# Patient Record
Sex: FEMALE | Race: WHITE | NOT HISPANIC OR LATINO | Employment: FULL TIME | URBAN - METROPOLITAN AREA
[De-identification: names, ages, dates, MRNs, and addresses within clinical notes are randomized per-mention and may not be internally consistent; named-entity substitution may affect disease eponyms.]

---

## 2017-02-11 ENCOUNTER — ALLSCRIPTS OFFICE VISIT (OUTPATIENT)
Dept: OTHER | Facility: OTHER | Age: 52
End: 2017-02-11

## 2017-02-11 DIAGNOSIS — E78.5 HYPERLIPIDEMIA: ICD-10-CM

## 2017-02-11 DIAGNOSIS — D64.9 ANEMIA: ICD-10-CM

## 2017-02-11 DIAGNOSIS — R53.83 OTHER FATIGUE: ICD-10-CM

## 2017-02-11 DIAGNOSIS — R10.9 ABDOMINAL PAIN: ICD-10-CM

## 2017-02-11 DIAGNOSIS — Z13.1 ENCOUNTER FOR SCREENING FOR DIABETES MELLITUS: ICD-10-CM

## 2017-02-11 DIAGNOSIS — E55.9 VITAMIN D DEFICIENCY: ICD-10-CM

## 2017-02-11 DIAGNOSIS — Z13.0 ENCOUNTER FOR SCREENING FOR DISEASES OF THE BLOOD AND BLOOD-FORMING ORGANS AND CERTAIN DISORDERS INVOLVING THE IMMUNE MECHANISM: ICD-10-CM

## 2017-02-11 DIAGNOSIS — Z13.29 ENCOUNTER FOR SCREENING FOR OTHER SUSPECTED ENDOCRINE DISORDER: ICD-10-CM

## 2017-02-14 ENCOUNTER — GENERIC CONVERSION - ENCOUNTER (OUTPATIENT)
Dept: OTHER | Facility: OTHER | Age: 52
End: 2017-02-14

## 2017-03-17 ENCOUNTER — ALLSCRIPTS OFFICE VISIT (OUTPATIENT)
Dept: OTHER | Facility: OTHER | Age: 52
End: 2017-03-17

## 2017-10-03 DIAGNOSIS — Z12.31 ENCOUNTER FOR SCREENING MAMMOGRAM FOR MALIGNANT NEOPLASM OF BREAST: ICD-10-CM

## 2017-10-10 ENCOUNTER — ALLSCRIPTS OFFICE VISIT (OUTPATIENT)
Dept: OTHER | Facility: OTHER | Age: 52
End: 2017-10-10

## 2017-10-12 NOTE — PROGRESS NOTES
Assessment    1  Headache (784 0) (R51)   2  Cervicalgia (723 1) (M54 2)    Plan  Cervicalgia    · Diclofenac Sodium 75 MG Oral Tablet Delayed Release; take 1 tablet by mouthtwice a day   · MethylPREDNISolone 4 MG Oral Tablet Therapy Pack (Medrol); TAKE ASDIRECTED ON PACKAGE  Cervicalgia, Headache    · Cyclobenzaprine HCl - 10 MG Oral Tablet; TAKE 1 TABLET 3 TIMES DAILY ASNEEDED  Headache    · * MRI BRAIN WO CONTRAST; Status:Need Information - Financial Authorization; Requested for:10Oct2017;     Discussion/Summary    Will get MRI brain as this is new headache symptoms and family hx of vascular problems  Will also try to treat in case of musculoskeletal origin  RTO 1 month  Chief Complaint  Pt presents today for worsening headaches  Pt states they are more often  History of Present Illness  HPI: Pt presents complaining of what she calls headaches, however, upon further questioning her pain is localized at the base of her head near the occiput and neck and radiates toward her shoulders  She notes that they are worse on the weekend  She has been having them for months  She describes it as a dull ache and notes it is worse with flexion and extension of the neck  Topamax has not helped  She denies ringing in the ears, hearing loss, joitn pain, fever or chills  Her sister was recently dx with vascular blockages in her brain  Pt notes some tingling in her hands and worsening of the pain when she bends over and stands back up  Review of Systems   Constitutional: as noted in HPI   ENT: no ear ache, no loss of hearing, no nosebleeds or nasal discharge, no sore throat or hoarseness  Cardiovascular: no complaints of slow or fast heart rate, no chest pain, no palpitations, no leg claudication or lower extremity edema  Respiratory: no complaints of shortness of breath, no wheezing, no dyspnea on exertion, no orthopnea or PND  Breasts: no complaints of breast pain, breast lump or nipple discharge  Gastrointestinal: no complaints of abdominal pain, no constipation, no nausea or diarrhea, no vomiting, no bloody stools  Genitourinary: no complaints of dysuria, no incontinence, no pelvic pain, no dysmenorrhea, no vaginal discharge or abnormal vaginal bleeding  Musculoskeletal: no complaints of arthralgia, no myalgia, no joint swelling or stiffness, no limb pain or swelling  Neurological: as noted in HPI  ROS reviewed  Active Problems  1  Abdominal pain (789 00) (R10 9)   2  Acute sinusitis (461 9) (J01 90)   3  Anemia (285 9) (D64 9)   4  Depression (311) (F32 9)   5  Encounter for screening mammogram for breast cancer (V76 12) (Z12 31)   6  Encounter for vitamin deficiency screening (V77 99) (Z13 21)   7  Fatigue (780 79) (R53 83)   8  Headache (784 0) (R51)   9  History of cervical cancer (V10 41) (Z85 41)   10  Hyperlipidemia (272 4) (E78 5)   11  Migraine headache (346 90) (G43 909)   12  Physical examination of employee (V70 5) (Z02 89)   13  Physical examination of employee (V70 5) (Z02 89)   14  Screen for colon cancer (V76 51) (Z12 11)   15  Screening for diabetes mellitus (DM) (V77 1) (Z13 1)   16  Screening for iron deficiency anemia (V78 0) (Z13 0)   17  Screening for thyroid disorder (V77 0) (Z13 29)   18  Vitamin D deficiency (268 9) (E55 9)    Past Medical History  Active Problems And Past Medical History Reviewed: The active problems and past medical history were reviewed and updated today  Surgical History  Surgical History Reviewed: The surgical history was reviewed and updated today  Social History     · Being A Social Drinker   · Marital History - Currently    · Never A Smoker  The social history was reviewed and updated today  The social history was reviewed and is unchanged  Family History  Family History Reviewed: The family history was reviewed and updated today  Current Meds   1   Flonase Allergy Relief 50 MCG/ACT Nasal Suspension; use 2 sprays in each nostril once daily; Therapy: 20XFF8838 to (Last Rx:21Dox4324)  Requested for: 65PVW6456 Ordered   2  Montelukast Sodium 10 MG Oral Tablet; TAKE 1 TABLET DAILY; Therapy: 27FEV1434 to (Evaluate:70Kir7267)  Requested for: 95AMD1932; Last Rx:83Gdv2362 Ordered   3  Rosuvastatin Calcium 10 MG Oral Tablet; Take 1 tablet daily; Therapy: 43LTX7566 to (Last Rx:27Yld1048)  Requested for: 09Vyp1288 Ordered   4  SUMAtriptan Succinate 100 MG Oral Tablet; TAKE 1 TABLET AT ONSET OF MIAGRAINE HEADACHE  MAY REPEAT IN 2 HOURS; Therapy: 17POY2032 to (NBYBFFMZ:97PGS1811)  Requested for: 25TBZ1142; Last Rx:37Tme9033 Ordered   5  Topiramate 50 MG Oral Tablet; take 1 tablet twice a day; Therapy: 44GYD3967 to (Last Rx:12Qdc9627)  Requested for: 41FDF7194 Ordered   6  Venlafaxine HCl ER 75 MG Oral Capsule Extended Release 24 Hour; Take 2 capsules daily; Therapy: 89RSP4631 to (Last Rx:03Mas7000)  Requested for: 13Xig2609 Ordered    The medication list was reviewed and updated today  Allergies  1  No Known Drug Allergies    Vitals   Recorded: 58UCT5640 10:29AM   Temperature 99 5 F   Heart Rate 80   Respiration 16   Systolic 230   Diastolic 80   Height 5 ft 11 in   Weight 157 lb 2 oz   BMI Calculated 21 91   BSA Calculated 1 9       Physical Exam   Constitutional  General appearance: No acute distress, well appearing and well nourished  Ears, Nose, Mouth, and Throat  External inspection of ears and nose: Normal    Otoscopic examination: Tympanic membranes translucent with normal light reflex  Canals patent without erythema  Nasal mucosa, septum, and turbinates: Normal without edema or erythema  Oropharynx: Normal with no erythema, edema, exudate or lesions  Pulmonary  Respiratory effort: No increased work of breathing or signs of respiratory distress  Auscultation of lungs: Clear to auscultation  Cardiovascular  Auscultation of heart: Normal rate and rhythm, normal S1 and S2, without murmurs     Examination of extremities for edema and/or varicosities: Normal    Carotid pulses: Normal    Abdomen  Abdomen: Non-tender, no masses     Psychiatric  Orientation to person, place, and time: Normal    Mood and affect: Normal          Signatures   Electronically signed by : MADISON Copeland; Oct 11 2017  8:33AM EST                       (Author)    Electronically signed by : CARLY West ; Oct 11 2017  8:43AM EST                       (Author)

## 2017-10-18 ENCOUNTER — GENERIC CONVERSION - ENCOUNTER (OUTPATIENT)
Dept: OTHER | Facility: OTHER | Age: 52
End: 2017-10-18

## 2017-10-24 ENCOUNTER — GENERIC CONVERSION - ENCOUNTER (OUTPATIENT)
Dept: OTHER | Facility: OTHER | Age: 52
End: 2017-10-24

## 2017-12-07 ENCOUNTER — TELEPHONE (OUTPATIENT)
Dept: LABOR AND DELIVERY | Facility: HOSPITAL | Age: 52
End: 2017-12-07

## 2018-01-12 VITALS
WEIGHT: 157.13 LBS | SYSTOLIC BLOOD PRESSURE: 120 MMHG | DIASTOLIC BLOOD PRESSURE: 80 MMHG | HEART RATE: 80 BPM | BODY MASS INDEX: 22 KG/M2 | HEIGHT: 71 IN | RESPIRATION RATE: 16 BRPM | TEMPERATURE: 99.5 F

## 2018-01-12 NOTE — PROGRESS NOTES
Assessment   1  Abdominal pain (789 00) (R10 9)  2  Encounter for preventive health examination (V70 0) (Z00 00)  3  History of cervical cancer (V10 41) (Z85 41)    Plan   Abdominal pain    · * XR ABDOMEN OBSTRUCTION SERIES; Status:Active; Requested for:11Feb2017; Anemia, Fatigue, Hyperlipidemia, Screening for diabetes mellitus (DM), Screening for  iron deficiency anemia, Screening for thyroid disorder    · * MAMMO SCREENING BILATERAL W CAD; Status:Hold For - Scheduling; Requested  for:11Feb2017;   Hyperlipidemia    · Stop: Crestor 10 MG Oral Tablet (Rosuvastatin Calcium)    (1) CBC/PLT/DIFF; Status:Active - Retrospective By Protocol Authorization; Requested for:11Feb2017;   Due:11Feb2018; Last Updated David Tony; 2/13/2017 8:10:20 AM;Ordered; For:Anemia, Fatigue, Hyperlipidemia, Screening for diabetes mellitus (DM), Screening for iron deficiency anemia, Screening for thyroid disorder; Ordered By:Thaddeus Schaffer;   (1) COMPREHENSIVE METABOLIC PANEL; Status:Active - Retrospective By Protocol Authorization; Requested for:11Feb2017;   Due:11Feb2018; Last Updated David Tony; 2/13/2017 8:08:21 AM;Ordered; For:Anemia, Fatigue, Hyperlipidemia, Screening for diabetes mellitus (DM), Screening for iron deficiency anemia, Screening for thyroid disorder; Ordered By:Thaddeus Schaffer;   (1) LIPID PANEL, FASTING; Status:Active - Retrospective By Protocol Authorization; Requested for:11Feb2017;   Due:11Feb2018; Last Updated David Tony; 2/13/2017 8:08:22 AM;Ordered; For:Anemia, Fatigue, Hyperlipidemia, Screening for diabetes mellitus (DM), Screening for iron deficiency anemia, Screening for thyroid disorder; Ordered By:Thaddeus Schaffer;   (1) TSH; Status:Active - Retrospective By Protocol Authorization; Requested for:11Feb2017;   Due:11Feb2018; Last Updated David Tony; 2/13/2017 8:08:22 AM;Ordered;     For:Anemia, Fatigue, Hyperlipidemia, Screening for diabetes mellitus (DM), Screening for iron deficiency anemia, Screening for thyroid disorder; Ordered By:Thaddeus Schaffer;   (1) VITAMIN D 25-HYDROXY; Status:Active - Retrospective By Protocol Authorization; Requested for:54Lvt7952;   Due:06Xze3471; Last Updated June Doherty; 2/13/2017 8:08:31 AM;Ordered; For:Anemia, Fatigue, Hyperlipidemia, Screening for diabetes mellitus (DM), Screening for iron deficiency anemia, Screening for thyroid disorder; Ordered By:Trina Schaffer; Discussion/Summary  healthy adult female Currently, she eats a healthy diet and has an adequate exercise regimen  cervical cancer screening is current Breast cancer screening: mammogram is current  Colorectal cancer screening: colorectal cancer screening is current  Check obstruction series xray  EKG shows NSR, no electrical alternans and physical exam is normal  Form completed for insurance  RTO prn  Chief Complaint  Pt here for physical for insurance, due for labs, will obtain mammo from breast health services, no complaints  History of Present Illness  HM, Adult Female: The patient is being seen for a health maintenance evaluation  The last health maintenance visit was 1 year(s) ago  Social History: Household members include spouse  She is   Work status: working full time  The patient has never smoked cigarettes  She reports never drinking alcohol  She has never used illicit drugs  General Health: The patient's health since the last visit is described as good  She has regular dental visits  She denies vision problems  She denies hearing loss  Immunizations status: up to date  Lifestyle:  She consumes a diverse and healthy diet  She does not have any weight concerns  She exercises regularly  She does not use tobacco  She denies alcohol use  She denies drug use  Reproductive health: the patient is premenopausal    Screening: cancer screening reviewed and updated  metabolic screening reviewed and updated     risk screening reviewed and updated  HPI: Pt needs form completed for life insurance documenting her hx of cervical cancer in 2009  She has not had any recurrence  She had radical hysterectomy  Pt also had CT chest abdomen and pelvis in the summer and showed small pericardial effusion  Pt is asymptomatic  Will get EKG  She also had small bowel obstruction over the summer and continues with some intermittent abdominal pain which concerns her  Review of Systems    Constitutional: as noted in HPI  Eyes: No complaints of eye pain, no red eyes, no eyesight problems, no discharge, no dry eyes, no itching of eyes  ENT: no complaints of earache, no loss of hearing, no nose bleeds, no nasal discharge, no sore throat, no hoarseness  ROS reviewed  Active Problems   1  Acute sinusitis (461 9) (J01 90)  2  Anemia (285 9) (D64 9)  3  Depression (311) (F32 9)  4  Encounter for vitamin deficiency screening (V77 99) (Z13 21)  5  Fatigue (780 79) (R53 83)  6  Headache (784 0) (R51)  7  Hyperlipidemia (272 4) (E78 5)  8  Migraine headache (346 90) (G43 909)  9  Physical examination of employee (V70 5) (Z02 89)  10  Screen for colon cancer (V76 51) (Z12 11)  11  Screening for diabetes mellitus (DM) (V77 1) (Z13 1)  12  Screening for iron deficiency anemia (V78 0) (Z13 0)  13  Screening for thyroid disorder (V77 0) (Z13 29)    Past Medical History    · History of malignant neoplasm of cervix (V10 41) (Z85 41)   · History of Physical examination of employee (V70 5) (Z02 89)    Surgical History    · History of Hysterectomy    Social History    · Being A Social Drinker   · Marital History - Currently    · Never A Smoker    Current Meds  1  Crestor 10 MG Oral Tablet; Take 1 tablet daily; Therapy: 86CJS9541 to (Last 252 2288)  Requested for: 05KWO3558 Ordered  2  Flonase Allergy Relief 50 MCG/ACT Nasal Suspension; use 2 sprays in each nostril once   daily; Therapy: 91PBO9088 to (Last Rx:81Zld0445)  Requested for: 16EWL2977 Ordered  3  LORazepam 1 MG Oral Tablet; TAKE 1 TABLET 3 TIMES DAILY AS NEEDED; Therapy: 95Rap7400 to  Requested for: 28UFP1582 Recorded  4  Rosuvastatin Calcium 10 MG Oral Tablet; Take 1 tablet daily; Therapy: 20JCL6071 to (Last KZ:15XXE0101)  Requested for: 52LUR0319 Ordered  5  Topiramate 50 MG Oral Tablet; take 1 tablet twice a day; Therapy: 87WWD0126 to (Last QZ:74UXW3618)  Requested for: 21Jan2017 Ordered  6  Treximet  MG Oral Tablet; TAKE 1 TABLET AT ONSET OF HEADACHE  MAY   REPEAT ONCE IN 2 HOURS AS NEEDED  MAXIMUM  2 TABLETS IN 24 HOURS; Therapy: 40YXN1979 to (Kenisha Montoya)  Requested for: 06JOM8633; Last   Rx:03Nov2014 Ordered  7  Venlafaxine HCl ER 75 MG Oral Capsule Extended Release 24 Hour; Take 2 capsules   daily; Therapy: 91XSE5963 to (Last Rx:11Atc2475)  Requested for: 25Oct2016 Ordered    Allergies   1  No Known Drug Allergies    Vitals   Recorded: 57DNB8180 09:49AM   Temperature 98 3 F   Heart Rate 86   Respiration 16   Systolic 429   Diastolic 76   Height 5 ft 11 in   Weight 147 lb    BMI Calculated 20 5   BSA Calculated 1 85     Physical Exam    Constitutional   General appearance: No acute distress, well appearing and well nourished  Head and Face   Head and face: Normal     Palpation of the face and sinuses: No sinus tenderness  Eyes   Conjunctiva and lids: No swelling, erythema or discharge  Pupils and irises: Equal, round, reactive to light  Ophthalmoscopic examination: Normal fundi and optic discs  Ears, Nose, Mouth, and Throat   External inspection of ears and nose: Normal     Otoscopic examination: Tympanic membranes translucent with normal light reflex  Canals patent without erythema  Hearing: Normal     Nasal mucosa, septum, and turbinates: Normal without edema or erythema  Lips, teeth, and gums: Normal, good dentition  Oropharynx: Normal with no erythema, edema, exudate or lesions  Neck   Neck: Supple, symmetric, trachea midline, no masses  Thyroid: Normal, no thyromegaly  Pulmonary   Respiratory effort: No increased work of breathing or signs of respiratory distress  Auscultation of lungs: Clear to auscultation  Cardiovascular   Auscultation of heart: Normal rate and rhythm, normal S1 and S2, no murmurs  Carotid pulses: 2+ bilaterally  Examination of extremities for edema and/or varicosities: Normal     Musculoskeletal   Gait and station: Normal     Digits and nails: Normal without clubbing or cyanosis  Joints, bones, and muscles: Normal     Range of motion: Normal     Stability: Normal     Muscle strength/tone: Normal     Psychiatric   Judgment and insight: Normal     Orientation to person, place, and time: Normal     Recent and remote memory: Intact      Mood and affect: Normal        Future Appointments    Date/Time Provider Specialty Site   03/16/2017 01:45 PM Roshan Shipman [facility]     Signatures   Electronically signed by : Ric Foster [facility]; Feb 13 2017  8:22AM EST                       (Author)    Electronically signed by : CARLY Puentes ; Feb 13 2017  9:06AM EST                       (Author)

## 2018-01-13 VITALS
HEIGHT: 71 IN | BODY MASS INDEX: 20.58 KG/M2 | DIASTOLIC BLOOD PRESSURE: 76 MMHG | HEART RATE: 86 BPM | WEIGHT: 147 LBS | RESPIRATION RATE: 16 BRPM | SYSTOLIC BLOOD PRESSURE: 116 MMHG | TEMPERATURE: 98.3 F

## 2018-01-13 NOTE — PROGRESS NOTES
Assessment    1  Encounter for preventive health examination (V70 0) (Z00 00)    Plan  Physical examination of employee    · [employer] [facility]- [address]; Status:Complete - Retrospective By Protocol Authorization;    Done: 85PBI0903 12:00AM  Unlinked    · LORazepam 1 MG Oral Tablet    Discussion/Summary  health maintenance visit     Cleared to drive for the [facility]  Chief Complaint  Pt here for [facility] Physical Exam  OS 20/20, OD 20/20, corrected  Pt able to distinguish colors on Snellen eye chart  History of Present Illness  HM, Adult Female: The patient is being seen for a health maintenance evaluation  The last health maintenance visit was 12 month(s) ago  General Health: The patient's health since the last visit is described as good  Screening:   HPI: The patient is here for [facility] physical      Review of Systems    Constitutional: No fever, no chills, feels well, no tiredness, no recent weight gain or weight loss  Eyes: No complaints of eye pain, no red eyes, no eyesight problems, no discharge, no dry eyes, no itching of eyes  ENT: no complaints of earache, no loss of hearing, no nose bleeds, no nasal discharge, no sore throat, no hoarseness  Cardiovascular: No complaints of slow heart rate, no fast heart rate, no chest pain, no palpitations, no leg claudication, no lower extremity edema  Respiratory: No complaints of shortness of breath, no wheezing, no cough, no SOB on exertion, no orthopnea, no PND  Gastrointestinal: No complaints of abdominal pain, no constipation, no nausea or vomiting, no diarrhea, no bloody stools  Genitourinary: No complaints of dysuria, no incontinence, no pelvic pain, no dysmenorrhea, no vaginal discharge or bleeding  Musculoskeletal: No complaints of arthralgias, no myalgias, no joint swelling or stiffness, no limb pain or swelling  Integumentary: No complaints of skin rash or lesions, no itching, no skin wounds, no breast pain or lump     Neurological: No complaints of headache, no confusion, no convulsions, no numbness, no dizziness or fainting, no tingling, no limb weakness, no difficulty walking  Psychiatric: Not suicidal, no sleep disturbance, no anxiety or depression, no change in personality, no emotional problems  Endocrine: No complaints of proptosis, no hot flashes, no muscle weakness, no deepening of the voice, no feelings of weakness  Hematologic/Lymphatic: No complaints of swollen glands, no swollen glands in the neck, does not bleed easily, does not bruise easily  Active Problems    1  Abdominal pain (789 00) (R10 9)   2  Acute sinusitis (461 9) (J01 90)   3  Anemia (285 9) (D64 9)   4  Depression (311) (F32 9)   5  Encounter for vitamin deficiency screening (V77 99) (Z13 21)   6  Fatigue (780 79) (R53 83)   7  Headache (784 0) (R51)   8  History of cervical cancer (V10 41) (Z85 41)   9  Hyperlipidemia (272 4) (E78 5)   10  Migraine headache (346 90) (G43 909)   11  Physical examination of employee (V70 5) (Z02 89)   12  Screen for colon cancer (V76 51) (Z12 11)   13  Screening for diabetes mellitus (DM) (V77 1) (Z13 1)   14  Screening for iron deficiency anemia (V78 0) (Z13 0)   15  Screening for thyroid disorder (V77 0) (Z13 29)   16  Vitamin D deficiency (268 9) (E55 9)    Past Medical History    · History of intestinal obstruction (V12 79) (Z87 19)   · History of malignant neoplasm of cervix (V10 41) (Z85 41)    Surgical History    · History of Hysterectomy   · History of Small Bowel Resection    Social History    · Being A Social Drinker   · Marital History - Currently    · Never A Smoker    Current Meds   1  Flonase Allergy Relief 50 MCG/ACT Nasal Suspension; use 2 sprays in each nostril once   daily; Therapy: 69RXT7310 to (Last Rx:84Qsp4594)  Requested for: 34ZRE7860 Ordered   2  LORazepam 1 MG Oral Tablet; TAKE 1 TABLET 3 TIMES DAILY AS NEEDED; Therapy: 95Ujh2926 to  Requested for: 60RZR7267 Recorded   3   Montelukast Sodium 10 MG Oral Tablet; TAKE 1 TABLET DAILY; Therapy: 37NUH6051 to (Evaluate:22Yev2288)  Requested for: 23REX1805; Last   Rx:26Oot5059 Ordered   4  Rosuvastatin Calcium 10 MG Oral Tablet; Take 1 tablet daily; Therapy: 48BMT7329 to (Last TN:11TQG4674)  Requested for: 18DDZ3112 Ordered   5  Topiramate 50 MG Oral Tablet; take 1 tablet twice a day; Therapy: 31QFH7168 to (Last I45UKJ9348)  Requested for: 2017 Ordered   6  Treximet  MG Oral Tablet; TAKE 1 TABLET AT ONSET OF HEADACHE  MAY   REPEAT ONCE IN 2 HOURS AS NEEDED  MAXIMUM  2 TABLETS IN 24 HOURS; Therapy: 19OHN9483 to (Shayne Sunshine)  Requested for: 77SCR9866; Last   Rx:36Iep4881 Ordered   7  Venlafaxine HCl ER 75 MG Oral Capsule Extended Release 24 Hour; Take 2 capsules   daily; Therapy: 06BOI3573 to (Last Rx:69Dei8642)  Requested for: 2016 Ordered    Allergies    1  No Known Drug Allergies    Vitals   Recorded: 11LFF6861 10:51AM   Temperature 98 7 F, Tympanic   Heart Rate 76   Respiration 16   Systolic 92, LUE, Sitting   Diastolic 62, LUE, Sitting   Height 5 ft 11 in   Weight 157 lb 2 08 oz   BMI Calculated 21 92   BSA Calculated 1 9     Physical Exam    Constitutional   General appearance: No acute distress, well appearing and well nourished  Eyes   Conjunctiva and lids: No swelling, erythema or discharge  Ears, Nose, Mouth, and Throat   Otoscopic examination: Tympanic membranes translucent with normal light reflex  Canals patent without erythema  Hearing: Normal     Nasal mucosa, septum, and turbinates: Normal without edema or erythema  Lips, teeth, and gums: Normal, good dentition  Oropharynx: Normal with no erythema, edema, exudate or lesions  Neck   Neck: Supple, symmetric, trachea midline, no masses  Pulmonary   Respiratory effort: No increased work of breathing or signs of respiratory distress  Auscultation of lungs: Clear to auscultation      Cardiovascular   Auscultation of heart: Normal rate and rhythm, normal S1 and S2, no murmurs  Carotid pulses: 2+ bilaterally  Examination of extremities for edema and/or varicosities: Normal     Chest   Chest: Normal     Abdomen   Abdomen: Non-tender, no masses  Liver and spleen: No hepatomegaly or splenomegaly  Examination for hernias: No hernia appreciated  Lymphatic   Palpation of lymph nodes in neck: No lymphadenopathy  Palpation of lymph nodes in axillae: No lymphadenopathy  Palpation of lymph nodes in groin: No lymphadenopathy  Palpation of lymph nodes in other areas: No lymphadenopathy  Musculoskeletal   Gait and station: Normal     Digits and nails: Normal without clubbing or cyanosis  Joints, bones, and muscles: Normal     Range of motion: Normal     Stability: Normal     Muscle strength/tone: Normal     Skin   Skin and subcutaneous tissue: Normal without rashes or lesions  Palpation of skin and subcutaneous tissue: Normal turgor  Neurologic   Cranial nerves: Cranial nerves II-XII intact  Reflexes: 2+ and symmetric  Sensation: No sensory loss      Psychiatric   Judgment and insight: Normal     Mood and affect: Normal        Results/Data  [facility]- POC 06SRG0669 12:00AM Beatriz Counts     Test Name Result Flag Reference   Right Eye 20/20     Left Eye 20/20         Signatures   Electronically signed by : Cam Newell DO; Mar 17 2017 11:05AM EST                       (Author)

## 2018-01-15 NOTE — PROGRESS NOTES
Assessment    1  Physical examination of employee (V70 5) (Z02 89)    Plan  Acute sinusitis    · Amoxicillin-Pot Clavulanate 875-125 MG Oral Tablet   · Promethazine-Codeine 6 25-10 MG/5ML Oral Syrup  Anemia, Encounter for vitamin deficiency screening, Fatigue, Hyperlipidemia, Screening  for diabetes mellitus (DM), Screening for iron deficiency anemia, Screening  for thyroid disorder    · (1) CBC/PLT/DIFF; Status:Resulted - Requires Verification;   Done: 18LZC6294 10:33AM   · (1) COMPREHENSIVE METABOLIC PANEL; Status:Resulted - Requires Verification;    Done: 39EIX2393 10:33AM   · (1) LIPID PANEL, FASTING; Status:Resulted - Requires Verification;   Done: 02CFM7449  10:33AM   · (1) TSH; Status:Resulted - Requires Verification;   Done: 98ZQQ9341 10:33AM   · (1) VITAMIN D 25-HYDROXY; Status:Resulted - Requires Verification;   Done:  11XGA6846 10:33AM  Screen for colon cancer    · COLONOSCOPY; Status:Active; Requested for:43Kds4413; Unlinked    · Delsym 30 MG/5ML Oral Liquid Extended Release    Discussion/Summary  health maintenance visit Currently, she eats a healthy diet and has an adequate exercise regimen  cervical cancer screening is current Breast cancer screening: mammogram is current  Colorectal cancer screening: colonoscopy has been ordered  CLIU danish completed  Pt cleared to drive  RTO as routinely scheduled  All health maintenance up to date  The patient was counseled regarding instructions for management, risk factor reductions, patient and family education, risks and benefits of treatment options, importance of compliance with treatment  Chief Complaint  Pt here for cliu physical  vision 20 20      History of Present Illness  , Adult Female: The patient is being seen for a health maintenance evaluation  The last health maintenance visit was 1 year(s) ago  Social History: Household members include spouse  She is   Work status: working full time   The patient has never smoked cigarettes  General Health: The patient's health since the last visit is described as good  She has regular dental visits  She denies vision problems  She denies hearing loss  Immunizations status: up to date  Lifestyle:  She consumes a diverse and healthy diet  She does not have any weight concerns  She exercises regularly  She does not use tobacco  She denies alcohol use  She denies drug use  Screening: cancer screening reviewed and updated  metabolic screening reviewed and updated  risk screening reviewed and updated  Review of Systems    Constitutional: as noted in HPI  Eyes: No complaints of eye pain, no red eyes, no eyesight problems, no discharge, no dry eyes, no itching of eyes  ENT: no complaints of earache, no loss of hearing, no nose bleeds, no nasal discharge, no sore throat, no hoarseness  Cardiovascular: No complaints of slow heart rate, no fast heart rate, no chest pain, no palpitations, no leg claudication, no lower extremity edema  Respiratory: No complaints of shortness of breath, no wheezing, no cough, no SOB on exertion, no orthopnea, no PND  Gastrointestinal: No complaints of abdominal pain, no constipation, no nausea or vomiting, no diarrhea, no bloody stools  Genitourinary: No complaints of dysuria, no incontinence, no pelvic pain, no dysmenorrhea, no vaginal discharge or bleeding  Musculoskeletal: No complaints of arthralgias, no myalgias, no joint swelling or stiffness, no limb pain or swelling  Integumentary: No complaints of skin rash or lesions, no itching, no skin wounds, no breast pain or lump  Neurological: No complaints of headache, no confusion, no convulsions, no numbness, no dizziness or fainting, no tingling, no limb weakness, no difficulty walking  Psychiatric: Not suicidal, no sleep disturbance, no anxiety or depression, no change in personality, no emotional problems     Endocrine: No complaints of proptosis, no hot flashes, no muscle weakness, no deepening of the voice, no feelings of weakness  Hematologic/Lymphatic: No complaints of swollen glands, no swollen glands in the neck, does not bleed easily, does not bruise easily  ROS reviewed  Active Problems    1  Acute sinusitis (461 9) (J01 90)   2  Anemia (285 9) (D64 9)   3  Depression (311) (F32 9)   4  Fatigue (780 79) (R53 83)   5  Headache (784 0) (R51)   6  Hyperlipidemia (272 4) (E78 5)   7  Migraine headache (346 90) (G43 909)    Past Medical History    · History of Physical examination of employee (V70 5) (Z02 89)    Social History    · Being A Social Drinker   · Marital History - Currently    · Never A Smoker    Current Meds   1  Amoxicillin-Pot Clavulanate 875-125 MG Oral Tablet; TAKE 1 TABLET EVERY 12   HOURS DAILY; Therapy: 72SQU7810 to (Evaluate:23Feb2016)  Requested for: 22XOD7509; Last   Rx:13Feb2016 Ordered   2  Crestor 10 MG Oral Tablet; Take 1 tablet daily; Therapy: 22CMG9306 to (Last Rx:09Nov2015)  Requested for: 16XDM4588 Ordered   3  Delsym 30 MG/5ML Oral Liquid Extended Release; Therapy: (Recorded:11Taz5262) to Recorded   4  Flonase Allergy Relief 50 MCG/ACT Nasal Suspension; use 2 sprays in each nostril once   daily; Therapy: 78SQU5180 to (Last Rx:13Feb2016)  Requested for: 17WWF3598 Ordered   5  LORazepam 1 MG Oral Tablet; Therapy: 32AJZ6939 to (Last Rx:02Feb2012)  Requested for: 37Uny0385 Ordered   6  Promethazine-Codeine 6 25-10 MG/5ML Oral Syrup; TAKE 5 ML EVERY 4 TO 6 HOURS   AS NEEDED FOR COUGH; Therapy: 64LUS1921 to (Evaluate:16Mar2016); Last Rx:31Lec6573 Ordered   7  Topiramate 50 MG Oral Tablet; take 1 tablet twice a day; Therapy: 84CWJ9796 to (Last Rx:09Nov2015)  Requested for: 19HUT7496 Ordered   8  Treximet  MG Oral Tablet; TAKE 1 TABLET AT ONSET OF HEADACHE  MAY   REPEAT ONCE IN 2 HOURS AS NEEDED  MAXIMUM  2 TABLETS IN 24 HOURS; Therapy: 71CFU0196 to (Hanks Patches)  Requested for: 90HXQ9389; Last   Rx:03Nov2014 Ordered   9  Venlafaxine HCl ER 75 MG Oral Capsule Extended Release 24 Hour; Take 2 capsules   daily; Therapy: 44MOL6891 to (Last Rx:12Jan2016)  Requested for: 12Jan2016 Ordered    Allergies    1  No Known Drug Allergies    Vitals   Recorded: 73Uvo9255 09:59AM   Temperature 98 6 F   Heart Rate 83   Respiration 14   Systolic 615   Diastolic 70   Height 5 ft 11 in   Weight 149 lb    BMI Calculated 20 78   BSA Calculated 1 86     Physical Exam    Constitutional   General appearance: No acute distress, well appearing and well nourished  Head and Face   Head and face: Normal     Palpation of the face and sinuses: No sinus tenderness  Eyes   Conjunctiva and lids: No swelling, erythema or discharge  Pupils and irises: Equal, round, reactive to light  Ophthalmoscopic examination: Normal fundi and optic discs  Ears, Nose, Mouth, and Throat   External inspection of ears and nose: Normal     Otoscopic examination: Tympanic membranes translucent with normal light reflex  Canals patent without erythema  Hearing: Normal     Nasal mucosa, septum, and turbinates: Normal without edema or erythema  Lips, teeth, and gums: Normal, good dentition  Oropharynx: Normal with no erythema, edema, exudate or lesions  Neck   Neck: Supple, symmetric, trachea midline, no masses  Thyroid: Normal, no thyromegaly  Pulmonary   Respiratory effort: No increased work of breathing or signs of respiratory distress  Percussion of chest: Normal     Palpation of chest: Normal     Auscultation of lungs: Clear to auscultation  Cardiovascular   Auscultation of heart: Normal rate and rhythm, normal S1 and S2, no murmurs  Carotid pulses: 2+ bilaterally  Abdominal aorta: Normal     Pedal pulses: 2+ bilaterally  Peripheral vascular exam: Normal     Examination of extremities for edema and/or varicosities: Normal     Abdomen   Abdomen: Non-tender, no masses      Musculoskeletal   Gait and station: Normal     Digits and nails: Normal without clubbing or cyanosis  Joints, bones, and muscles: Normal     Range of motion: Normal     Stability: Normal     Muscle strength/tone: Normal     Skin   Skin and subcutaneous tissue: Normal without rashes or lesions  Palpation of skin and subcutaneous tissue: Normal turgor  Psychiatric   Judgment and insight: Normal     Orientation to person, place, and time: Normal     Recent and remote memory: Intact  Mood and affect: Normal        Results/Data  (1) CBC/PLT/DIFF 70EVN7322 10:33AM Real Vigil Order Number: [id number]     Order Number: [id number]     Test Name Result Flag Reference   WBC COUNT 3 62 Thousand/uL L 4 31-10 16   RBC COUNT 4 49 Million/uL  3 81-5 12   HEMOGLOBIN 13 7 g/dL  11 5-15 4   HEMATOCRIT 40 7 %  34 8-46  1   MCV 91 fL  82-98   MCH 30 5 pg  26 8-34 3   MCHC 33 7 g/dL  31 4-37 4   RDW 12 4 %  11 6-15 1   MPV 9 9 fL  8 9-12 7   PLATELET COUNT 121 Thousands/uL  149-390   NEUTROPHILS RELATIVE PERCENT 56 %  43-75   LYMPHOCYTES RELATIVE PERCENT 35 %  14-44   MONOCYTES RELATIVE PERCENT 8 %  4-12   EOSINOPHILS RELATIVE PERCENT 1 %  0-6   BASOPHILS RELATIVE PERCENT 0 %  0-1   NEUTROPHILS ABSOLUTE COUNT 2 03 Thousands/µL  1 85-7 62   LYMPHOCYTES ABSOLUTE COUNT 1 26 Thousands/µL  0 60-4 47   MONOCYTES ABSOLUTE COUNT 0 30 Thousand/µL  0 17-1 22   EOSINOPHILS ABSOLUTE COUNT 0 03 Thousand/µL  0 00-0 61   BASOPHILS ABSOLUTE COUNT 0 00 Thousands/µL  0 00-0 10     (1) COMPREHENSIVE METABOLIC PANEL 92VKE7387 04:04LZ Real ASP64maria luisa Order Number: [id number]      National Kidney Disease Education Program recommendations are as follows:  GFR calculation is accurate only with a steady state creatinine  Chronic Kidney disease less than 60 ml/min/1 73 sq  meters  Kidney failure less than 15 ml/min/1 73 sq  meters       Test Name Result Flag Reference   GLUCOSE,RANDM 95 mg/dL     If the patient is fasting, the ADA then defines impaired fasting glucose as > 100 mg/dL and diabetes as > or equal to 123 mg/dL  SODIUM 140 mmol/L  136-145   POTASSIUM 4 9 mmol/L  3 5-5 3   CHLORIDE 103 mmol/L  100-108   CARBON DIOXIDE 25 mmol/L  21-32   ANION GAP (CALC) 12 mmol/L  4-13   BLOOD UREA NITROGEN 12 mg/dL  5-25   CREATININE 0 66 mg/dL  0 60-1 30   Standardized to IDMS reference method   CALCIUM 9 2 mg/dL  8 3-10 1   BILI, TOTAL 0 28 mg/dL  0 20-1 00   ALK PHOSPHATAS 92 U/L     ALT (SGPT) 36 U/L  12-78   AST(SGOT) 23 U/L  5-45   ALBUMIN 4 1 g/dL  3 5-5 0   TOTAL PROTEIN 7 3 g/dL  6 4-8 2   eGFR Non-African American      >60 0 ml/min/1 73sq m     (1) LIPID PANEL, FASTING 93Lrv5766 10:33AM Lynn Bella Order Number: [id number]      Triglyceride:         Normal              <150 mg/dl       Borderline High    150-199 mg/dl       High               200-499 mg/dl       Very High          >499 mg/dl  Cholesterol:         Desirable        <200 mg/dl      Borderline High  200-239 mg/dl      High             >239 mg/dl  HDL Cholesterol:        High    >59 mg/dL      Low     <41 mg/dL  LDL CALCULATED:    This screening LDL is a calculated result  It does not have the accuracy of the Direct Measured LDL in the monitoring of patients with hyperlipidemia and/or statin therapy  Direct Measure LDL (DDM775) must be ordered separately in these patients  Test Name Result Flag Reference   CHOLESTEROL 158 mg/dL     HDL,DIRECT 70 mg/dL H 40-60   LDL CHOLESTEROL CALCULATED 80 mg/dL  0-100   TRIGLYCERIDES 38 mg/dL  <=150     (1) TSH 32YWY5133 10:33AM Dejon Flood Order Number: [id number]    Patients undergoing fluorescein dye angiography may retain small amounts of fluorescein in the body for 48-72 hours post procedure  Samples containing fluorescein can produce falsely depressed TSH values  If the patient had this procedure,a specimen should be resubmitted post fluorescein clearance          The recommended reference ranges for TSH during pregnancy are as follows:  First trimester 0 1 to 2 5 uIU/mL  Second trimester  0 2 to 3 0 uIU/mL  Third trimester 0 3 to 3 0 uIU/m     Test Name Result Flag Reference   TSH 1 129 uIU/mL  0 358-3 740     (1) VITAMIN D 25-HYDROXY 65Nuk0568 10:33AM Tammie Solorio Order Number: [id number]    TW Order Number: [id number]     Test Name Result Flag Reference   VIT D 25-HYDROX 22 7 ng/mL L 30 0-100 0       Signatures   Electronically signed by : MADISON Ramirez; Feb 25 2016  4:38PM EST                       (Author)    Electronically signed by : Lillian Graham DO; Feb 26 2016  8:04AM EST                       (Author)

## 2018-01-18 NOTE — RESULT NOTES
Verified Results  (1) CBC/PLT/DIFF [id number] 10:33AM Adalberto Watkins Order Number: [id number]     Order Number: [id number]     Test Name Result Flag Reference   WBC COUNT 3 62 Thousand/uL L 4 31-10 16   RBC COUNT 4 49 Million/uL  3 81-5 12   HEMOGLOBIN 13 7 g/dL  11 5-15 4   HEMATOCRIT 40 7 %  34 8-46  1   MCV 91 fL  82-98   MCH 30 5 pg  26 8-34 3   MCHC 33 7 g/dL  31 4-37 4   RDW 12 4 %  11 6-15 1   MPV 9 9 fL  8 9-12 7   PLATELET COUNT 412 Thousands/uL  149-390   NEUTROPHILS RELATIVE PERCENT 56 %  43-75   LYMPHOCYTES RELATIVE PERCENT 35 %  14-44   MONOCYTES RELATIVE PERCENT 8 %  4-12   EOSINOPHILS RELATIVE PERCENT 1 %  0-6   BASOPHILS RELATIVE PERCENT 0 %  0-1   NEUTROPHILS ABSOLUTE COUNT 2 03 Thousands/µL  1 85-7 62   LYMPHOCYTES ABSOLUTE COUNT 1 26 Thousands/µL  0 60-4 47   MONOCYTES ABSOLUTE COUNT 0 30 Thousand/µL  0 17-1 22   EOSINOPHILS ABSOLUTE COUNT 0 03 Thousand/µL  0 00-0 61   BASOPHILS ABSOLUTE COUNT 0 00 Thousands/µL  0 00-0 10     (1) COMPREHENSIVE METABOLIC PANEL 93XQA1191 82:22SP Adalberto Watkins Order Number: [id number]      National Kidney Disease Education Program recommendations are as follows:  GFR calculation is accurate only with a steady state creatinine  Chronic Kidney disease less than 60 ml/min/1 73 sq  meters  Kidney failure less than 15 ml/min/1 73 sq  meters  Test Name Result Flag Reference   GLUCOSE,RANDM 95 mg/dL     If the patient is fasting, the ADA then defines impaired fasting glucose as > 100 mg/dL and diabetes as > or equal to 123 mg/dL     SODIUM 140 mmol/L  136-145   POTASSIUM 4 9 mmol/L  3 5-5 3   CHLORIDE 103 mmol/L  100-108   CARBON DIOXIDE 25 mmol/L  21-32   ANION GAP (CALC) 12 mmol/L  4-13   BLOOD UREA NITROGEN 12 mg/dL  5-25   CREATININE 0 66 mg/dL  0 60-1 30   Standardized to IDMS reference method   CALCIUM 9 2 mg/dL  8 3-10 1   BILI, TOTAL 0 28 mg/dL  0 20-1 00   ALK PHOSPHATAS 92 U/L     ALT (SGPT) 36 U/L  12-78   AST(SGOT) 23 U/L  5-45 ALBUMIN 4 1 g/dL  3 5-5 0   TOTAL PROTEIN 7 3 g/dL  6 4-8 2   eGFR Non-African American      >60 0 ml/min/1 73sq m     (1) LIPID PANEL, FASTING 28Wmj1278 10:33AM Luc Bland Order Number: [id number]      Triglyceride:         Normal              <150 mg/dl       Borderline High    150-199 mg/dl       High               200-499 mg/dl       Very High          >499 mg/dl  Cholesterol:         Desirable        <200 mg/dl      Borderline High  200-239 mg/dl      High             >239 mg/dl  HDL Cholesterol:        High    >59 mg/dL      Low     <41 mg/dL  LDL CALCULATED:    This screening LDL is a calculated result  It does not have the accuracy of the Direct Measured LDL in the monitoring of patients with hyperlipidemia and/or statin therapy  Direct Measure LDL (IKM068) must be ordered separately in these patients  Test Name Result Flag Reference   CHOLESTEROL 158 mg/dL     HDL,DIRECT 70 mg/dL H 40-60   LDL CHOLESTEROL CALCULATED 80 mg/dL  0-100   TRIGLYCERIDES 38 mg/dL  <=150     (1) TSH [id number] 10:33AM Tairq Flood Gabriele Order Number: [id number]    Patients undergoing fluorescein dye angiography may retain small amounts of fluorescein in the body for 48-72 hours post procedure  Samples containing fluorescein can produce falsely depressed TSH values  If the patient had this procedure,a specimen should be resubmitted post fluorescein clearance          The recommended reference ranges for TSH during pregnancy are as follows:  First trimester 0 1 to 2 5 uIU/mL  Second trimester  0 2 to 3 0 uIU/mL  Third trimester 0 3 to 3 0 uIU/m     Test Name Result Flag Reference   TSH 1 129 uIU/mL  0 358-3 740     (1) VITAMIN D 25-HYDROXY [id number] 10:33AM Luc Bland Order Number: [id number]     Order Number: [id number]     Test Name Result Flag Reference   VIT D 25-HYDROX 22 7 ng/mL L 30 0-100 0

## 2018-01-22 VITALS
HEART RATE: 76 BPM | DIASTOLIC BLOOD PRESSURE: 62 MMHG | TEMPERATURE: 98.7 F | SYSTOLIC BLOOD PRESSURE: 92 MMHG | BODY MASS INDEX: 22 KG/M2 | HEIGHT: 71 IN | RESPIRATION RATE: 16 BRPM | WEIGHT: 157.13 LBS

## 2018-02-09 ENCOUNTER — OFFICE VISIT (OUTPATIENT)
Dept: INTERNAL MEDICINE CLINIC | Facility: CLINIC | Age: 53
End: 2018-02-09
Payer: COMMERCIAL

## 2018-02-09 VITALS
SYSTOLIC BLOOD PRESSURE: 126 MMHG | OXYGEN SATURATION: 96 % | BODY MASS INDEX: 22.09 KG/M2 | HEART RATE: 78 BPM | RESPIRATION RATE: 18 BRPM | DIASTOLIC BLOOD PRESSURE: 78 MMHG | WEIGHT: 157.8 LBS | HEIGHT: 71 IN | TEMPERATURE: 98.6 F

## 2018-02-09 DIAGNOSIS — S52.501A CLOSED FRACTURE OF DISTAL END OF RIGHT RADIUS, UNSPECIFIED FRACTURE MORPHOLOGY, INITIAL ENCOUNTER: ICD-10-CM

## 2018-02-09 DIAGNOSIS — K21.9 GASTROESOPHAGEAL REFLUX DISEASE WITHOUT ESOPHAGITIS: ICD-10-CM

## 2018-02-09 DIAGNOSIS — R07.2 PRECORDIAL PAIN: Primary | ICD-10-CM

## 2018-02-09 PROBLEM — H40.20X2: Status: ACTIVE | Noted: 2018-02-09

## 2018-02-09 PROBLEM — M54.2 NECK PAIN: Status: ACTIVE | Noted: 2017-10-10

## 2018-02-09 PROBLEM — C53.9 CERVIX CANCER (HCC): Status: RESOLVED | Noted: 2017-03-01 | Resolved: 2018-02-09

## 2018-02-09 PROBLEM — E55.9 VITAMIN D DEFICIENCY: Status: ACTIVE | Noted: 2017-03-01

## 2018-02-09 PROBLEM — M54.2 NECK PAIN: Status: RESOLVED | Noted: 2017-10-10 | Resolved: 2018-02-09

## 2018-02-09 PROBLEM — C53.9 CERVIX CANCER (HCC): Status: ACTIVE | Noted: 2017-03-01

## 2018-02-09 PROBLEM — F41.0 PANIC ATTACKS: Status: ACTIVE | Noted: 2018-02-09

## 2018-02-09 PROCEDURE — 93000 ELECTROCARDIOGRAM COMPLETE: CPT | Performed by: INTERNAL MEDICINE

## 2018-02-09 PROCEDURE — 99214 OFFICE O/P EST MOD 30 MIN: CPT | Performed by: INTERNAL MEDICINE

## 2018-02-09 RX ORDER — DICLOFENAC SODIUM 75 MG/1
1 TABLET, DELAYED RELEASE ORAL 2 TIMES DAILY
COMMUNITY
Start: 2017-10-10 | End: 2018-02-09

## 2018-02-09 RX ORDER — CYCLOBENZAPRINE HCL 10 MG
1 TABLET ORAL 3 TIMES DAILY PRN
COMMUNITY
Start: 2017-10-10 | End: 2018-02-09

## 2018-02-09 RX ORDER — MELOXICAM 15 MG/1
15 TABLET ORAL DAILY
Qty: 30 TABLET | Refills: 1
Start: 2018-02-09 | End: 2018-05-26 | Stop reason: ALTCHOICE

## 2018-02-09 RX ORDER — HYDROCODONE BITARTRATE AND ACETAMINOPHEN 7.5; 325 MG/1; MG/1
TABLET ORAL
Refills: 0 | COMMUNITY
Start: 2018-01-23 | End: 2018-04-04 | Stop reason: ALTCHOICE

## 2018-02-09 RX ORDER — OMEPRAZOLE 40 MG/1
40 CAPSULE, DELAYED RELEASE ORAL DAILY
Qty: 30 CAPSULE | Refills: 0 | Status: SHIPPED | OUTPATIENT
Start: 2018-02-09 | End: 2018-03-02 | Stop reason: SDUPTHER

## 2018-02-09 RX ORDER — PREDNISOLONE ACETATE 10 MG/ML
SUSPENSION/ DROPS OPHTHALMIC
Refills: 0 | COMMUNITY
Start: 2018-02-06 | End: 2018-04-04 | Stop reason: ALTCHOICE

## 2018-02-09 RX ORDER — ROSUVASTATIN CALCIUM 10 MG/1
1 TABLET, COATED ORAL DAILY
COMMUNITY
Start: 2016-11-06 | End: 2018-02-20 | Stop reason: SDUPTHER

## 2018-02-09 RX ORDER — SUMATRIPTAN 100 MG/1
TABLET, FILM COATED ORAL
COMMUNITY
Start: 2017-09-28 | End: 2018-04-04 | Stop reason: ALTCHOICE

## 2018-02-09 RX ORDER — FLUTICASONE PROPIONATE 50 MCG
2 SPRAY, SUSPENSION (ML) NASAL DAILY
COMMUNITY
Start: 2016-02-13 | End: 2018-02-09

## 2018-02-09 RX ORDER — MELOXICAM 15 MG/1
1 TABLET ORAL DAILY
Refills: 0 | COMMUNITY
Start: 2018-02-04 | End: 2018-02-09 | Stop reason: CLARIF

## 2018-02-09 RX ORDER — TOPIRAMATE 50 MG/1
1 TABLET, FILM COATED ORAL 2 TIMES DAILY
COMMUNITY
Start: 2012-05-23 | End: 2018-04-04 | Stop reason: ALTCHOICE

## 2018-02-09 RX ORDER — VENLAFAXINE HYDROCHLORIDE 75 MG/1
2 CAPSULE, EXTENDED RELEASE ORAL DAILY
COMMUNITY
Start: 2011-03-16 | End: 2018-06-27 | Stop reason: SDUPTHER

## 2018-02-09 RX ORDER — MONTELUKAST SODIUM 10 MG/1
1 TABLET ORAL DAILY
COMMUNITY
Start: 2017-02-16 | End: 2018-04-04 | Stop reason: ALTCHOICE

## 2018-02-09 RX ORDER — LORAZEPAM 0.5 MG/1
0.5 TABLET ORAL EVERY 6 HOURS
COMMUNITY
End: 2018-04-04 | Stop reason: ALTCHOICE

## 2018-02-09 NOTE — PROGRESS NOTES
Assessment/Plan:    No problem-specific Assessment & Plan notes found for this encounter  There are no diagnoses linked to this encounter  Vitals:    02/09/18 0912   BP: 126/78   Pulse: 78   Resp: 18   Temp: 98 6 °F (37 °C)   SpO2: 96%       Subjective:      Patient ID: Chaitanya Ya is a 47 y o  female  The patient was seen examined  The patient noted acute onset of precordial chest pain  She noted no radiation of the pain and stated there is no diaphoresis or nausea vomiting  Patient states that she had been taking meloxicam and could potentially had a meal with tomato sauce  In addition, she regularly eats chocolate  We discussed reflux and esophageal spasm  I noted this could be the cause her symptoms  However, she has a history of small-bowel obstruction that required surgical intervention and she is very concerned about this  We reviewed the patient's history and noted that her are well controlled with combination of Topamax and Imitrex  Patient is doing well on her rosuvastatin  Patient notes no other concerns at this time  We discussed further evaluation and noted that a trial of a PPI would be the next most reasonable step  In addition, consider an upper GI series with a small-bowel follow-through if there is continued concern regarding obstruction  At this time, I recommend conservative treatment secondary to believe that this is most likely reflux  The following portions of the patient's history were reviewed and updated as appropriate:   She  has no past medical history on file  She  does not have any pertinent problems on file  She  has a past surgical history that includes Wrist surgery (Right)  Her family history is not on file  She  reports that she has never smoked  She has never used smokeless tobacco  She reports that she drinks alcohol  She reports that she does not use drugs    Current Outpatient Prescriptions   Medication Sig Dispense Refill    montelukast (SINGULAIR) 10 mg tablet Take 1 tablet by mouth daily      rosuvastatin (CRESTOR) 10 MG tablet Take 1 tablet by mouth daily      SUMAtriptan (IMITREX) 100 mg tablet Take by mouth      venlafaxine (EFFEXOR-XR) 75 mg 24 hr capsule Take 2 capsules by mouth daily      HYDROcodone-acetaminophen (NORCO) 7 5-325 mg per tablet take 1 tablet by mouth every 4 hours if needed for pain  0    LORazepam (ATIVAN) 0 5 mg tablet Take 0 5 mg by mouth every 6 (six) hours      prednisoLONE acetate (PRED FORTE) 1 % ophthalmic suspension INSTILL 1 DROP INTO THE LASERED EYE 4 TIMES A DAY FOR 2 WEEKS  0    topiramate (TOPAMAX) 50 MG tablet Take 1 tablet by mouth 2 (two) times a day       No current facility-administered medications for this visit  No current outpatient prescriptions on file prior to visit  No current facility-administered medications on file prior to visit  She has No Known Allergies       Review of Systems   Constitutional: Negative for chills, fatigue and fever  HENT: Negative for sinus pain, sinus pressure and sore throat  Eyes: Negative  Respiratory: Negative for cough and shortness of breath  Cardiovascular: Negative for chest pain  Gastrointestinal: Positive for abdominal pain  Negative for abdominal distention, constipation, diarrhea, nausea and vomiting  Endocrine: Negative  Genitourinary: Negative  Neurological: Negative for headaches  Objective:     Physical Exam   Constitutional: She is oriented to person, place, and time  She appears well-developed and well-nourished  HENT:   Head: Normocephalic and atraumatic  Eyes: Conjunctivae and EOM are normal  Pupils are equal, round, and reactive to light  Neck: Normal range of motion  Neck supple  Cardiovascular: Normal rate and regular rhythm  Pulmonary/Chest: Effort normal and breath sounds normal    Abdominal: Soft  Bowel sounds are normal    Musculoskeletal: Normal range of motion     Neurological: She is alert and oriented to person, place, and time  Skin: Skin is warm and dry  Psychiatric: She has a normal mood and affect

## 2018-02-20 DIAGNOSIS — E78.5 HYPERLIPIDEMIA, UNSPECIFIED HYPERLIPIDEMIA TYPE: Primary | ICD-10-CM

## 2018-02-20 RX ORDER — ROSUVASTATIN CALCIUM 10 MG/1
10 TABLET, COATED ORAL DAILY
Qty: 90 TABLET | Refills: 3 | Status: SHIPPED | OUTPATIENT
Start: 2018-02-20 | End: 2019-03-09 | Stop reason: SDUPTHER

## 2018-03-02 DIAGNOSIS — R07.2 PRECORDIAL PAIN: ICD-10-CM

## 2018-03-02 RX ORDER — OMEPRAZOLE 40 MG/1
40 CAPSULE, DELAYED RELEASE ORAL DAILY
Qty: 30 CAPSULE | Refills: 5 | Status: SHIPPED | OUTPATIENT
Start: 2018-03-02 | End: 2018-03-06 | Stop reason: SDUPTHER

## 2018-03-05 ENCOUNTER — TELEPHONE (OUTPATIENT)
Dept: INTERNAL MEDICINE CLINIC | Facility: CLINIC | Age: 53
End: 2018-03-05

## 2018-03-05 NOTE — TELEPHONE ENCOUNTER
Pt called, cancelled appt with you on Wednesday, it was a f/u for acid reflux and gerd, you gave her a rx for omeprazole which is helping her, asking for a new rx to be sent to [facility]?

## 2018-03-06 DIAGNOSIS — R07.2 PRECORDIAL PAIN: ICD-10-CM

## 2018-03-06 RX ORDER — OMEPRAZOLE 40 MG/1
40 CAPSULE, DELAYED RELEASE ORAL DAILY
Qty: 30 CAPSULE | Refills: 5 | Status: SHIPPED | OUTPATIENT
Start: 2018-03-06 | End: 2018-05-21 | Stop reason: SDUPTHER

## 2018-04-02 RX ORDER — PREDNISONE 20 MG/1
TABLET ORAL
COMMUNITY
Start: 2018-02-19 | End: 2018-04-04 | Stop reason: ALTCHOICE

## 2018-04-02 RX ORDER — ZONISAMIDE 50 MG/1
50 CAPSULE ORAL DAILY
COMMUNITY
Start: 2018-02-26 | End: 2021-06-02

## 2018-04-02 RX ORDER — RIZATRIPTAN BENZOATE 10 MG/1
TABLET, ORALLY DISINTEGRATING ORAL
COMMUNITY
Start: 2018-02-19 | End: 2019-04-18

## 2018-04-02 RX ORDER — NORTRIPTYLINE HYDROCHLORIDE 10 MG/1
CAPSULE ORAL
COMMUNITY
Start: 2018-02-19 | End: 2018-04-04 | Stop reason: ALTCHOICE

## 2018-04-04 ENCOUNTER — APPOINTMENT (OUTPATIENT)
Dept: LAB | Facility: CLINIC | Age: 53
End: 2018-04-04
Payer: COMMERCIAL

## 2018-04-04 ENCOUNTER — OFFICE VISIT (OUTPATIENT)
Dept: INTERNAL MEDICINE CLINIC | Facility: CLINIC | Age: 53
End: 2018-04-04
Payer: COMMERCIAL

## 2018-04-04 VITALS
BODY MASS INDEX: 22.34 KG/M2 | HEART RATE: 72 BPM | OXYGEN SATURATION: 97 % | WEIGHT: 159.6 LBS | DIASTOLIC BLOOD PRESSURE: 74 MMHG | HEIGHT: 71 IN | TEMPERATURE: 99.1 F | RESPIRATION RATE: 16 BRPM | SYSTOLIC BLOOD PRESSURE: 128 MMHG

## 2018-04-04 DIAGNOSIS — Z13.0 SCREENING FOR DEFICIENCY ANEMIA: ICD-10-CM

## 2018-04-04 DIAGNOSIS — Z13.220 SCREENING, LIPID: ICD-10-CM

## 2018-04-04 DIAGNOSIS — E55.9 VITAMIN D DEFICIENCY: ICD-10-CM

## 2018-04-04 DIAGNOSIS — Z13.29 SCREENING FOR THYROID DISORDER: ICD-10-CM

## 2018-04-04 DIAGNOSIS — Z02.89 PHYSICAL EXAMINATION OF EMPLOYEE: Primary | ICD-10-CM

## 2018-04-04 DIAGNOSIS — Z13.1 SCREENING FOR DIABETES MELLITUS: ICD-10-CM

## 2018-04-04 LAB
25(OH)D3 SERPL-MCNC: 37.4 NG/ML (ref 30–100)
ALBUMIN SERPL BCP-MCNC: 4.2 G/DL (ref 3.5–5)
ALP SERPL-CCNC: 102 U/L (ref 46–116)
ALT SERPL W P-5'-P-CCNC: 26 U/L (ref 12–78)
ANION GAP SERPL CALCULATED.3IONS-SCNC: 5 MMOL/L (ref 4–13)
AST SERPL W P-5'-P-CCNC: 21 U/L (ref 5–45)
BASOPHILS # BLD AUTO: 0 THOUSANDS/ΜL (ref 0–0.1)
BASOPHILS NFR BLD AUTO: 0 % (ref 0–1)
BILIRUB SERPL-MCNC: 0.29 MG/DL (ref 0.2–1)
BUN SERPL-MCNC: 10 MG/DL (ref 5–25)
CALCIUM ALBUM COR SERPL-MCNC: 10 MG/DL (ref 8.3–10.1)
CALCIUM SERPL-MCNC: 10.2 MG/DL (ref 8.3–10.1)
CHLORIDE SERPL-SCNC: 106 MMOL/L (ref 100–108)
CHOLEST SERPL-MCNC: 171 MG/DL (ref 50–200)
CO2 SERPL-SCNC: 27 MMOL/L (ref 21–32)
CREAT SERPL-MCNC: 0.77 MG/DL (ref 0.6–1.3)
EOSINOPHIL # BLD AUTO: 0.14 THOUSAND/ΜL (ref 0–0.61)
EOSINOPHIL NFR BLD AUTO: 3 % (ref 0–6)
ERYTHROCYTE [DISTWIDTH] IN BLOOD BY AUTOMATED COUNT: 13.1 % (ref 11.6–15.1)
GFR SERPL CREATININE-BSD FRML MDRD: 90 ML/MIN/1.73SQ M
GLUCOSE P FAST SERPL-MCNC: 90 MG/DL (ref 65–99)
HCT VFR BLD AUTO: 38.9 % (ref 34.8–46.1)
HDLC SERPL-MCNC: 83 MG/DL (ref 40–60)
HGB BLD-MCNC: 13 G/DL (ref 11.5–15.4)
LDLC SERPL CALC-MCNC: 78 MG/DL (ref 0–100)
LYMPHOCYTES # BLD AUTO: 2.25 THOUSANDS/ΜL (ref 0.6–4.47)
LYMPHOCYTES NFR BLD AUTO: 45 % (ref 14–44)
MCH RBC QN AUTO: 29.7 PG (ref 26.8–34.3)
MCHC RBC AUTO-ENTMCNC: 33.4 G/DL (ref 31.4–37.4)
MCV RBC AUTO: 89 FL (ref 82–98)
MONOCYTES # BLD AUTO: 0.34 THOUSAND/ΜL (ref 0.17–1.22)
MONOCYTES NFR BLD AUTO: 7 % (ref 4–12)
NEUTROPHILS # BLD AUTO: 2.23 THOUSANDS/ΜL (ref 1.85–7.62)
NEUTS SEG NFR BLD AUTO: 45 % (ref 43–75)
NRBC BLD AUTO-RTO: 0 /100 WBCS
PLATELET # BLD AUTO: 243 THOUSANDS/UL (ref 149–390)
PMV BLD AUTO: 10.4 FL (ref 8.9–12.7)
POTASSIUM SERPL-SCNC: 4 MMOL/L (ref 3.5–5.3)
PROT SERPL-MCNC: 8 G/DL (ref 6.4–8.2)
RBC # BLD AUTO: 4.38 MILLION/UL (ref 3.81–5.12)
SODIUM SERPL-SCNC: 138 MMOL/L (ref 136–145)
TRIGL SERPL-MCNC: 48 MG/DL
TSH SERPL DL<=0.05 MIU/L-ACNC: 1.21 UIU/ML (ref 0.36–3.74)
WBC # BLD AUTO: 4.96 THOUSAND/UL (ref 4.31–10.16)

## 2018-04-04 PROCEDURE — 85025 COMPLETE CBC W/AUTO DIFF WBC: CPT

## 2018-04-04 PROCEDURE — 99499 UNLISTED E&M SERVICE: CPT | Performed by: PHYSICIAN ASSISTANT

## 2018-04-04 PROCEDURE — 80053 COMPREHEN METABOLIC PANEL: CPT

## 2018-04-04 PROCEDURE — 80061 LIPID PANEL: CPT

## 2018-04-04 PROCEDURE — 36415 COLL VENOUS BLD VENIPUNCTURE: CPT

## 2018-04-04 PROCEDURE — 82306 VITAMIN D 25 HYDROXY: CPT

## 2018-04-04 PROCEDURE — 84443 ASSAY THYROID STIM HORMONE: CPT

## 2018-04-04 NOTE — PROGRESS NOTES
Assessment/Plan:    No problem-specific Assessment & Plan notes found for this encounter  Diagnoses and all orders for this visit:    Screening for diabetes mellitus  -     Comprehensive metabolic panel; Future    Screening, lipid  -     Lipid panel; Future    Screening for thyroid disorder  -     TSH, 3rd generation with T4 reflex; Future    Screening for deficiency anemia  -     CBC and differential; Future    Vitamin D deficiency  -     Vitamin D 25 hydroxy; Future    Other orders  -     Discontinue: nortriptyline (PAMELOR) 10 mg capsule;   -     Discontinue: predniSONE 20 mg tablet;   -     rizatriptan (MAXALT-MLT) 10 MG disintegrating tablet;   -     zonisamide (ZONEGRAN) 50 MG capsule;   -     Cholecalciferol 1000 units CHEW; Chew          Subjective:      Patient ID: Sigifredo Yarbrough is a 47 y o  female  Pt presents for [facility] physical  She is doing well without any concerns or complaints  Vision is 20/20 Ou  Color vision intact  No hx of diabetes, rheumatologic, orthopedic or cardiovascular condition that would impair her ability to drive  Mammo up to date  She is due for labs  She lives with her spouse  She works [employer]  She denies tobacco, alcohol or drug use  The following portions of the patient's history were reviewed and updated as appropriate:   She  has a past medical history of Cervical cancer ([facility] [address] )  She   Patient Active Problem List    Diagnosis Date Noted    Angle-closure glaucoma, moderate stage 02/09/2018    Panic attacks 02/09/2018    Gastroesophageal reflux disease without esophagitis 02/09/2018    Vitamin D deficiency 03/01/2017    Depression 01/31/2013    Hyperlipidemia 09/21/2012    Migraine headache 05/23/2012     She  has a past surgical history that includes Wrist surgery (Right); Hysterectomy; and Laparoscopic small bowel resection  Her family history is not on file  She  reports that she has never smoked   She has never used smokeless tobacco  She reports that she drinks alcohol  She reports that she does not use drugs  Current Outpatient Prescriptions   Medication Sig Dispense Refill    Cholecalciferol 1000 units CHEW Chew      omeprazole (PriLOSEC) 40 MG capsule Take 1 capsule (40 mg total) by mouth daily for 180 days 30 capsule 5    rizatriptan (MAXALT-MLT) 10 MG disintegrating tablet       rosuvastatin (CRESTOR) 10 MG tablet Take 1 tablet (10 mg total) by mouth daily 90 tablet 3    venlafaxine (EFFEXOR-XR) 75 mg 24 hr capsule Take 2 capsules by mouth daily      zonisamide (ZONEGRAN) 50 MG capsule       meloxicam (MOBIC) 15 mg tablet Take 1 tablet (15 mg total) by mouth daily for 30 days 30 tablet 1     No current facility-administered medications for this visit  Current Outpatient Prescriptions on File Prior to Visit   Medication Sig    omeprazole (PriLOSEC) 40 MG capsule Take 1 capsule (40 mg total) by mouth daily for 180 days    rosuvastatin (CRESTOR) 10 MG tablet Take 1 tablet (10 mg total) by mouth daily    venlafaxine (EFFEXOR-XR) 75 mg 24 hr capsule Take 2 capsules by mouth daily    meloxicam (MOBIC) 15 mg tablet Take 1 tablet (15 mg total) by mouth daily for 30 days    [DISCONTINUED] HYDROcodone-acetaminophen (NORCO) 7 5-325 mg per tablet take 1 tablet by mouth every 4 hours if needed for pain    [DISCONTINUED] LORazepam (ATIVAN) 0 5 mg tablet Take 0 5 mg by mouth every 6 (six) hours    [DISCONTINUED] montelukast (SINGULAIR) 10 mg tablet Take 1 tablet by mouth daily    [DISCONTINUED] prednisoLONE acetate (PRED FORTE) 1 % ophthalmic suspension INSTILL 1 DROP INTO THE LASERED EYE 4 TIMES A DAY FOR 2 WEEKS    [DISCONTINUED] SUMAtriptan (IMITREX) 100 mg tablet Take by mouth    [DISCONTINUED] topiramate (TOPAMAX) 50 MG tablet Take 1 tablet by mouth 2 (two) times a day     No current facility-administered medications on file prior to visit  She has No Known Allergies       Review of Systems   Constitutional: Negative for chills and fever  HENT: Negative for congestion, ear pain, hearing loss, postnasal drip, rhinorrhea, sinus pain, sinus pressure, sore throat and trouble swallowing  Eyes: Negative for pain and visual disturbance  Respiratory: Negative for cough, chest tightness, shortness of breath and wheezing  Cardiovascular: Negative  Negative for chest pain, palpitations and leg swelling  Gastrointestinal: Negative for abdominal pain, blood in stool, constipation, diarrhea, nausea and vomiting  Endocrine: Negative for cold intolerance, heat intolerance, polydipsia, polyphagia and polyuria  Genitourinary: Negative for difficulty urinating, dysuria, flank pain and urgency  Musculoskeletal: Negative for arthralgias, back pain, gait problem and myalgias  Skin: Negative for rash  Allergic/Immunologic: Negative  Neurological: Negative for dizziness, weakness, light-headedness and headaches  Hematological: Negative  Psychiatric/Behavioral: Negative for behavioral problems, dysphoric mood and sleep disturbance  The patient is not nervous/anxious  Objective:      /74 (BP Location: Left arm, Patient Position: Sitting, Cuff Size: Standard)   Pulse 72   Temp 99 1 °F (37 3 °C) (Tympanic)   Resp 16   Ht 5' 11" (1 803 m)   Wt 72 4 kg (159 lb 9 6 oz)   SpO2 97%   BMI 22 26 kg/m²          Physical Exam   Constitutional: She is oriented to person, place, and time  She appears well-developed and well-nourished  No distress  HENT:   Head: Normocephalic and atraumatic  Right Ear: External ear normal    Left Ear: External ear normal    Nose: Nose normal    Mouth/Throat: Oropharynx is clear and moist  No oropharyngeal exudate  Eyes: Conjunctivae and EOM are normal  Pupils are equal, round, and reactive to light  Right eye exhibits no discharge  Left eye exhibits no discharge  No scleral icterus  Neck: Normal range of motion  Neck supple  No thyromegaly present     Cardiovascular: Normal rate, regular rhythm and normal heart sounds  Exam reveals no gallop and no friction rub  No murmur heard  Pulmonary/Chest: Effort normal and breath sounds normal  No respiratory distress  She has no wheezes  She has no rales  Abdominal: Soft  Bowel sounds are normal  She exhibits no distension  There is no tenderness  Musculoskeletal: Normal range of motion  She exhibits no edema, tenderness or deformity  Neurological: She is alert and oriented to person, place, and time  No cranial nerve deficit  Skin: Skin is warm and dry  She is not diaphoretic  Psychiatric: She has a normal mood and affect   Her behavior is normal  Judgment and thought content normal

## 2018-05-06 ENCOUNTER — OFFICE VISIT (OUTPATIENT)
Dept: URGENT CARE | Facility: CLINIC | Age: 53
End: 2018-05-06
Payer: COMMERCIAL

## 2018-05-06 VITALS
OXYGEN SATURATION: 99 % | TEMPERATURE: 96.6 F | DIASTOLIC BLOOD PRESSURE: 69 MMHG | HEART RATE: 74 BPM | SYSTOLIC BLOOD PRESSURE: 148 MMHG | RESPIRATION RATE: 16 BRPM

## 2018-05-06 DIAGNOSIS — G43.919 INTRACTABLE MIGRAINE WITHOUT STATUS MIGRAINOSUS, UNSPECIFIED MIGRAINE TYPE: Primary | ICD-10-CM

## 2018-05-06 PROCEDURE — 99214 OFFICE O/P EST MOD 30 MIN: CPT | Performed by: NURSE PRACTITIONER

## 2018-05-06 RX ORDER — KETOROLAC TROMETHAMINE 30 MG/ML
30 INJECTION, SOLUTION INTRAMUSCULAR; INTRAVENOUS ONCE
Status: COMPLETED | OUTPATIENT
Start: 2018-05-06 | End: 2018-05-06

## 2018-05-06 RX ADMIN — KETOROLAC TROMETHAMINE 30 MG: 30 INJECTION, SOLUTION INTRAMUSCULAR; INTRAVENOUS at 13:22

## 2018-05-06 NOTE — PATIENT INSTRUCTIONS
Acute Headache   WHAT YOU NEED TO KNOW:   An acute headache is pain or discomfort that starts suddenly and gets worse quickly  You may have an acute headache only when you feel stress or eat certain foods  Other acute headache pain can happen every day, and sometimes several times a day  DISCHARGE INSTRUCTIONS:   Return to the emergency department if:   · You have severe pain  · You have numbness or weakness on one side of your face or body  · You have a headache that occurs after a blow to the head, a fall, or other trauma  · You have a headache, are forgetful or confused, or have trouble speaking  · You have a headache, stiff neck, and a fever  Contact your healthcare provider if:   · You have a constant headache and are vomiting  · You have a headache each day that does not get better, even after treatment  · You have changes in your headaches, or new symptoms that occur when you have a headache  · You have questions or concerns about your condition or care  Medicines: You may need any of the following:  · Prescription pain medicine  may be given  The medicine your healthcare provider recommends will depend on the kind of headaches you have  You will need to take prescription headache medicines as directed to prevent a problem called rebound headache  These headaches happen with regular use of pain relievers for headache disorders  · NSAIDs , such as ibuprofen, help decrease swelling, pain, and fever  This medicine is available with or without a doctor's order  NSAIDs can cause stomach bleeding or kidney problems in certain people  If you take blood thinner medicine, always ask your healthcare provider if NSAIDs are safe for you  Always read the medicine label and follow directions  · Acetaminophen  decreases pain and fever  It is available without a doctor's order  Ask how much to take and how often to take it  Follow directions   Read the labels of all other medicines you are using to see if they also contain acetaminophen, or ask your doctor or pharmacist  Acetaminophen can cause liver damage if not taken correctly  Do not use more than 3 grams (3,000 milligrams) total of acetaminophen in one day  · Antidepressants  may be given for some kinds of headaches  · Take your medicine as directed  Contact your healthcare provider if you think your medicine is not helping or if you have side effects  Tell him or her if you are allergic to any medicine  Keep a list of the medicines, vitamins, and herbs you take  Include the amounts, and when and why you take them  Bring the list or the pill bottles to follow-up visits  Carry your medicine list with you in case of an emergency  Manage your symptoms:   · Apply heat or ice  on the headache area  Use a heat or ice pack  For an ice pack, you can also put crushed ice in a plastic bag  Cover the pack or bag with a towel before you apply it to your skin  Ice and heat both help decrease pain, and heat also helps decrease muscle spasms  Apply heat for 20 to 30 minutes every 2 hours  Apply ice for 15 to 20 minutes every hour  Apply heat or ice for as long and for as many days as directed  You may alternate heat and ice  · Relax your muscles  Lie down in a comfortable position and close your eyes  Relax your muscles slowly  Start at your toes and work your way up your body  · Keep a record of your headaches  Write down when your headaches start and stop  Include your symptoms and what you were doing when the headache began  Record what you ate or drank for 24 hours before the headache started  Describe the pain and where it hurts  Keep track of what you did to treat your headache and if it worked  Prevent an acute headache:   · Avoid anything that triggers an acute headache  Examples include exposure to chemicals, going to high altitude, or not getting enough sleep  Create a regular sleep routine   Go to sleep at the same time and wake up at the same time each day  Do not use electronic devices before bedtime  These may trigger a headache or prevent you from sleeping well  · Do not smoke  Nicotine and other chemicals in cigarettes and cigars can trigger an acute headache or make it worse  Ask your healthcare provider for information if you currently smoke and need help to quit  E-cigarettes or smokeless tobacco still contain nicotine  Talk to your healthcare provider before you use these products  · Limit alcohol as directed  Alcohol can trigger an acute headache or make it worse  If you have cluster headaches, do not drink alcohol during an episode  For other types of headaches, ask your healthcare provider if it is safe for you to drink alcohol  Ask how much is safe for you to drink, and how often  · Exercise as directed  Exercise can reduce tension and help with headache pain  Aim for 30 minutes of physical activity on most days of the week  Your healthcare provider can help you create an exercise plan  · Eat a variety of healthy foods  Healthy foods include fruits, vegetables, low-fat dairy products, lean meats, fish, whole grains, and cooked beans  Your healthcare provider or dietitian can help you create meals plans if you need to avoid foods that trigger headaches  Follow up with your healthcare provider as directed:  Bring your headache record with you when you see your healthcare provider  Write down your questions so you remember to ask them during your visits  © 2017 [facility] Information is for End User's use only and may not be sold, redistributed or otherwise used for commercial purposes  All illustrations and images included in CareNotes® are the copyrighted property of A D A M [employer] Inc  or Nir Bedoya  The above information is an  only  It is not intended as medical advice for individual conditions or treatments   Talk to your doctor, nurse or pharmacist before following any medical regimen to see if it is safe and effective for you

## 2018-05-06 NOTE — PROGRESS NOTES
[facility]        NAME: Mario Lara is a 47 y o  female  : 1965    MRN: [id number]  DATE: May 6, 2018  TIME: 12:17 PM    Assessment and Plan   Intractable migraine without status migrainosus, unspecified migraine type [G43 919]  1  Intractable migraine without status migrainosus, unspecified migraine type  ketorolac (TORADOL) injection 30 mg         Patient Instructions       Follow up with PCP in 3-5 days  Proceed to  ER if symptoms worsen  Chief Complaint     Chief Complaint   Patient presents with    Migraine     x 1 day         History of Present Illness       Migraine    This is a new problem  The current episode started today  The problem occurs constantly  The problem has been unchanged  The pain is located in the left unilateral region  The pain radiates to the face  The pain quality is similar to prior headaches  The quality of the pain is described as pulsating  The pain is at a severity of 6/10  Associated symptoms include phonophobia  Pertinent negatives include no blurred vision, dizziness, eye redness, eye watering, fever, hearing loss, loss of balance, nausea, neck pain, numbness, photophobia, swollen glands, visual change, vomiting or weakness  Treatments tried: Took 2 Maxalt without relief  Her past medical history is significant for migraine headaches (She is under the care of a neurologist for migraines  )  Review of Systems   Review of Systems   Constitutional: Negative for fever  HENT: Negative for hearing loss  Eyes: Negative for blurred vision, photophobia and redness  Respiratory: Negative  Cardiovascular: Negative  Gastrointestinal: Negative  Negative for nausea and vomiting  Endocrine: Negative  Genitourinary: Negative  Musculoskeletal: Negative  Negative for neck pain  Neurological: Positive for headaches  Negative for dizziness, weakness, numbness and loss of balance  Hematological: Negative  Psychiatric/Behavioral: Negative  Current Medications       Current Outpatient Prescriptions:     Cholecalciferol 1000 units CHEW, Chew, Disp: , Rfl:     omeprazole (PriLOSEC) 40 MG capsule, Take 1 capsule (40 mg total) by mouth daily for 180 days, Disp: 30 capsule, Rfl: 5    rizatriptan (MAXALT-MLT) 10 MG disintegrating tablet, , Disp: , Rfl:     rosuvastatin (CRESTOR) 10 MG tablet, Take 1 tablet (10 mg total) by mouth daily, Disp: 90 tablet, Rfl: 3    venlafaxine (EFFEXOR-XR) 75 mg 24 hr capsule, Take 2 capsules by mouth daily, Disp: , Rfl:     zonisamide (ZONEGRAN) 50 MG capsule, , Disp: , Rfl:     meloxicam (MOBIC) 15 mg tablet, Take 1 tablet (15 mg total) by mouth daily for 30 days, Disp: 30 tablet, Rfl: 1    Current Facility-Administered Medications:     ketorolac (TORADOL) injection 30 mg, 30 mg, Intramuscular, Once, RUPINDER Owen    Current Allergies     Allergies as of 05/06/2018    (No Known Allergies)            The following portions of the patient's history were reviewed and updated as appropriate: allergies, current medications, past family history, past medical history, past social history, past surgical history and problem list      Past Medical History:   Diagnosis Date    Cervical cancer ([address] )     Depression     GERD (gastroesophageal reflux disease)     Migraine        Past Surgical History:   Procedure Laterality Date    HYSTERECTOMY      LAPAROSCOPIC SMALL BOWEL RESECTION      WRIST SURGERY Right        No family history on file  Medications have been verified  Objective   /69   Pulse 74   Temp (!) 96 6 °F (35 9 °C)   Resp 16   SpO2 99%        Physical Exam     Physical Exam   Constitutional: She is oriented to person, place, and time  She appears well-developed and well-nourished  She appears distressed  HENT:   Head: Normocephalic and atraumatic     Right Ear: External ear normal    Left Ear: External ear normal    Nose: Nose normal    Mouth/Throat: Oropharynx is clear and moist    Eyes: Conjunctivae and EOM are normal  Pupils are equal, round, and reactive to light  Neck: Normal range of motion  Neck supple  Cardiovascular: Normal rate and regular rhythm  Pulmonary/Chest: Effort normal and breath sounds normal    Abdominal: Soft  Bowel sounds are normal    Lymphadenopathy:     She has no cervical adenopathy  Neurological: She is alert and oriented to person, place, and time  She has normal reflexes  She displays normal reflexes  No cranial nerve deficit  She exhibits normal muscle tone  Coordination normal    Skin: Skin is warm and dry  She is not diaphoretic  Psychiatric: She has a normal mood and affect  Nursing note and vitals reviewed  I advised her to follow up with neurologist if headache persists  She verbalozed understanding and was agreeable to this plan

## 2018-05-21 ENCOUNTER — TELEPHONE (OUTPATIENT)
Dept: INTERNAL MEDICINE CLINIC | Facility: CLINIC | Age: 53
End: 2018-05-21

## 2018-05-21 DIAGNOSIS — R07.2 PRECORDIAL PAIN: ICD-10-CM

## 2018-05-21 RX ORDER — OMEPRAZOLE 40 MG/1
40 CAPSULE, DELAYED RELEASE ORAL DAILY
Qty: 90 CAPSULE | Refills: 3 | Status: SHIPPED | OUTPATIENT
Start: 2018-05-21 | End: 2021-06-02

## 2018-05-26 ENCOUNTER — OFFICE VISIT (OUTPATIENT)
Dept: URGENT CARE | Facility: CLINIC | Age: 53
End: 2018-05-26
Payer: COMMERCIAL

## 2018-05-26 VITALS
TEMPERATURE: 97.4 F | HEART RATE: 77 BPM | RESPIRATION RATE: 16 BRPM | DIASTOLIC BLOOD PRESSURE: 74 MMHG | OXYGEN SATURATION: 97 % | SYSTOLIC BLOOD PRESSURE: 140 MMHG

## 2018-05-26 DIAGNOSIS — H10.9 BACTERIAL CONJUNCTIVITIS: Primary | ICD-10-CM

## 2018-05-26 DIAGNOSIS — L03.213 PERIORBITAL CELLULITIS OF RIGHT EYE: ICD-10-CM

## 2018-05-26 PROCEDURE — 99213 OFFICE O/P EST LOW 20 MIN: CPT | Performed by: PHYSICIAN ASSISTANT

## 2018-05-26 RX ORDER — TOBRAMYCIN 3 MG/ML
1 SOLUTION/ DROPS OPHTHALMIC
Qty: 2.5 ML | Refills: 0 | Status: SHIPPED | OUTPATIENT
Start: 2018-05-26 | End: 2018-06-05

## 2018-05-26 RX ORDER — AMOXICILLIN AND CLAVULANATE POTASSIUM 875; 125 MG/1; MG/1
1 TABLET, FILM COATED ORAL EVERY 12 HOURS SCHEDULED
Qty: 14 TABLET | Refills: 0 | Status: SHIPPED | OUTPATIENT
Start: 2018-05-26 | End: 2018-06-02

## 2018-05-26 NOTE — PROGRESS NOTES
[facility]        NAME: Charlie Wray is a 47 y o  female  : 1965    MRN: [id number]  DATE: May 26, 2018  TIME: 9:24 AM    Assessment and Plan   Bacterial conjunctivitis [H10 9]  1  Bacterial conjunctivitis  amoxicillin-clavulanate (AUGMENTIN) 875-125 mg per tablet    tobramycin (TOBREX) 0 3 % SOLN   2  Periorbital cellulitis of right eye  amoxicillin-clavulanate (AUGMENTIN) 875-125 mg per tablet    tobramycin (TOBREX) 0 3 % SOLN     Patient Instructions     Take antibiotics as prescribed  Follow up with PCP or opthalmologist in 3-5 days  Cool compresses to eye as needed for comfort  Proceed to  ER if symptoms worsen  Chief Complaint     Chief Complaint   Patient presents with    Eye Swelling     right, today     History of Present Illness       Conjunctivitis    The current episode started today  The onset was sudden  The problem occurs continuously  The problem has been rapidly worsening  The problem is moderate  Nothing relieves the symptoms  Associated symptoms include eye itching, eye discharge, eye pain and eye redness  Pertinent negatives include no fever, no decreased vision, no double vision, no photophobia, no congestion, no ear discharge, no ear pain, no headaches, no hearing loss, no mouth sores, no rhinorrhea, no sore throat, no stridor, no swollen glands, no cough and no wheezing  The eye pain is mild  Review of Systems   Review of Systems   Constitutional: Negative for activity change, appetite change, chills, diaphoresis, fatigue, fever and unexpected weight change  HENT: Negative for congestion, ear discharge, ear pain, hearing loss, mouth sores, rhinorrhea and sore throat  Eyes: Positive for pain, discharge, redness and itching  Negative for double vision, photophobia and visual disturbance  Respiratory: Negative for apnea, cough, choking, chest tightness, shortness of breath, wheezing and stridor  Neurological: Negative for headaches           Current Medications       Current Outpatient Prescriptions:     Cholecalciferol 1000 units CHEW, Chew, Disp: , Rfl:     omeprazole (PriLOSEC) 40 MG capsule, Take 1 capsule (40 mg total) by mouth daily for 180 days, Disp: 90 capsule, Rfl: 3    rizatriptan (MAXALT-MLT) 10 MG disintegrating tablet, , Disp: , Rfl:     rosuvastatin (CRESTOR) 10 MG tablet, Take 1 tablet (10 mg total) by mouth daily, Disp: 90 tablet, Rfl: 3    venlafaxine (EFFEXOR-XR) 75 mg 24 hr capsule, Take 2 capsules by mouth daily, Disp: , Rfl:     zonisamide (ZONEGRAN) 50 MG capsule, , Disp: , Rfl:     amoxicillin-clavulanate (AUGMENTIN) 875-125 mg per tablet, Take 1 tablet by mouth every 12 (twelve) hours for 7 days, Disp: 14 tablet, Rfl: 0    tobramycin (TOBREX) 0 3 % SOLN, Administer 1 drop to both eyes every 4 (four) hours while awake for 10 days, Disp: 2 5 mL, Rfl: 0    Current Allergies     Allergies as of 05/26/2018    (No Known Allergies)            The following portions of the patient's history were reviewed and updated as appropriate: allergies, current medications, past family history, past medical history, past social history, past surgical history and problem list      Past Medical History:   Diagnosis Date    Cervical cancer ([address] )     Depression     GERD (gastroesophageal reflux disease)     Migraine        Past Surgical History:   Procedure Laterality Date    HYSTERECTOMY      LAPAROSCOPIC SMALL BOWEL RESECTION      WRIST SURGERY Right        No family history on file  Medications have been verified  Objective   /74   Pulse 77   Temp (!) 97 4 °F (36 3 °C)   Resp 16   SpO2 97%        Physical Exam     Physical Exam   Constitutional: She appears well-developed and well-nourished  HENT:   Head: Normocephalic  Right Ear: Hearing, tympanic membrane, external ear and ear canal normal    Left Ear: Hearing, tympanic membrane, external ear and ear canal normal    Nose: No mucosal edema or rhinorrhea  Mouth/Throat: Oropharynx is clear and moist  No oropharyngeal exudate, posterior oropharyngeal edema or posterior oropharyngeal erythema  Eyes: EOM are normal  Pupils are equal, round, and reactive to light  Right eye exhibits discharge (copious purulent discharge)  Right eye exhibits no chemosis, no exudate and no hordeolum  No foreign body present in the right eye  Left eye exhibits no discharge  Right conjunctiva is injected  Right conjunctiva has no hemorrhage  Left conjunctiva is not injected  Left conjunctiva has no hemorrhage  No scleral icterus  Right eye exhibits normal extraocular motion and no nystagmus  Right pupil is round and reactive  Pupils are equal    Slit lamp exam:       The right eye shows no corneal abrasion, no corneal flare, no foreign body and no hyphema  Moderate erythema and edema surrounding right orbit   Cardiovascular: Normal rate, regular rhythm, normal heart sounds and intact distal pulses  Exam reveals no gallop and no friction rub  No murmur heard  Pulmonary/Chest: Effort normal and breath sounds normal  No respiratory distress  She has no wheezes  She has no rales

## 2018-06-27 DIAGNOSIS — F33.41 RECURRENT MAJOR DEPRESSIVE DISORDER, IN PARTIAL REMISSION (HCC): Primary | ICD-10-CM

## 2018-06-27 RX ORDER — VENLAFAXINE HYDROCHLORIDE 75 MG/1
CAPSULE, EXTENDED RELEASE ORAL
Qty: 180 CAPSULE | Refills: 5 | Status: SHIPPED | OUTPATIENT
Start: 2018-06-27 | End: 2019-03-26 | Stop reason: SDUPTHER

## 2018-11-09 ENCOUNTER — TRANSCRIBE ORDERS (OUTPATIENT)
Dept: URGENT CARE | Facility: CLINIC | Age: 53
End: 2018-11-09

## 2018-11-09 ENCOUNTER — TRANSCRIBE ORDERS (OUTPATIENT)
Dept: LAB | Facility: CLINIC | Age: 53
End: 2018-11-09

## 2018-11-09 ENCOUNTER — CLINICAL SUPPORT (OUTPATIENT)
Dept: URGENT CARE | Facility: CLINIC | Age: 53
End: 2018-11-09
Payer: COMMERCIAL

## 2018-11-09 ENCOUNTER — APPOINTMENT (OUTPATIENT)
Dept: LAB | Facility: CLINIC | Age: 53
End: 2018-11-09
Payer: COMMERCIAL

## 2018-11-09 DIAGNOSIS — E88.1 LIPODYSTROPHY: Primary | ICD-10-CM

## 2018-11-09 DIAGNOSIS — Z01.812 ENCOUNTER FOR PREPROCEDURAL LABORATORY EXAMINATION: ICD-10-CM

## 2018-11-09 DIAGNOSIS — Z01.818 ENCOUNTER FOR OTHER PREPROCEDURAL EXAMINATION: ICD-10-CM

## 2018-11-09 DIAGNOSIS — Z01.810 ENCOUNTER FOR PREPROCEDURAL CARDIOVASCULAR EXAMINATION: ICD-10-CM

## 2018-11-09 DIAGNOSIS — E88.1 LIPODYSTROPHY: ICD-10-CM

## 2018-11-09 LAB
ANION GAP SERPL CALCULATED.3IONS-SCNC: 3 MMOL/L (ref 4–13)
BUN SERPL-MCNC: 13 MG/DL (ref 5–25)
CALCIUM SERPL-MCNC: 9.1 MG/DL (ref 8.3–10.1)
CHLORIDE SERPL-SCNC: 104 MMOL/L (ref 100–108)
CO2 SERPL-SCNC: 27 MMOL/L (ref 21–32)
CREAT SERPL-MCNC: 0.78 MG/DL (ref 0.6–1.3)
ERYTHROCYTE [DISTWIDTH] IN BLOOD BY AUTOMATED COUNT: 13.6 % (ref 11.6–15.1)
GFR SERPL CREATININE-BSD FRML MDRD: 88 ML/MIN/1.73SQ M
GLUCOSE P FAST SERPL-MCNC: 80 MG/DL (ref 65–99)
HCT VFR BLD AUTO: 38.9 % (ref 34.8–46.1)
HGB BLD-MCNC: 12.3 G/DL (ref 11.5–15.4)
MCH RBC QN AUTO: 28.9 PG (ref 26.8–34.3)
MCHC RBC AUTO-ENTMCNC: 31.6 G/DL (ref 31.4–37.4)
MCV RBC AUTO: 91 FL (ref 82–98)
PLATELET # BLD AUTO: 304 THOUSANDS/UL (ref 149–390)
PMV BLD AUTO: 10 FL (ref 8.9–12.7)
POTASSIUM SERPL-SCNC: 4 MMOL/L (ref 3.5–5.3)
RBC # BLD AUTO: 4.26 MILLION/UL (ref 3.81–5.12)
SODIUM SERPL-SCNC: 134 MMOL/L (ref 136–145)
WBC # BLD AUTO: 5.56 THOUSAND/UL (ref 4.31–10.16)

## 2018-11-09 PROCEDURE — 93005 ELECTROCARDIOGRAM TRACING: CPT

## 2018-11-09 PROCEDURE — 85027 COMPLETE CBC AUTOMATED: CPT

## 2018-11-09 PROCEDURE — 80048 BASIC METABOLIC PNL TOTAL CA: CPT

## 2018-11-09 PROCEDURE — 36415 COLL VENOUS BLD VENIPUNCTURE: CPT

## 2018-11-10 ENCOUNTER — OFFICE VISIT (OUTPATIENT)
Dept: URGENT CARE | Facility: CLINIC | Age: 53
End: 2018-11-10
Payer: COMMERCIAL

## 2018-11-10 VITALS
DIASTOLIC BLOOD PRESSURE: 69 MMHG | OXYGEN SATURATION: 97 % | SYSTOLIC BLOOD PRESSURE: 133 MMHG | TEMPERATURE: 97.9 F | RESPIRATION RATE: 18 BRPM | HEART RATE: 74 BPM

## 2018-11-10 DIAGNOSIS — B34.9 VIRAL INFECTION: Primary | ICD-10-CM

## 2018-11-10 DIAGNOSIS — J02.9 SORE THROAT: ICD-10-CM

## 2018-11-10 LAB — S PYO AG THROAT QL: NEGATIVE

## 2018-11-10 PROCEDURE — 87070 CULTURE OTHR SPECIMN AEROBIC: CPT | Performed by: PHYSICIAN ASSISTANT

## 2018-11-10 PROCEDURE — 99213 OFFICE O/P EST LOW 20 MIN: CPT | Performed by: PHYSICIAN ASSISTANT

## 2018-11-11 NOTE — PROGRESS NOTES
[address] [facility]  ([facility] [phone]  (fax) [phone]        NAME: Adriel Magallanes is a 47 y o  female  : 1965    MRN: [id number]  DATE: November 10, 2018  TIME: 7:22 PM    Assessment and Plan   Viral infection [B34 9]  1  Viral infection     2  Sore throat  POCT rapid strepA    Throat culture       Patient Instructions   Rapid strep test completed and negative  Will send for culture and call patient if positive  Infection appears viral   Recommend symptomatic treatment  Can take ibuprofen or tylenol as needed for pain or fever  Over the counter cough and cold medications to help with symptoms  Use salt water gargles for sore throat and throat lozenges  Cough drops as needed  Wash hands frequently to prevent the spread of infection  If not improving over the next 7-10 days, follow up with PCP  Symptoms may persist for 10-14 days  To present to the ER if symptoms worsen  Chief Complaint     Chief Complaint   Patient presents with    Sore Throat     Pt c/o a sore throat for two weeks  History of Present Illness   Adriel Magallanes presents to the clinic c/o    Sore Throat    This is a new problem  The current episode started 1 to 4 weeks ago  The problem has been unchanged  There has been no fever  The pain is moderate  Associated symptoms include congestion and coughing  Pertinent negatives include no abdominal pain, diarrhea, drooling, ear discharge, ear pain, headaches, hoarse voice, plugged ear sensation, neck pain, shortness of breath, stridor, swollen glands, trouble swallowing or vomiting  She has tried acetaminophen for the symptoms  The treatment provided mild relief  Review of Systems   Review of Systems   Constitutional: Negative for activity change, appetite change, chills, diaphoresis, fatigue and fever  HENT: Positive for congestion   Negative for drooling, ear discharge, ear pain, facial swelling, hoarse voice, rhinorrhea, sinus pain, sinus pressure, sneezing, sore throat and trouble swallowing  Eyes: Negative for photophobia, pain, discharge, redness, itching and visual disturbance  Respiratory: Positive for cough  Negative for apnea, chest tightness, shortness of breath, wheezing and stridor  Cardiovascular: Negative for chest pain  Gastrointestinal: Negative for abdominal distention, abdominal pain, anal bleeding, blood in stool, constipation, diarrhea, nausea and vomiting  Genitourinary: Negative for dysuria, flank pain, frequency, hematuria and urgency  Musculoskeletal: Negative for arthralgias, back pain, gait problem, joint swelling, myalgias, neck pain and neck stiffness  Skin: Negative for color change, rash and wound  Allergic/Immunologic: Negative for immunocompromised state  Neurological: Negative for dizziness, facial asymmetry and headaches  Hematological: Negative for adenopathy  Psychiatric/Behavioral: Negative for confusion and decreased concentration           Current Medications     Long-Term Prescriptions   Medication Sig Dispense Refill    Cholecalciferol 1000 units CHEW Chew      omeprazole (PriLOSEC) 40 MG capsule Take 1 capsule (40 mg total) by mouth daily for 180 days 90 capsule 3    rizatriptan (MAXALT-MLT) 10 MG disintegrating tablet       rosuvastatin (CRESTOR) 10 MG tablet Take 1 tablet (10 mg total) by mouth daily 90 tablet 3    venlafaxine (EFFEXOR-XR) 75 mg 24 hr capsule TAKE 2 CAPSULES DAILY 180 capsule 5    zonisamide (ZONEGRAN) 50 MG capsule          Current Allergies     Allergies as of 11/10/2018    (No Known Allergies)            The following portions of the patient's history were reviewed and updated as appropriate: allergies, current medications, past family history, past medical history, past social history, past surgical history and problem list   Past Medical History:   Diagnosis Date    Cervical cancer ([facility] [address] )     Depression     GERD (gastroesophageal reflux disease)     Migraine      Past Surgical History:   Procedure Laterality Date    HYSTERECTOMY      LAPAROSCOPIC SMALL BOWEL RESECTION      WRIST SURGERY Right      Social History     Social History    Marital status: /Civil Union     Spouse name: N/A    Number of children: N/A    Years of education: N/A     Occupational History    Not on file  Social History Main Topics    Smoking status: Never Smoker    Smokeless tobacco: Never Used    Alcohol use Yes      Comment: socially    Drug use: No    Sexual activity: Not on file     Other Topics Concern    Not on file     Social History Narrative    No narrative on file       Objective   /69   Pulse 74   Temp 97 9 °F (36 6 °C)   Resp 18   SpO2 97%      Physical Exam     Physical Exam   Constitutional: She is oriented to person, place, and time  She appears well-developed and well-nourished  No distress  HENT:   Head: Normocephalic and atraumatic  Right Ear: Tympanic membrane and external ear normal    Left Ear: Tympanic membrane and external ear normal    Nose: Nose normal    Mouth/Throat: Posterior oropharyngeal erythema (mild) present  No oropharyngeal exudate  Eyes: Pupils are equal, round, and reactive to light  Conjunctivae and EOM are normal  Right eye exhibits no discharge  Left eye exhibits no discharge  No scleral icterus  Neck: Normal range of motion  Neck supple  No JVD present  No tracheal deviation present  No thyromegaly present  Cardiovascular: Normal rate, regular rhythm and normal heart sounds  Exam reveals no gallop and no friction rub  No murmur heard  Pulmonary/Chest: Effort normal and breath sounds normal  No stridor  No respiratory distress  She has no wheezes  She has no rales  She exhibits no tenderness  Musculoskeletal: Normal range of motion  She exhibits no tenderness or deformity  Lymphadenopathy:     She has no cervical adenopathy     Neurological: She is alert and oriented to person, place, and time  She has normal reflexes  Coordination normal    Skin: Skin is warm and dry  No rash noted  She is not diaphoretic  No erythema  No pallor  Psychiatric: She has a normal mood and affect  Her behavior is normal  Judgment and thought content normal    Nursing note and vitals reviewed        Cordell Walker PA-C

## 2018-11-13 LAB — BACTERIA THROAT CULT: NORMAL

## 2018-11-17 ENCOUNTER — OFFICE VISIT (OUTPATIENT)
Dept: URGENT CARE | Facility: CLINIC | Age: 53
End: 2018-11-17
Payer: COMMERCIAL

## 2018-11-17 VITALS
HEART RATE: 83 BPM | BODY MASS INDEX: 21.84 KG/M2 | DIASTOLIC BLOOD PRESSURE: 73 MMHG | TEMPERATURE: 98 F | OXYGEN SATURATION: 97 % | HEIGHT: 71 IN | SYSTOLIC BLOOD PRESSURE: 137 MMHG | RESPIRATION RATE: 16 BRPM | WEIGHT: 156 LBS

## 2018-11-17 DIAGNOSIS — R11.0 NAUSEA: ICD-10-CM

## 2018-11-17 DIAGNOSIS — G43.009 MIGRAINE WITHOUT AURA AND WITHOUT STATUS MIGRAINOSUS, NOT INTRACTABLE: Primary | ICD-10-CM

## 2018-11-17 PROCEDURE — 99213 OFFICE O/P EST LOW 20 MIN: CPT | Performed by: NURSE PRACTITIONER

## 2018-11-17 PROCEDURE — 96372 THER/PROPH/DIAG INJ SC/IM: CPT | Performed by: NURSE PRACTITIONER

## 2018-11-17 RX ORDER — KETOROLAC TROMETHAMINE 30 MG/ML
15 INJECTION, SOLUTION INTRAMUSCULAR; INTRAVENOUS ONCE
Status: COMPLETED | OUTPATIENT
Start: 2018-11-17 | End: 2018-11-17

## 2018-11-17 RX ORDER — ONDANSETRON 4 MG/1
4 TABLET, ORALLY DISINTEGRATING ORAL EVERY 6 HOURS PRN
Status: SHIPPED | OUTPATIENT
Start: 2018-11-17

## 2018-11-17 RX ADMIN — ONDANSETRON 4 MG: 4 TABLET, ORALLY DISINTEGRATING ORAL at 17:17

## 2018-11-17 RX ADMIN — KETOROLAC TROMETHAMINE 15 MG: 30 INJECTION, SOLUTION INTRAMUSCULAR; INTRAVENOUS at 17:17

## 2018-11-17 NOTE — PATIENT INSTRUCTIONS
Migraine Headache   AMBULATORY CARE:   A migraine headache  is a severe headache  The pain can be so severe that it interferes with your daily activities  A migraine can last a few hours up to several days  The exact cause of migraines is not known  Common triggers for a migraine include the following:   · Stress, eye strain, oversleeping, or not getting enough sleep    · Hormone changes in women from birth control pills, pregnancy, menopause, or during a monthly period    · Skipping meals, going too long without eating, or not drinking enough liquids    · Certain foods or drinks such as chocolate, hard cheese, red wine, or drinks that contain caffeine    · Foods that contain gluten, nitrates, MSG, or artificial sweeteners    · Sunlight, bright or flashing lights, loud noises, smoke, or strong smells    · Heat, humidity, or changes in the weather  Common warning signs include the following:  Warning signs usually start 15 to 60 minutes before the headache:  · Visual changes (auras), such as blurred vision, temporary blind or bright spots, lines, or hallucinations    · Unusual tiredness or frequent yawning    · Tingling in an arm or leg  Seek care immediately if:   · You have a headache that seems different or much worse than your usual migraine headache  · You have a severe headache with a fever or a stiff neck  · You have new problems with speech, vision, balance, or movement  · You feel like you are going to faint, you become confused, or you have a seizure  Contact your healthcare provider or neurologist if:   · You have a fever  · Your migraines interfere with your daily activities  · Your medicines or treatments stop working  · You have questions about your condition or care  Treatment:  Migraines cannot be cured  The goal of treatment is to reduce your symptoms  Take medicine as soon as you feel a migraine begin  · Prescription pain medicine  may be given   Do not wait until the pain is severe before you take your medicine  · Migraine medicines  are used to help prevent a migraine or stop it once it starts  · Antinausea medicine  may be given to calm your stomach and to help prevent vomiting  This medicine can also help relieve pain  Manage your symptoms:   · Rest in a dark, quiet room  This will help decrease your pain  Sleep may also help relieve the pain  · Apply ice to decrease pain  Use an ice pack, or put crushed ice in a plastic bag  Cover the ice pack with a towel and place it on your head where it hurts for 15 to 20 minutes every hour  · Apply heat to decrease pain and muscle spasms  Use a small towel dampened with warm water or a heating pad, or sit in a warm bath  Apply heat on the area for 20 to 30 minutes every 2 hours  You may alternate heat and ice  · Keep a migraine record  Write down when your migraines start and stop  Include your symptoms and what you were doing when a migraine began  Record what you ate or drank for 24 hours before the migraine started  Keep track of what you did to treat your migraine and if it worked  Follow up with your healthcare provider as directed:  Bring your migraine record with you  Write down your questions so you remember to ask them during your visits  Prevent another migraine headache:   · Do not smoke  Nicotine and other chemicals in cigarettes and cigars can trigger a migraine and also cause lung damage  Ask your healthcare provider for information if you currently smoke and need help to quit  E-cigarettes or smokeless tobacco still contain nicotine  Talk to your healthcare provider before you use these products  · Do not drink alcohol  Alcohol can trigger a migraine  It can also interfere with the medicines used to treat your migraine  · Exercise regularly  Ask about the best exercise plan for you  · Manage stress  Stress may trigger a migraine  Learn new ways to relax, such as deep breathing      · Follow a sleep schedule  Go to bed and get up at the same time each day  · Eat a variety of healthy foods  Healthy foods include fruit, vegetables, whole-grain breads, low-fat dairy products, beans, lean meats, and fish  Do not have foods or drinks that trigger your migraines  © 2017 [address] [employer] Information is for End User's use only and may not be sold, redistributed or otherwise used for commercial purposes  All illustrations and images included in CareNotes® are the copyrighted property of A D A M , Inc  or Nir Bedoya  The above information is an  only  It is not intended as medical advice for individual conditions or treatments  Talk to your doctor, nurse or pharmacist before following any medical regimen to see if it is safe and effective for you

## 2018-11-17 NOTE — PROGRESS NOTES
[address] [facility]        NAME: Lona Ortiz is a 47 y o  female  : 1965    MRN: [id number]  DATE: 2018  TIME: 5:02 PM    Assessment and Plan   Migraine without aura and without status migrainosus, not intractable [G43 009]  1  Migraine without aura and without status migrainosus, not intractable  ketorolac (TORADOL) injection 15 mg   2  Nausea  ondansetron (ZOFRAN-ODT) dispersible tablet 4 mg         Patient Instructions       Follow up with PCP in 3-5 days  Proceed to  ER if symptoms worsen  Chief Complaint     Chief Complaint   Patient presents with    Migraine     today    Vomiting         History of Present Illness       43-year-old female presents to urgent care with chief complaint migraine started today similar to prior migraines patient states they are just doing her chronic migraine medication and she is unsure if the new medication is working well for her  She states she is having light sensitivity nausea in the migraine is left-sided radiating into left neck  Migraine    This is a recurrent problem  The current episode started today  The problem occurs constantly  The problem has been unchanged  The pain is located in the left unilateral region  The pain radiates to the left neck  The pain quality is similar to prior headaches  The quality of the pain is described as boring  The pain is at a severity of 10/10  The pain is severe  Associated symptoms include nausea, neck pain, phonophobia, photophobia, scalp tenderness and vomiting  Pertinent negatives include no abdominal pain, abnormal behavior, anorexia, back pain, blurred vision, coughing, dizziness, drainage, ear pain, eye pain, eye redness, eye watering, facial sweating, fever, hearing loss, insomnia, loss of balance, muscle aches, numbness, rhinorrhea, seizures, sinus pressure, sore throat, swollen glands, tingling, tinnitus, visual change, weakness or weight loss   The symptoms are aggravated by activity, bright light and food  She has tried ergotamines, Excedrin and triptans for the symptoms  The treatment provided mild relief  Her past medical history is significant for migraine headaches  Vomiting    This is a new problem  The current episode started today  The problem occurs less than 2 times per day  The problem has been waxing and waning  The emesis has an appearance of stomach contents  There has been no fever  Associated symptoms include headaches  Pertinent negatives include no abdominal pain, arthralgias, chest pain, chills, coughing, diarrhea, dizziness, fever, myalgias, sweats, URI or weight loss  She has tried nothing for the symptoms  The treatment provided no relief  Review of Systems   Review of Systems   Constitutional: Negative  Negative for chills, fever and weight loss  HENT: Negative  Negative for ear pain, hearing loss, rhinorrhea, sinus pressure, sore throat and tinnitus  Eyes: Positive for photophobia  Negative for blurred vision, pain and redness  Respiratory: Negative  Negative for cough  Cardiovascular: Negative  Negative for chest pain  Gastrointestinal: Positive for nausea and vomiting  Negative for abdominal pain, anorexia and diarrhea  Endocrine: Negative  Genitourinary: Negative  Musculoskeletal: Positive for neck pain  Negative for arthralgias, back pain and myalgias  Skin: Negative  Allergic/Immunologic: Negative  Neurological: Positive for headaches  Negative for dizziness, tingling, seizures, weakness, numbness and loss of balance  Hematological: Negative  Psychiatric/Behavioral: Negative  The patient does not have insomnia  All other systems reviewed and are negative          Current Medications       Current Outpatient Prescriptions:     Cholecalciferol 1000 units CHEW, Chew, Disp: , Rfl:     omeprazole (PriLOSEC) 40 MG capsule, Take 1 capsule (40 mg total) by mouth daily for 180 days, Disp: 90 capsule, Rfl: 3    rizatriptan (MAXALT-MLT) 10 MG disintegrating tablet, , Disp: , Rfl:     rosuvastatin (CRESTOR) 10 MG tablet, Take 1 tablet (10 mg total) by mouth daily, Disp: 90 tablet, Rfl: 3    venlafaxine (EFFEXOR-XR) 75 mg 24 hr capsule, TAKE 2 CAPSULES DAILY, Disp: 180 capsule, Rfl: 5    zonisamide (ZONEGRAN) 50 MG capsule, , Disp: , Rfl:     Current Facility-Administered Medications:     ketorolac (TORADOL) injection 15 mg, 15 mg, Intramuscular, Once, RUPINDER [place]ard    ondansetron (ZOFRAN-ODT) dispersible tablet 4 mg, 4 mg, Oral, Q6H PRN, [place] RUPINDER [place]    Current Allergies     Allergies as of 11/17/2018    (No Known Allergies)            The following portions of the patient's history were reviewed and updated as appropriate: allergies, current medications, past family history, past medical history, past social history, past surgical history and problem list      Past Medical History:   Diagnosis Date    Cervical cancer ([facility] [address] )     Depression     GERD (gastroesophageal reflux disease)     Migraine        Past Surgical History:   Procedure Laterality Date    HYSTERECTOMY      LAPAROSCOPIC SMALL BOWEL RESECTION      WRIST SURGERY Right        History reviewed  No pertinent family history  Medications have been verified  Objective   /73   Pulse 83   Temp 98 °F (36 7 °C)   Resp 16   Ht 5' 11" (1 803 m)   Wt 70 8 kg (156 lb)   SpO2 97%   BMI 21 76 kg/m²        Physical Exam     Physical Exam   Constitutional: She is oriented to person, place, and time  Vital signs are normal  She appears well-developed  HENT:   Head: Normocephalic and atraumatic  Right Ear: External ear normal    Left Ear: External ear normal    Nose: Nose normal    Mouth/Throat: Oropharynx is clear and moist    Eyes: Pupils are equal, round, and reactive to light  Conjunctivae and EOM are normal  Lids are everted and swept, no foreign bodies found  Neck: Normal range of motion  Neck supple     Cardiovascular: Normal rate, regular rhythm, normal heart sounds and intact distal pulses  Pulmonary/Chest: Effort normal and breath sounds normal    Abdominal: Soft  Normal appearance and bowel sounds are normal  There is no hepatosplenomegaly, splenomegaly or hepatomegaly  There is no tenderness  There is no CVA tenderness  Musculoskeletal: Normal range of motion  Neurological: She is alert and oriented to person, place, and time  She has normal strength and normal reflexes  No cranial nerve deficit or sensory deficit  Skin: Skin is warm, dry and intact  Psychiatric: She has a normal mood and affect  Her speech is normal and behavior is normal  Judgment and thought content normal    Nursing note and vitals reviewed

## 2018-11-29 LAB
ATRIAL RATE: 66 BPM
P AXIS: 21 DEGREES
PR INTERVAL: 144 MS
QRS AXIS: 78 DEGREES
QRSD INTERVAL: 78 MS
QT INTERVAL: 396 MS
QTC INTERVAL: 415 MS
T WAVE AXIS: 64 DEGREES
VENTRICULAR RATE: 66 BPM

## 2018-11-29 PROCEDURE — 93010 ELECTROCARDIOGRAM REPORT: CPT | Performed by: INTERNAL MEDICINE

## 2018-12-17 ENCOUNTER — TRANSITIONAL CARE MANAGEMENT (OUTPATIENT)
Dept: INTERNAL MEDICINE CLINIC | Facility: CLINIC | Age: 53
End: 2018-12-17

## 2018-12-17 ENCOUNTER — TELEPHONE (OUTPATIENT)
Dept: INTERNAL MEDICINE CLINIC | Facility: CLINIC | Age: 53
End: 2018-12-17

## 2018-12-18 ENCOUNTER — APPOINTMENT (OUTPATIENT)
Dept: LAB | Facility: CLINIC | Age: 53
End: 2018-12-18
Payer: COMMERCIAL

## 2018-12-18 ENCOUNTER — TRANSCRIBE ORDERS (OUTPATIENT)
Dept: ADMINISTRATIVE | Facility: HOSPITAL | Age: 53
End: 2018-12-18

## 2018-12-18 DIAGNOSIS — D64.9 SYMPTOMATIC ANEMIA: ICD-10-CM

## 2018-12-18 DIAGNOSIS — D64.9 SYMPTOMATIC ANEMIA: Primary | ICD-10-CM

## 2018-12-18 LAB
ANION GAP SERPL CALCULATED.3IONS-SCNC: 6 MMOL/L (ref 4–13)
BASOPHILS # BLD AUTO: 0.03 THOUSANDS/ΜL (ref 0–0.1)
BASOPHILS NFR BLD AUTO: 1 % (ref 0–1)
BUN SERPL-MCNC: 11 MG/DL (ref 5–25)
CALCIUM SERPL-MCNC: 9 MG/DL (ref 8.3–10.1)
CHLORIDE SERPL-SCNC: 109 MMOL/L (ref 100–108)
CO2 SERPL-SCNC: 25 MMOL/L (ref 21–32)
CREAT SERPL-MCNC: 0.67 MG/DL (ref 0.6–1.3)
EOSINOPHIL # BLD AUTO: 0.16 THOUSAND/ΜL (ref 0–0.61)
EOSINOPHIL NFR BLD AUTO: 3 % (ref 0–6)
ERYTHROCYTE [DISTWIDTH] IN BLOOD BY AUTOMATED COUNT: 18.1 % (ref 11.6–15.1)
GFR SERPL CREATININE-BSD FRML MDRD: 101 ML/MIN/1.73SQ M
GLUCOSE P FAST SERPL-MCNC: 82 MG/DL (ref 65–99)
HCT VFR BLD AUTO: 31 % (ref 34.8–46.1)
HGB BLD-MCNC: 9.7 G/DL (ref 11.5–15.4)
IMM GRANULOCYTES # BLD AUTO: 0.06 THOUSAND/UL (ref 0–0.2)
IMM GRANULOCYTES NFR BLD AUTO: 1 % (ref 0–2)
LYMPHOCYTES # BLD AUTO: 1.52 THOUSANDS/ΜL (ref 0.6–4.47)
LYMPHOCYTES NFR BLD AUTO: 30 % (ref 14–44)
MCH RBC QN AUTO: 29.9 PG (ref 26.8–34.3)
MCHC RBC AUTO-ENTMCNC: 31.3 G/DL (ref 31.4–37.4)
MCV RBC AUTO: 96 FL (ref 82–98)
MONOCYTES # BLD AUTO: 0.41 THOUSAND/ΜL (ref 0.17–1.22)
MONOCYTES NFR BLD AUTO: 8 % (ref 4–12)
NEUTROPHILS # BLD AUTO: 2.86 THOUSANDS/ΜL (ref 1.85–7.62)
NEUTS SEG NFR BLD AUTO: 57 % (ref 43–75)
NRBC BLD AUTO-RTO: 0 /100 WBCS
PLATELET # BLD AUTO: 308 THOUSANDS/UL (ref 149–390)
PMV BLD AUTO: 9.8 FL (ref 8.9–12.7)
POTASSIUM SERPL-SCNC: 4.1 MMOL/L (ref 3.5–5.3)
RBC # BLD AUTO: 3.24 MILLION/UL (ref 3.81–5.12)
SODIUM SERPL-SCNC: 140 MMOL/L (ref 136–145)
WBC # BLD AUTO: 5.04 THOUSAND/UL (ref 4.31–10.16)

## 2018-12-18 PROCEDURE — 80048 BASIC METABOLIC PNL TOTAL CA: CPT

## 2018-12-18 PROCEDURE — 36415 COLL VENOUS BLD VENIPUNCTURE: CPT

## 2018-12-18 PROCEDURE — 85025 COMPLETE CBC W/AUTO DIFF WBC: CPT

## 2018-12-18 RX ORDER — HYDROCODONE BITARTRATE AND ACETAMINOPHEN 5; 325 MG/1; MG/1
TABLET ORAL
Refills: 0 | COMMUNITY
Start: 2018-12-07 | End: 2019-04-18

## 2018-12-18 RX ORDER — CEPHALEXIN 500 MG/1
500 CAPSULE ORAL 3 TIMES DAILY
Refills: 0 | COMMUNITY
Start: 2018-12-07 | End: 2019-04-18

## 2018-12-19 ENCOUNTER — OFFICE VISIT (OUTPATIENT)
Dept: FAMILY MEDICINE CLINIC | Facility: CLINIC | Age: 53
End: 2018-12-19
Payer: COMMERCIAL

## 2018-12-19 VITALS
OXYGEN SATURATION: 96 % | HEART RATE: 81 BPM | DIASTOLIC BLOOD PRESSURE: 70 MMHG | WEIGHT: 159.2 LBS | HEIGHT: 71 IN | RESPIRATION RATE: 16 BRPM | TEMPERATURE: 99.5 F | BODY MASS INDEX: 22.29 KG/M2 | SYSTOLIC BLOOD PRESSURE: 124 MMHG

## 2018-12-19 DIAGNOSIS — IMO0001 TRANSITION OF CARE PERFORMED WITH SHARING OF CLINICAL SUMMARY: Primary | ICD-10-CM

## 2018-12-19 DIAGNOSIS — D58.2 OTHER HEMOGLOBINOPATHIES (HCC): ICD-10-CM

## 2018-12-19 PROBLEM — R60.9 DEPENDENT EDEMA: Status: ACTIVE | Noted: 2018-12-14

## 2018-12-19 PROCEDURE — 99496 TRANSJ CARE MGMT HIGH F2F 7D: CPT | Performed by: NURSE PRACTITIONER

## 2018-12-19 NOTE — PROGRESS NOTES
Transition of Care  Follow-up After Hospitalization    Joseph Sellers 47 y o  female   Date:  12/19/2018    TCM Call (since 11/18/2018)     Date and time call was made  12/17/2018  2:26 PM    Hospital care reviewed  Records reviewed    Patient was hospitialized at  [facility]    Date of Admission  12/14/18    Date of discharge  12/16/18    Diagnosis  anemia and SOB    Disposition  Home    Were the patients medications reviewed and updated  Yes    Current Symptoms  None      TCM Call (since 11/18/2018)     Post hospital issues  None    Should patient be enrolled in anticoag monitoring? No    Scheduled for follow up? Yes    Did you obtain your prescribed medications  Yes    Do you need help managing your prescriptions or medications  No    Is transportation to your appointment needed  No    I have advised the patient to call PCP with any new or worsening symptoms  [facility]        Hospital records were reviewed  Medications upon discharge reviewed/updated  Discharge Disposition:    Follow up visits with other specialists:       Assessment and Plan:    Geronimo Priest was seen today for transition of care management  Diagnoses and all orders for this visit:    Transition of care performed with sharing of clinical summary    Other hemoglobinopathies ([facility] [address] )  -     CBC and differential; Future            HPI:  47 yr old female  Her for transition of care post hospitalization at [facility] following liposuction on bilateral lower extremities few weeks ago  Pt subsequently developed dizziness, passed out and was found to Hgb 6 6 was hospitalized at [facility] 12-4-16 thru 12-16-18 and received 2 units RBC   CAL negative and pt was not scoped EGD or Colonoscopy   Post d/c pt now has seen plastic surgeon twice this week and had fluid removed from both legs and received pressure dressings and thigh high compression stockings  Pt was also advised to take Fe 325 2 X daily   Latest H& H trending upward 9 7/31 BMP is normal  Will repeat CBC in 1 mos  And advised pt to continue the Fe as ordered  ROS: Review of Systems   Constitutional: Negative for fatigue  HENT: Negative for congestion, postnasal drip, rhinorrhea, sinus pain, sore throat and trouble swallowing  Eyes: Negative for pain and visual disturbance  Respiratory: Negative for cough, shortness of breath and wheezing  Cardiovascular: Positive for leg swelling  Negative for chest pain and palpitations  Pain and weeping of both lower legs   Gastrointestinal: Negative for constipation, diarrhea, nausea and vomiting  Endocrine: Negative for polydipsia, polyphagia and polyuria  Genitourinary: Negative for difficulty urinating, flank pain, frequency and pelvic pain  Musculoskeletal: Negative for arthralgias, back pain, joint swelling and myalgias  Skin: Negative  Negative for color change and rash  Neurological: Negative for dizziness, syncope, weakness, light-headedness, numbness and headaches  Hematological: Negative for adenopathy  Does not bruise/bleed easily  Psychiatric/Behavioral: Negative for behavioral problems and sleep disturbance  The patient is not nervous/anxious  Past Medical History:   Diagnosis Date    Cervical cancer ([facility] [address] )     Depression     GERD (gastroesophageal reflux disease)     Migraine        Past Surgical History:   Procedure Laterality Date    HYSTERECTOMY      LAPAROSCOPIC SMALL BOWEL RESECTION      WRIST SURGERY Right        Social History     Social History    Marital status: /Civil Union     Spouse name: N/A    Number of children: N/A    Years of education: N/A     Social History Main Topics    Smoking status: Never Smoker    Smokeless tobacco: Never Used    Alcohol use Yes      Comment: socially    Drug use: No    Sexual activity: Not Asked     Other Topics Concern    None     Social History Narrative    None       History reviewed  No pertinent family history      No Known Allergies      Current Outpatient Prescriptions:     botulinum toxin Type A, Cosm, (BOTOX COSMETIC) 100 units SOLR, once, Disp: , Rfl:     cephalexin (KEFLEX) 500 mg capsule, Take 500 mg by mouth 3 (three) times a day, Disp: , Rfl: 0    Cholecalciferol 1000 units CHEW, Chew, Disp: , Rfl:     omeprazole (PriLOSEC) 40 MG capsule, Take 1 capsule (40 mg total) by mouth daily for 180 days, Disp: 90 capsule, Rfl: 3    rosuvastatin (CRESTOR) 10 MG tablet, Take 1 tablet (10 mg total) by mouth daily, Disp: 90 tablet, Rfl: 3    venlafaxine (EFFEXOR-XR) 75 mg 24 hr capsule, TAKE 2 CAPSULES DAILY, Disp: 180 capsule, Rfl: 5    zonisamide (ZONEGRAN) 50 MG capsule, , Disp: , Rfl:     HYDROcodone-acetaminophen (NORCO) 5-325 mg per tablet, take 1 to 2 tablets by mouth every 6 hours if needed for pain, Disp: , Rfl: 0    rizatriptan (MAXALT-MLT) 10 MG disintegrating tablet, , Disp: , Rfl:     Current Facility-Administered Medications:     ondansetron (ZOFRAN-ODT) dispersible tablet 4 mg, 4 mg, Oral, Q6H PRN, RUPINDER [place], 4 mg at 11/17/18 1717      Physical Exam:  /70   Pulse 81   Temp 99 5 °F (37 5 °C) (Tympanic)   Resp 16   Ht 5' 11" (1 803 m)   Wt 72 2 kg (159 lb 3 2 oz)   SpO2 96%   BMI 22 20 kg/m²     Physical Exam   Constitutional: She is oriented to person, place, and time  She appears well-developed and well-nourished  HENT:   Head: Normocephalic  Eyes: Pupils are equal, round, and reactive to light  Neck: Normal range of motion  Cardiovascular: Normal rate and regular rhythm  Pulmonary/Chest: Effort normal and breath sounds normal    Abdominal: Soft  Bowel sounds are normal    Musculoskeletal: Normal range of motion  Neurological: She is alert and oriented to person, place, and time  Skin: Skin is warm and dry  Serous fluid noted on bandage of LLE   Psychiatric: She has a normal mood and affect   Her behavior is normal  Judgment and thought content normal    Nursing note and vitals reviewed            Labs:  Lab Results   Component Value Date    WBC 5 04 12/18/2018    HGB 9 7 (L) 12/18/2018    HCT 31 0 (L) 12/18/2018    MCV 96 12/18/2018     12/18/2018     Lab Results   Component Value Date     11/04/2014    K 4 1 12/18/2018     (H) 12/18/2018    CO2 25 12/18/2018    ANIONGAP 10 11/04/2014    BUN 11 12/18/2018    CREATININE 0 67 12/18/2018    GLUCOSE 91 11/04/2014    GLUF 82 12/18/2018    CALCIUM 9 0 12/18/2018    CORRECTEDCA 10 0 04/04/2018    AST 21 04/04/2018    ALT 26 04/04/2018    ALKPHOS 102 04/04/2018    PROT 7 6 11/04/2014    BILITOT 0 4 11/04/2014    EGFR 101 12/18/2018

## 2019-01-25 ENCOUNTER — APPOINTMENT (OUTPATIENT)
Dept: LAB | Facility: CLINIC | Age: 54
End: 2019-01-25
Payer: COMMERCIAL

## 2019-01-25 DIAGNOSIS — D58.2 OTHER HEMOGLOBINOPATHIES (HCC): ICD-10-CM

## 2019-01-25 LAB
BASOPHILS # BLD AUTO: 0.04 THOUSANDS/ΜL (ref 0–0.1)
BASOPHILS NFR BLD AUTO: 1 % (ref 0–1)
EOSINOPHIL # BLD AUTO: 0.3 THOUSAND/ΜL (ref 0–0.61)
EOSINOPHIL NFR BLD AUTO: 5 % (ref 0–6)
ERYTHROCYTE [DISTWIDTH] IN BLOOD BY AUTOMATED COUNT: 14.3 % (ref 11.6–15.1)
HCT VFR BLD AUTO: 41 % (ref 34.8–46.1)
HGB BLD-MCNC: 13.4 G/DL (ref 11.5–15.4)
IMM GRANULOCYTES # BLD AUTO: 0.01 THOUSAND/UL (ref 0–0.2)
IMM GRANULOCYTES NFR BLD AUTO: 0 % (ref 0–2)
LYMPHOCYTES # BLD AUTO: 2.37 THOUSANDS/ΜL (ref 0.6–4.47)
LYMPHOCYTES NFR BLD AUTO: 42 % (ref 14–44)
MCH RBC QN AUTO: 31 PG (ref 26.8–34.3)
MCHC RBC AUTO-ENTMCNC: 32.7 G/DL (ref 31.4–37.4)
MCV RBC AUTO: 95 FL (ref 82–98)
MONOCYTES # BLD AUTO: 0.37 THOUSAND/ΜL (ref 0.17–1.22)
MONOCYTES NFR BLD AUTO: 7 % (ref 4–12)
NEUTROPHILS # BLD AUTO: 2.54 THOUSANDS/ΜL (ref 1.85–7.62)
NEUTS SEG NFR BLD AUTO: 45 % (ref 43–75)
NRBC BLD AUTO-RTO: 0 /100 WBCS
PLATELET # BLD AUTO: 244 THOUSANDS/UL (ref 149–390)
PMV BLD AUTO: 10.7 FL (ref 8.9–12.7)
RBC # BLD AUTO: 4.32 MILLION/UL (ref 3.81–5.12)
WBC # BLD AUTO: 5.63 THOUSAND/UL (ref 4.31–10.16)

## 2019-01-25 PROCEDURE — 36415 COLL VENOUS BLD VENIPUNCTURE: CPT

## 2019-01-25 PROCEDURE — 85025 COMPLETE CBC W/AUTO DIFF WBC: CPT

## 2019-03-09 DIAGNOSIS — E78.5 HYPERLIPIDEMIA, UNSPECIFIED HYPERLIPIDEMIA TYPE: ICD-10-CM

## 2019-03-09 RX ORDER — ROSUVASTATIN CALCIUM 10 MG/1
TABLET, COATED ORAL
Qty: 30 TABLET | Refills: 0 | Status: SHIPPED | OUTPATIENT
Start: 2019-03-09 | End: 2019-04-22 | Stop reason: SDUPTHER

## 2019-03-26 DIAGNOSIS — F33.41 RECURRENT MAJOR DEPRESSIVE DISORDER, IN PARTIAL REMISSION (HCC): ICD-10-CM

## 2019-03-28 RX ORDER — VENLAFAXINE HYDROCHLORIDE 75 MG/1
150 CAPSULE, EXTENDED RELEASE ORAL DAILY
Qty: 180 CAPSULE | Refills: 3 | Status: SHIPPED | OUTPATIENT
Start: 2019-03-28 | End: 2020-03-23

## 2019-04-18 ENCOUNTER — OFFICE VISIT (OUTPATIENT)
Dept: INTERNAL MEDICINE CLINIC | Facility: CLINIC | Age: 54
End: 2019-04-18

## 2019-04-18 VITALS
WEIGHT: 155 LBS | RESPIRATION RATE: 16 BRPM | HEART RATE: 80 BPM | DIASTOLIC BLOOD PRESSURE: 74 MMHG | TEMPERATURE: 98.1 F | SYSTOLIC BLOOD PRESSURE: 122 MMHG | BODY MASS INDEX: 21.7 KG/M2 | HEIGHT: 71 IN

## 2019-04-18 DIAGNOSIS — Z13.820 SCREENING FOR OSTEOPOROSIS: ICD-10-CM

## 2019-04-18 DIAGNOSIS — G43.009 MIGRAINE WITHOUT AURA AND WITHOUT STATUS MIGRAINOSUS, NOT INTRACTABLE: ICD-10-CM

## 2019-04-18 DIAGNOSIS — Z00.00 WELL ADULT EXAM: Primary | ICD-10-CM

## 2019-04-18 DIAGNOSIS — E55.9 VITAMIN D DEFICIENCY: ICD-10-CM

## 2019-04-18 PROCEDURE — 99499 UNLISTED E&M SERVICE: CPT | Performed by: PHYSICIAN ASSISTANT

## 2019-04-18 RX ORDER — KETOROLAC TROMETHAMINE 30 MG/ML
60 INJECTION, SOLUTION INTRAMUSCULAR; INTRAVENOUS ONCE
Status: COMPLETED | OUTPATIENT
Start: 2019-04-18 | End: 2019-04-18

## 2019-04-18 RX ADMIN — KETOROLAC TROMETHAMINE 60 MG: 30 INJECTION, SOLUTION INTRAMUSCULAR; INTRAVENOUS at 12:01

## 2019-04-22 DIAGNOSIS — E78.5 HYPERLIPIDEMIA, UNSPECIFIED HYPERLIPIDEMIA TYPE: ICD-10-CM

## 2019-04-22 RX ORDER — ROSUVASTATIN CALCIUM 10 MG/1
TABLET, COATED ORAL
Qty: 30 TABLET | Refills: 0 | Status: SHIPPED | OUTPATIENT
Start: 2019-04-22 | End: 2019-04-29 | Stop reason: SDUPTHER

## 2019-04-24 ENCOUNTER — OFFICE VISIT (OUTPATIENT)
Dept: URGENT CARE | Facility: CLINIC | Age: 54
End: 2019-04-24
Payer: COMMERCIAL

## 2019-04-24 VITALS
WEIGHT: 155 LBS | BODY MASS INDEX: 21.7 KG/M2 | RESPIRATION RATE: 16 BRPM | HEIGHT: 71 IN | HEART RATE: 80 BPM | OXYGEN SATURATION: 97 % | DIASTOLIC BLOOD PRESSURE: 58 MMHG | SYSTOLIC BLOOD PRESSURE: 105 MMHG | TEMPERATURE: 98.1 F

## 2019-04-24 DIAGNOSIS — L03.032 PARONYCHIA OF GREAT TOE, LEFT: Primary | ICD-10-CM

## 2019-04-24 PROCEDURE — 99213 OFFICE O/P EST LOW 20 MIN: CPT | Performed by: NURSE PRACTITIONER

## 2019-04-24 RX ORDER — CEPHALEXIN 500 MG/1
500 CAPSULE ORAL 4 TIMES DAILY
Qty: 28 CAPSULE | Refills: 0 | Status: SHIPPED | OUTPATIENT
Start: 2019-04-24 | End: 2019-05-01

## 2019-04-29 DIAGNOSIS — E78.5 HYPERLIPIDEMIA, UNSPECIFIED HYPERLIPIDEMIA TYPE: ICD-10-CM

## 2019-04-30 RX ORDER — ROSUVASTATIN CALCIUM 10 MG/1
TABLET, COATED ORAL
Qty: 30 TABLET | Refills: 0 | Status: SHIPPED | OUTPATIENT
Start: 2019-04-30 | End: 2019-07-08 | Stop reason: SDUPTHER

## 2019-07-08 DIAGNOSIS — E78.5 HYPERLIPIDEMIA, UNSPECIFIED HYPERLIPIDEMIA TYPE: ICD-10-CM

## 2019-07-08 RX ORDER — ROSUVASTATIN CALCIUM 10 MG/1
TABLET, COATED ORAL
Qty: 30 TABLET | Refills: 0 | Status: SHIPPED | OUTPATIENT
Start: 2019-07-08 | End: 2019-08-11 | Stop reason: SDUPTHER

## 2019-08-11 DIAGNOSIS — E78.5 HYPERLIPIDEMIA, UNSPECIFIED HYPERLIPIDEMIA TYPE: ICD-10-CM

## 2019-08-12 RX ORDER — ROSUVASTATIN CALCIUM 10 MG/1
TABLET, COATED ORAL
Qty: 30 TABLET | Refills: 0 | Status: SHIPPED | OUTPATIENT
Start: 2019-08-12 | End: 2019-09-15 | Stop reason: SDUPTHER

## 2019-09-15 DIAGNOSIS — E78.5 HYPERLIPIDEMIA, UNSPECIFIED HYPERLIPIDEMIA TYPE: ICD-10-CM

## 2019-09-16 RX ORDER — ROSUVASTATIN CALCIUM 10 MG/1
TABLET, COATED ORAL
Qty: 30 TABLET | Refills: 12 | Status: SHIPPED | OUTPATIENT
Start: 2019-09-16 | End: 2019-09-20 | Stop reason: SDUPTHER

## 2019-09-20 DIAGNOSIS — E78.5 HYPERLIPIDEMIA, UNSPECIFIED HYPERLIPIDEMIA TYPE: ICD-10-CM

## 2019-09-20 RX ORDER — ROSUVASTATIN CALCIUM 10 MG/1
10 TABLET, COATED ORAL DAILY
Qty: 90 TABLET | Refills: 5 | Status: SHIPPED | OUTPATIENT
Start: 2019-09-20 | End: 2020-12-09 | Stop reason: SDUPTHER

## 2019-10-07 ENCOUNTER — TELEPHONE (OUTPATIENT)
Dept: INTERNAL MEDICINE CLINIC | Facility: CLINIC | Age: 54
End: 2019-10-07

## 2019-10-07 NOTE — TELEPHONE ENCOUNTER
This sounds allergic and likely due to the rapid change in the weather   I would recommend Claritin and mucinex daily for the next several days

## 2019-10-07 NOTE — TELEPHONE ENCOUNTER
Pt called, c/o sinus pressure,congestion but it is clear, headache, has all weekend, took a torodol but did not help, is there anything else she can take?  Can't come in for appt because she is at [address] [place] [address] advise

## 2019-11-05 DIAGNOSIS — Z13.820 SCREENING FOR OSTEOPOROSIS: ICD-10-CM

## 2019-11-05 DIAGNOSIS — E55.9 VITAMIN D DEFICIENCY: ICD-10-CM

## 2019-11-12 DIAGNOSIS — M85.80 OSTEOPENIA, UNSPECIFIED LOCATION: Primary | ICD-10-CM

## 2019-11-12 RX ORDER — ALENDRONATE SODIUM 70 MG/1
70 TABLET ORAL
Qty: 12 TABLET | Refills: 3 | Status: SHIPPED | OUTPATIENT
Start: 2019-11-12 | End: 2021-06-02

## 2019-11-16 DIAGNOSIS — M85.80 OSTEOPENIA, UNSPECIFIED LOCATION: Primary | ICD-10-CM

## 2020-03-23 DIAGNOSIS — F33.41 RECURRENT MAJOR DEPRESSIVE DISORDER, IN PARTIAL REMISSION (HCC): ICD-10-CM

## 2020-03-23 RX ORDER — VENLAFAXINE HYDROCHLORIDE 75 MG/1
CAPSULE, EXTENDED RELEASE ORAL
Qty: 180 CAPSULE | Refills: 0 | Status: SHIPPED | OUTPATIENT
Start: 2020-03-23 | End: 2020-07-09

## 2020-05-12 ENCOUNTER — APPOINTMENT (OUTPATIENT)
Dept: LAB | Facility: CLINIC | Age: 55
End: 2020-05-12
Payer: COMMERCIAL

## 2020-05-12 ENCOUNTER — OFFICE VISIT (OUTPATIENT)
Dept: INTERNAL MEDICINE CLINIC | Facility: CLINIC | Age: 55
End: 2020-05-12

## 2020-05-12 VITALS
RESPIRATION RATE: 14 BRPM | HEART RATE: 72 BPM | OXYGEN SATURATION: 98 % | SYSTOLIC BLOOD PRESSURE: 120 MMHG | DIASTOLIC BLOOD PRESSURE: 72 MMHG | TEMPERATURE: 98.1 F | HEIGHT: 71 IN | BODY MASS INDEX: 21.7 KG/M2 | WEIGHT: 155 LBS

## 2020-05-12 DIAGNOSIS — Z12.39 SCREENING FOR BREAST CANCER: ICD-10-CM

## 2020-05-12 DIAGNOSIS — E78.5 HYPERLIPIDEMIA, UNSPECIFIED HYPERLIPIDEMIA TYPE: ICD-10-CM

## 2020-05-12 DIAGNOSIS — E55.9 VITAMIN D DEFICIENCY: ICD-10-CM

## 2020-05-12 DIAGNOSIS — Z13.1 SCREENING FOR DIABETES MELLITUS: ICD-10-CM

## 2020-05-12 DIAGNOSIS — Z13.0 SCREENING FOR DEFICIENCY ANEMIA: ICD-10-CM

## 2020-05-12 DIAGNOSIS — Z13.29 SCREENING FOR THYROID DISORDER: ICD-10-CM

## 2020-05-12 DIAGNOSIS — R63.5 WEIGHT GAIN: ICD-10-CM

## 2020-05-12 DIAGNOSIS — K59.09 CHRONIC CONSTIPATION: ICD-10-CM

## 2020-05-12 DIAGNOSIS — Z02.89 EXAMINATION, PHYSICAL, EMPLOYEE: Primary | ICD-10-CM

## 2020-05-12 LAB
25(OH)D3 SERPL-MCNC: 40.1 NG/ML (ref 30–100)
ALBUMIN SERPL BCP-MCNC: 4.1 G/DL (ref 3.5–5)
ALP SERPL-CCNC: 117 U/L (ref 46–116)
ALT SERPL W P-5'-P-CCNC: 70 U/L (ref 12–78)
ANION GAP SERPL CALCULATED.3IONS-SCNC: 3 MMOL/L (ref 4–13)
AST SERPL W P-5'-P-CCNC: 39 U/L (ref 5–45)
BASOPHILS # BLD AUTO: 0.04 THOUSANDS/ΜL (ref 0–0.1)
BASOPHILS NFR BLD AUTO: 1 % (ref 0–1)
BILIRUB SERPL-MCNC: 0.28 MG/DL (ref 0.2–1)
BUN SERPL-MCNC: 12 MG/DL (ref 5–25)
CALCIUM SERPL-MCNC: 9.9 MG/DL (ref 8.3–10.1)
CHLORIDE SERPL-SCNC: 107 MMOL/L (ref 100–108)
CHOLEST SERPL-MCNC: 173 MG/DL (ref 50–200)
CO2 SERPL-SCNC: 29 MMOL/L (ref 21–32)
CREAT SERPL-MCNC: 0.83 MG/DL (ref 0.6–1.3)
EOSINOPHIL # BLD AUTO: 0.26 THOUSAND/ΜL (ref 0–0.61)
EOSINOPHIL NFR BLD AUTO: 6 % (ref 0–6)
ERYTHROCYTE [DISTWIDTH] IN BLOOD BY AUTOMATED COUNT: 12.7 % (ref 11.6–15.1)
GFR SERPL CREATININE-BSD FRML MDRD: 81 ML/MIN/1.73SQ M
GLUCOSE P FAST SERPL-MCNC: 85 MG/DL (ref 65–99)
HCT VFR BLD AUTO: 41.8 % (ref 34.8–46.1)
HDLC SERPL-MCNC: 86 MG/DL
HGB BLD-MCNC: 13.6 G/DL (ref 11.5–15.4)
IMM GRANULOCYTES # BLD AUTO: 0.01 THOUSAND/UL (ref 0–0.2)
IMM GRANULOCYTES NFR BLD AUTO: 0 % (ref 0–2)
LDLC SERPL CALC-MCNC: 80 MG/DL (ref 0–100)
LYMPHOCYTES # BLD AUTO: 1.88 THOUSANDS/ΜL (ref 0.6–4.47)
LYMPHOCYTES NFR BLD AUTO: 47 % (ref 14–44)
MCH RBC QN AUTO: 30 PG (ref 26.8–34.3)
MCHC RBC AUTO-ENTMCNC: 32.5 G/DL (ref 31.4–37.4)
MCV RBC AUTO: 92 FL (ref 82–98)
MONOCYTES # BLD AUTO: 0.3 THOUSAND/ΜL (ref 0.17–1.22)
MONOCYTES NFR BLD AUTO: 7 % (ref 4–12)
NEUTROPHILS # BLD AUTO: 1.57 THOUSANDS/ΜL (ref 1.85–7.62)
NEUTS SEG NFR BLD AUTO: 39 % (ref 43–75)
NONHDLC SERPL-MCNC: 87 MG/DL
NRBC BLD AUTO-RTO: 0 /100 WBCS
PLATELET # BLD AUTO: 228 THOUSANDS/UL (ref 149–390)
PMV BLD AUTO: 10.4 FL (ref 8.9–12.7)
POTASSIUM SERPL-SCNC: 4.2 MMOL/L (ref 3.5–5.3)
PROT SERPL-MCNC: 7.8 G/DL (ref 6.4–8.2)
RBC # BLD AUTO: 4.53 MILLION/UL (ref 3.81–5.12)
SODIUM SERPL-SCNC: 139 MMOL/L (ref 136–145)
TRIGL SERPL-MCNC: 36 MG/DL
TSH SERPL DL<=0.05 MIU/L-ACNC: 1.96 UIU/ML (ref 0.36–3.74)
WBC # BLD AUTO: 4.06 THOUSAND/UL (ref 4.31–10.16)

## 2020-05-12 PROCEDURE — 84443 ASSAY THYROID STIM HORMONE: CPT

## 2020-05-12 PROCEDURE — 82306 VITAMIN D 25 HYDROXY: CPT

## 2020-05-12 PROCEDURE — 85025 COMPLETE CBC W/AUTO DIFF WBC: CPT

## 2020-05-12 PROCEDURE — 36415 COLL VENOUS BLD VENIPUNCTURE: CPT

## 2020-05-12 PROCEDURE — 80061 LIPID PANEL: CPT

## 2020-05-12 PROCEDURE — 80053 COMPREHEN METABOLIC PANEL: CPT

## 2020-05-12 PROCEDURE — 99499 UNLISTED E&M SERVICE: CPT | Performed by: PHYSICIAN ASSISTANT

## 2020-05-12 RX ORDER — DIHYDROERGOTAMINE MESYLATE 4 MG/ML
1 SPRAY NASAL
COMMUNITY
Start: 2020-02-05 | End: 2022-01-10

## 2020-05-12 RX ORDER — BUPROPION HYDROCHLORIDE 150 MG/1
150 TABLET ORAL EVERY MORNING
Qty: 30 TABLET | Refills: 5 | Status: SHIPPED | OUTPATIENT
Start: 2020-05-12 | End: 2020-06-10 | Stop reason: SDUPTHER

## 2020-05-12 RX ORDER — GALCANEZUMAB 120 MG/ML
INJECTION, SOLUTION SUBCUTANEOUS
COMMUNITY
Start: 2020-05-11 | End: 2022-06-20 | Stop reason: SDUPTHER

## 2020-05-12 RX ORDER — KETOROLAC TROMETHAMINE 10 MG/1
TABLET, FILM COATED ORAL
COMMUNITY
Start: 2020-05-10

## 2020-05-12 RX ORDER — KETOROLAC TROMETHAMINE 30 MG/ML
INJECTION, SOLUTION INTRAMUSCULAR; INTRAVENOUS
COMMUNITY
Start: 2020-02-11

## 2020-06-10 DIAGNOSIS — R63.5 WEIGHT GAIN: ICD-10-CM

## 2020-06-10 RX ORDER — BUPROPION HYDROCHLORIDE 150 MG/1
150 TABLET ORAL EVERY MORNING
Qty: 90 TABLET | Refills: 1 | Status: SHIPPED | OUTPATIENT
Start: 2020-06-10 | End: 2020-07-27 | Stop reason: SDUPTHER

## 2020-06-15 DIAGNOSIS — R63.5 WEIGHT GAIN: Primary | ICD-10-CM

## 2020-06-15 RX ORDER — PHENTERMINE HYDROCHLORIDE 37.5 MG/1
37.5 TABLET ORAL DAILY
Qty: 30 TABLET | Refills: 2 | Status: SHIPPED | OUTPATIENT
Start: 2020-06-15 | End: 2021-06-02

## 2020-07-09 DIAGNOSIS — F33.41 RECURRENT MAJOR DEPRESSIVE DISORDER, IN PARTIAL REMISSION (HCC): ICD-10-CM

## 2020-07-09 DIAGNOSIS — F41.0 PANIC ATTACKS: Primary | ICD-10-CM

## 2020-07-09 RX ORDER — BUSPIRONE HYDROCHLORIDE 5 MG/1
5 TABLET ORAL 3 TIMES DAILY
Qty: 90 TABLET | Refills: 0 | Status: SHIPPED | OUTPATIENT
Start: 2020-07-09 | End: 2021-08-04

## 2020-07-09 RX ORDER — VENLAFAXINE HYDROCHLORIDE 75 MG/1
75 CAPSULE, EXTENDED RELEASE ORAL 3 TIMES DAILY
Qty: 180 CAPSULE | Refills: 1 | Status: SHIPPED | OUTPATIENT
Start: 2020-07-09 | End: 2020-09-08 | Stop reason: SDUPTHER

## 2020-07-27 DIAGNOSIS — R63.5 WEIGHT GAIN: ICD-10-CM

## 2020-07-27 RX ORDER — BUPROPION HYDROCHLORIDE 150 MG/1
150 TABLET ORAL 2 TIMES DAILY
Qty: 20 TABLET | Refills: 0 | Status: SHIPPED | OUTPATIENT
Start: 2020-07-27 | End: 2021-01-12 | Stop reason: SDUPTHER

## 2020-07-27 RX ORDER — BUPROPION HYDROCHLORIDE 150 MG/1
150 TABLET ORAL 2 TIMES DAILY
Qty: 180 TABLET | Refills: 1 | Status: SHIPPED | OUTPATIENT
Start: 2020-07-27 | End: 2020-07-27 | Stop reason: SDUPTHER

## 2020-08-17 DIAGNOSIS — F41.0 PANIC ATTACKS: Primary | ICD-10-CM

## 2020-08-17 RX ORDER — LORAZEPAM 0.5 MG/1
0.5 TABLET ORAL 2 TIMES DAILY
Qty: 60 TABLET | Refills: 1 | Status: SHIPPED | OUTPATIENT
Start: 2020-08-17 | End: 2021-06-02

## 2020-09-08 ENCOUNTER — TELEPHONE (OUTPATIENT)
Dept: INTERNAL MEDICINE CLINIC | Facility: CLINIC | Age: 55
End: 2020-09-08

## 2020-09-08 DIAGNOSIS — F33.41 RECURRENT MAJOR DEPRESSIVE DISORDER, IN PARTIAL REMISSION (HCC): ICD-10-CM

## 2020-09-08 RX ORDER — VENLAFAXINE HYDROCHLORIDE 75 MG/1
75 CAPSULE, EXTENDED RELEASE ORAL 3 TIMES DAILY
Qty: 180 CAPSULE | Refills: 1 | Status: SHIPPED | OUTPATIENT
Start: 2020-09-08 | End: 2020-09-09 | Stop reason: SDUPTHER

## 2020-09-08 NOTE — TELEPHONE ENCOUNTER
Pt needs rx for venlafaxine (EFFEXOR-XR) 75 mg 24 hr capsule, needs 90 day sent to [facility], needs 1 wk supply sent to [facility]

## 2020-09-09 DIAGNOSIS — F33.41 RECURRENT MAJOR DEPRESSIVE DISORDER, IN PARTIAL REMISSION (HCC): ICD-10-CM

## 2020-09-09 RX ORDER — VENLAFAXINE HYDROCHLORIDE 75 MG/1
75 CAPSULE, EXTENDED RELEASE ORAL 3 TIMES DAILY
Qty: 21 CAPSULE | Refills: 0 | Status: SHIPPED | OUTPATIENT
Start: 2020-09-09 | End: 2020-11-13 | Stop reason: SDUPTHER

## 2020-11-10 ENCOUNTER — OFFICE VISIT (OUTPATIENT)
Dept: URGENT CARE | Age: 55
End: 2020-11-10
Payer: COMMERCIAL

## 2020-11-10 VITALS
SYSTOLIC BLOOD PRESSURE: 107 MMHG | TEMPERATURE: 98.1 F | OXYGEN SATURATION: 100 % | HEIGHT: 71 IN | BODY MASS INDEX: 18.76 KG/M2 | WEIGHT: 134 LBS | RESPIRATION RATE: 16 BRPM | DIASTOLIC BLOOD PRESSURE: 68 MMHG | HEART RATE: 77 BPM

## 2020-11-10 DIAGNOSIS — L03.011 PARONYCHIA OF RIGHT MIDDLE FINGER: Primary | ICD-10-CM

## 2020-11-10 PROCEDURE — 99213 OFFICE O/P EST LOW 20 MIN: CPT | Performed by: NURSE PRACTITIONER

## 2020-11-10 RX ORDER — CEPHALEXIN 500 MG/1
500 CAPSULE ORAL EVERY 12 HOURS SCHEDULED
Qty: 14 CAPSULE | Refills: 0 | Status: SHIPPED | OUTPATIENT
Start: 2020-11-10 | End: 2020-11-17

## 2020-11-13 DIAGNOSIS — F33.41 RECURRENT MAJOR DEPRESSIVE DISORDER, IN PARTIAL REMISSION (HCC): ICD-10-CM

## 2020-11-13 RX ORDER — VENLAFAXINE HYDROCHLORIDE 75 MG/1
75 CAPSULE, EXTENDED RELEASE ORAL 3 TIMES DAILY
Qty: 270 CAPSULE | Refills: 1 | Status: SHIPPED | OUTPATIENT
Start: 2020-11-13 | End: 2020-11-16 | Stop reason: SDUPTHER

## 2020-11-16 DIAGNOSIS — F33.41 RECURRENT MAJOR DEPRESSIVE DISORDER, IN PARTIAL REMISSION (HCC): ICD-10-CM

## 2020-11-16 RX ORDER — VENLAFAXINE HYDROCHLORIDE 75 MG/1
75 CAPSULE, EXTENDED RELEASE ORAL 3 TIMES DAILY
Qty: 30 CAPSULE | Refills: 1 | Status: SHIPPED | OUTPATIENT
Start: 2020-11-16 | End: 2021-01-10 | Stop reason: SDUPTHER

## 2020-12-09 DIAGNOSIS — E78.5 HYPERLIPIDEMIA, UNSPECIFIED HYPERLIPIDEMIA TYPE: ICD-10-CM

## 2020-12-09 RX ORDER — ROSUVASTATIN CALCIUM 10 MG/1
10 TABLET, COATED ORAL DAILY
Qty: 90 TABLET | Refills: 1 | Status: SHIPPED | OUTPATIENT
Start: 2020-12-09 | End: 2021-06-07

## 2021-01-10 DIAGNOSIS — F33.41 RECURRENT MAJOR DEPRESSIVE DISORDER, IN PARTIAL REMISSION (HCC): ICD-10-CM

## 2021-01-11 RX ORDER — VENLAFAXINE HYDROCHLORIDE 75 MG/1
75 CAPSULE, EXTENDED RELEASE ORAL 3 TIMES DAILY
Qty: 270 CAPSULE | Refills: 1 | Status: SHIPPED | OUTPATIENT
Start: 2021-01-11 | End: 2021-05-24

## 2021-01-12 DIAGNOSIS — R63.5 WEIGHT GAIN: ICD-10-CM

## 2021-01-13 RX ORDER — BUPROPION HYDROCHLORIDE 150 MG/1
150 TABLET ORAL 2 TIMES DAILY
Qty: 180 TABLET | Refills: 1 | Status: SHIPPED | OUTPATIENT
Start: 2021-01-13 | End: 2021-08-19

## 2021-04-30 ENCOUNTER — TELEPHONE (OUTPATIENT)
Dept: INTERNAL MEDICINE CLINIC | Facility: CLINIC | Age: 56
End: 2021-04-30

## 2021-04-30 NOTE — TELEPHONE ENCOUNTER
----- Message from Dale Signs sent at 4/29/2021  5:11 PM EDT -----  Regarding: RE: Visit Follow-Up Question  Contact: [phone]  I just got home from work  I will talk to him and let you know        Thank you

## 2021-05-12 ENCOUNTER — TELEPHONE (OUTPATIENT)
Dept: INTERNAL MEDICINE CLINIC | Facility: CLINIC | Age: 56
End: 2021-05-12

## 2021-05-12 NOTE — TELEPHONE ENCOUNTER
----- Message from Jin Nguyen PA-C sent at 5/12/2021 10:37 AM EDT -----  Regarding: FW: Visit Follow-Up Question  Contact: [phone]  Please call and offer 10am with me on Friday, thanks  ----- Message -----  From: Yenny Chester  Sent: 5/12/2021   8:06 AM EDT  To: Jin Nguyen PA-C  Subject: FW: Visit Follow-Up Question                       ----- Message -----  From: Ousmane Garcia  Sent: 5/11/2021   6:33 PM EDT  To: [address] [facility],[address] Clinical  Subject: Visit Follow-Up Question                         Elizabeth Davis has an appointment with Lul Byers this Friday  Is it possible for you to see him instead? I would rather have you because you know him better        Ousmane Garcia

## 2021-05-18 ENCOUNTER — TELEPHONE (OUTPATIENT)
Dept: INTERNAL MEDICINE CLINIC | Facility: CLINIC | Age: 56
End: 2021-05-18

## 2021-05-18 NOTE — TELEPHONE ENCOUNTER
----- Message from Shaunna Canas sent at 5/18/2021 12:43 PM EDT -----  Regarding: RE: Prescription Question  Contact: [phone]  OK that's fine      Tianna Hinton

## 2021-05-19 ENCOUNTER — TELEPHONE (OUTPATIENT)
Dept: INTERNAL MEDICINE CLINIC | Facility: CLINIC | Age: 56
End: 2021-05-19

## 2021-05-19 NOTE — TELEPHONE ENCOUNTER
----- Message from Ric Foster PA-C sent at 5/19/2021 12:52 PM EDT -----  Regarding: FW: Non-Urgent Medical Question  Contact: [phone]  Ok to move to my schedule for early june  ----- Message -----  From: Srinivasan Llanes  Sent: 5/19/2021  10:01 AM EDT  To: Ric Foster PA-C  Subject: FW: Non-Urgent Medical Question                    ----- Message -----  From: Angeles Damico  Sent: 5/19/2021   9:38 AM EDT  To: [address] Clinical  Subject: Non-Urgent Medical Question                      Good morning Debbie,    I have my yearly physical for work 06/04/21  The girls scheduled me with Foster Less  Any chance you could see me instead? I would rather see you cause you know all about me       Thank you,  Angeles Damico

## 2021-05-24 DIAGNOSIS — F32.A DEPRESSION, UNSPECIFIED DEPRESSION TYPE: Primary | ICD-10-CM

## 2021-05-24 RX ORDER — VENLAFAXINE HYDROCHLORIDE 150 MG/1
150 CAPSULE, EXTENDED RELEASE ORAL 2 TIMES DAILY
Qty: 180 CAPSULE | Refills: 1 | Status: SHIPPED | OUTPATIENT
Start: 2021-05-24 | End: 2021-07-25 | Stop reason: SDUPTHER

## 2021-05-26 ENCOUNTER — RA CDI HCC (OUTPATIENT)
Dept: OTHER | Facility: HOSPITAL | Age: 56
End: 2021-05-26

## 2021-05-26 NOTE — PROGRESS NOTES
[facility]  coding opportunities          Chart reviewed, no opportunity found: CHART REVIEWED, NO OPPORTUNITY FOUND              Patients insurance company:  [employer] [facility] (Medicare Advantage and Commercial)

## 2021-05-28 ENCOUNTER — TRANSCRIBE ORDERS (OUTPATIENT)
Dept: RADIOLOGY | Facility: CLINIC | Age: 56
End: 2021-05-28

## 2021-05-28 ENCOUNTER — APPOINTMENT (OUTPATIENT)
Dept: LAB | Facility: CLINIC | Age: 56
End: 2021-05-28
Payer: COMMERCIAL

## 2021-05-28 DIAGNOSIS — L65.9 HAIR LOSS: Primary | ICD-10-CM

## 2021-05-28 DIAGNOSIS — L65.9 HAIR LOSS: ICD-10-CM

## 2021-05-28 LAB — TSH SERPL DL<=0.05 MIU/L-ACNC: 2.05 UIU/ML (ref 0.36–3.74)

## 2021-05-28 PROCEDURE — 84443 ASSAY THYROID STIM HORMONE: CPT

## 2021-05-28 PROCEDURE — 36415 COLL VENOUS BLD VENIPUNCTURE: CPT

## 2021-06-02 ENCOUNTER — OFFICE VISIT (OUTPATIENT)
Dept: INTERNAL MEDICINE CLINIC | Facility: CLINIC | Age: 56
End: 2021-06-02

## 2021-06-02 VITALS
TEMPERATURE: 97.9 F | HEIGHT: 71 IN | BODY MASS INDEX: 18.76 KG/M2 | DIASTOLIC BLOOD PRESSURE: 68 MMHG | SYSTOLIC BLOOD PRESSURE: 116 MMHG | RESPIRATION RATE: 14 BRPM | WEIGHT: 134 LBS

## 2021-06-02 DIAGNOSIS — E28.39 PRIMARY OVARIAN FAILURE: ICD-10-CM

## 2021-06-02 DIAGNOSIS — Z12.31 VISIT FOR SCREENING MAMMOGRAM: ICD-10-CM

## 2021-06-02 DIAGNOSIS — Z02.89 PHYSICAL EXAMINATION OF EMPLOYEE: Primary | ICD-10-CM

## 2021-06-02 DIAGNOSIS — E78.5 HYPERLIPIDEMIA, UNSPECIFIED HYPERLIPIDEMIA TYPE: ICD-10-CM

## 2021-06-02 DIAGNOSIS — Z13.1 SCREENING FOR DIABETES MELLITUS: ICD-10-CM

## 2021-06-02 DIAGNOSIS — Z13.0 SCREENING FOR DEFICIENCY ANEMIA: ICD-10-CM

## 2021-06-02 DIAGNOSIS — C53.8 MALIGNANT NEOPLASM OF OVERLAPPING SITES OF CERVIX (HCC): ICD-10-CM

## 2021-06-02 DIAGNOSIS — E55.9 VITAMIN D DEFICIENCY: ICD-10-CM

## 2021-06-02 PROCEDURE — 99499 UNLISTED E&M SERVICE: CPT | Performed by: PHYSICIAN ASSISTANT

## 2021-06-02 RX ORDER — NICOTINE POLACRILEX 2 MG
1 GUM BUCCAL DAILY
Start: 2021-06-02

## 2021-06-02 RX ORDER — MELATONIN
1000 DAILY
Start: 2021-06-02

## 2021-06-02 NOTE — PROGRESS NOTES
Assessment/Plan:  Problem List Items Addressed This Visit        Genitourinary    Malignant neoplasm of overlapping sites of cervix ([facility] )       Other    Hyperlipidemia    Relevant Orders    Lipid panel    Vitamin D deficiency    Relevant Medications    cholecalciferol (VITAMIN D3) 1,000 units tablet    Biotin 1 MG CAPS    Other Relevant Orders    Vitamin D 25 hydroxy    Physical examination of employee - Primary      Other Visit Diagnoses     Screening for diabetes mellitus        Relevant Orders    Comprehensive metabolic panel    Screening for deficiency anemia        Relevant Orders    CBC and differential    Primary ovarian failure        Relevant Orders    DXA bone density spine hip and pelvis    Visit for screening mammogram        Relevant Orders    Mammo screening bilateral w 3d & cad           Diagnoses and all orders for this visit:    Physical examination of employee    Malignant neoplasm of overlapping sites of cervix ([facility] )    Vitamin D deficiency  -     cholecalciferol (VITAMIN D3) 1,000 units tablet; Take 1 tablet (1,000 Units total) by mouth daily  -     Biotin 1 MG CAPS; Take 1 capsule (1 mg total) by mouth daily  -     Vitamin D 25 hydroxy; Future    Hyperlipidemia, unspecified hyperlipidemia type  -     Lipid panel; Future    Screening for diabetes mellitus  -     Comprehensive metabolic panel; Future    Screening for deficiency anemia  -     CBC and differential; Future    Primary ovarian failure  -     DXA bone density spine hip and pelvis; Future    Visit for screening mammogram  -     Mammo screening bilateral w 3d & cad; Future        No problem-specific Assessment & Plan notes found for this encounter  Subjective:      Patient ID: Garcia Carlson is a 55 y o  female  Pt presents for [facility] physical  She is doing well overall  BP is normal  Vision is 20/20 OU  Hearing is normal  No hx of diabetes or HTN   No orthopedic or rheumatologic condition that impairs her ability to perform her job   No history of substance abuse  No psychiatric illness that impairs her ability to safely perform her job  She feels well overall  She is due for labs, mammogram and dexa scan  The following portions of the patient's history were reviewed and updated as appropriate:   She has a past medical history of Cervical cancer ([address] ), Depression, GERD (gastroesophageal reflux disease), and Migraine  ,  does not have any pertinent problems on file  ,   has a past surgical history that includes Wrist surgery (Right); Hysterectomy; and Laparoscopic small bowel resection  ,  family history includes No Known Problems in her father and mother  ,   reports that she has never smoked  She has never used smokeless tobacco  She reports current alcohol use  She reports that she does not use drugs  ,  has No Known Allergies     Current Outpatient Medications   Medication Sig Dispense Refill    buPROPion (WELLBUTRIN XL) 150 mg 24 hr tablet Take 1 tablet (150 mg total) by mouth 2 (two) times a day 180 tablet 1    Cholecalciferol 1000 units CHEW Chew      dihydroergotamine (MIGRANAL) 4 MG/ML nasal spray 1 spray into each nostril      EMGALITY 120 MG/ML SOSY INJECT 2 SYRINGES (240MG) AS A LOADING DOSE MONTH ONE  THEN 1 S   (REFER TO PRESCRIPTION NOTES)   ketorolac (TORADOL) 10 mg tablet take 1 tablet by mouth every 6 hours if needed for MODERATE PAIN      (REFER TO PRESCRIPTION NOTES)        ketorolac (TORADOL) 30 mg/mL injection ketorolac 30 mg/mL (1 mL) injection solution      rosuvastatin (CRESTOR) 10 MG tablet Take 1 tablet (10 mg total) by mouth daily 90 tablet 1    venlafaxine (EFFEXOR-XR) 150 mg 24 hr capsule Take 1 capsule (150 mg total) by mouth 2 (two) times a day 180 capsule 1    Biotin 1 MG CAPS Take 1 capsule (1 mg total) by mouth daily      busPIRone (BUSPAR) 5 mg tablet Take 1 tablet (5 mg total) by mouth 3 (three) times a day (Patient not taking: Reported on 6/2/2021) 90 tablet 0    cholecalciferol (VITAMIN D3) 1,000 units tablet Take 1 tablet (1,000 Units total) by mouth daily       Current Facility-Administered Medications   Medication Dose Route Frequency Provider Last Rate Last Admin    ondansetron (ZOFRAN-ODT) dispersible tablet 4 mg  4 mg Oral Q6H PRN RUPINDER Zimmerman   4 mg at 18 1717       Review of Systems   Constitutional: Negative for chills and fever  HENT: Negative for congestion, ear pain, hearing loss, postnasal drip, rhinorrhea, sinus pressure, sinus pain, sore throat and trouble swallowing  Eyes: Negative for pain and visual disturbance  Respiratory: Negative for cough, chest tightness, shortness of breath and wheezing  Cardiovascular: Negative  Negative for chest pain, palpitations and leg swelling  Gastrointestinal: Negative for abdominal pain, blood in stool, constipation, diarrhea, nausea and vomiting  Endocrine: Negative for cold intolerance, heat intolerance, polydipsia, polyphagia and polyuria  Genitourinary: Negative for difficulty urinating, dysuria, flank pain and urgency  Musculoskeletal: Negative for arthralgias, back pain, gait problem and myalgias  Skin: Negative for rash  Allergic/Immunologic: Negative  Neurological: Negative for dizziness, weakness, light-headedness and headaches  Hematological: Negative  Psychiatric/Behavioral: Negative for behavioral problems, dysphoric mood and sleep disturbance  The patient is not nervous/anxious            PHQ-9 Depression Screening    PHQ-9:   Frequency of the following problems over the past two weeks:      Little interest or pleasure in doing things: 1 - several days  Feeling down, depressed, or hopeless: 1 - several days  Trouble falling or staying asleep, or sleeping too much: 0 - not at all  Feeling tired or having little energy: 2 - more than half the days  Poor appetite or overeatin - not at all  Feeling bad about yourself - or that you are a failure or have let yourself or your family down: 0 - not at all  Trouble concentrating on things, such as reading the newspaper or watching television: 0 - not at all  Moving or speaking so slowly that other people could have noticed  Or the opposite - being so fidgety or restless that you have been moving around a lot more than usual: 0 - not at all  Thoughts that you would be better off dead, or of hurting yourself in some way: 0 - not at all          Objective:  Vitals:    06/02/21 0955   BP: 116/68   Resp: 14   Temp: 97 9 °F (36 6 °C)   TempSrc: Tympanic   Weight: 60 8 kg (134 lb)   Height: 5' 11" (1 803 m)     Body mass index is 18 69 kg/m²  Physical Exam  Constitutional:       General: She is not in acute distress  Appearance: She is well-developed  She is not diaphoretic  HENT:      Head: Normocephalic and atraumatic  Right Ear: External ear normal       Left Ear: External ear normal       Nose: Nose normal       Mouth/Throat:      Pharynx: No oropharyngeal exudate  Eyes:      General: No scleral icterus  Right eye: No discharge  Left eye: No discharge  Conjunctiva/sclera: Conjunctivae normal       Pupils: Pupils are equal, round, and reactive to light  Neck:      Musculoskeletal: Normal range of motion and neck supple  Thyroid: No thyromegaly  Cardiovascular:      Rate and Rhythm: Normal rate and regular rhythm  Heart sounds: Normal heart sounds  No murmur  No friction rub  No gallop  Pulmonary:      Effort: Pulmonary effort is normal  No respiratory distress  Breath sounds: Normal breath sounds  No wheezing or rales  Abdominal:      General: Bowel sounds are normal  There is no distension  Palpations: Abdomen is soft  Tenderness: There is no abdominal tenderness  Musculoskeletal: Normal range of motion  General: No tenderness or deformity  Skin:     General: Skin is warm and dry  Neurological:      Mental Status: She is alert and oriented to person, place, and time  Cranial Nerves: No cranial nerve deficit  Psychiatric:         Behavior: Behavior normal          Thought Content:  Thought content normal          Judgment: Judgment normal

## 2021-06-07 DIAGNOSIS — E78.5 HYPERLIPIDEMIA, UNSPECIFIED HYPERLIPIDEMIA TYPE: ICD-10-CM

## 2021-06-07 RX ORDER — ROSUVASTATIN CALCIUM 10 MG/1
TABLET, COATED ORAL
Qty: 90 TABLET | Refills: 3 | Status: SHIPPED | OUTPATIENT
Start: 2021-06-07 | End: 2022-06-02

## 2021-06-23 ENCOUNTER — APPOINTMENT (OUTPATIENT)
Dept: LAB | Facility: CLINIC | Age: 56
End: 2021-06-23
Payer: COMMERCIAL

## 2021-06-23 DIAGNOSIS — Z13.0 SCREENING FOR DEFICIENCY ANEMIA: ICD-10-CM

## 2021-06-23 DIAGNOSIS — E78.5 HYPERLIPIDEMIA, UNSPECIFIED HYPERLIPIDEMIA TYPE: ICD-10-CM

## 2021-06-23 DIAGNOSIS — Z13.1 SCREENING FOR DIABETES MELLITUS: ICD-10-CM

## 2021-06-23 DIAGNOSIS — E55.9 VITAMIN D DEFICIENCY: ICD-10-CM

## 2021-06-23 LAB
25(OH)D3 SERPL-MCNC: 39.7 NG/ML (ref 30–100)
ALBUMIN SERPL BCP-MCNC: 4.1 G/DL (ref 3.5–5)
ALP SERPL-CCNC: 112 U/L (ref 46–116)
ALT SERPL W P-5'-P-CCNC: 131 U/L (ref 12–78)
ANION GAP SERPL CALCULATED.3IONS-SCNC: 4 MMOL/L (ref 4–13)
AST SERPL W P-5'-P-CCNC: 57 U/L (ref 5–45)
BASOPHILS # BLD AUTO: 0.03 THOUSANDS/ΜL (ref 0–0.1)
BASOPHILS NFR BLD AUTO: 1 % (ref 0–1)
BILIRUB SERPL-MCNC: 0.54 MG/DL (ref 0.2–1)
BUN SERPL-MCNC: 16 MG/DL (ref 5–25)
CALCIUM SERPL-MCNC: 9.7 MG/DL (ref 8.3–10.1)
CHLORIDE SERPL-SCNC: 103 MMOL/L (ref 100–108)
CHOLEST SERPL-MCNC: 168 MG/DL (ref 50–200)
CO2 SERPL-SCNC: 30 MMOL/L (ref 21–32)
CREAT SERPL-MCNC: 0.75 MG/DL (ref 0.6–1.3)
EOSINOPHIL # BLD AUTO: 0.16 THOUSAND/ΜL (ref 0–0.61)
EOSINOPHIL NFR BLD AUTO: 3 % (ref 0–6)
ERYTHROCYTE [DISTWIDTH] IN BLOOD BY AUTOMATED COUNT: 12.5 % (ref 11.6–15.1)
GFR SERPL CREATININE-BSD FRML MDRD: 90 ML/MIN/1.73SQ M
GLUCOSE P FAST SERPL-MCNC: 79 MG/DL (ref 65–99)
HCT VFR BLD AUTO: 40.6 % (ref 34.8–46.1)
HDLC SERPL-MCNC: 100 MG/DL
HGB BLD-MCNC: 13 G/DL (ref 11.5–15.4)
IMM GRANULOCYTES # BLD AUTO: 0.01 THOUSAND/UL (ref 0–0.2)
IMM GRANULOCYTES NFR BLD AUTO: 0 % (ref 0–2)
LDLC SERPL CALC-MCNC: 60 MG/DL (ref 0–100)
LYMPHOCYTES # BLD AUTO: 1.33 THOUSANDS/ΜL (ref 0.6–4.47)
LYMPHOCYTES NFR BLD AUTO: 28 % (ref 14–44)
MCH RBC QN AUTO: 29.7 PG (ref 26.8–34.3)
MCHC RBC AUTO-ENTMCNC: 32 G/DL (ref 31.4–37.4)
MCV RBC AUTO: 93 FL (ref 82–98)
MONOCYTES # BLD AUTO: 0.33 THOUSAND/ΜL (ref 0.17–1.22)
MONOCYTES NFR BLD AUTO: 7 % (ref 4–12)
NEUTROPHILS # BLD AUTO: 2.83 THOUSANDS/ΜL (ref 1.85–7.62)
NEUTS SEG NFR BLD AUTO: 61 % (ref 43–75)
NONHDLC SERPL-MCNC: 68 MG/DL
NRBC BLD AUTO-RTO: 0 /100 WBCS
PLATELET # BLD AUTO: 220 THOUSANDS/UL (ref 149–390)
PMV BLD AUTO: 10.2 FL (ref 8.9–12.7)
POTASSIUM SERPL-SCNC: 4 MMOL/L (ref 3.5–5.3)
PROT SERPL-MCNC: 7.6 G/DL (ref 6.4–8.2)
RBC # BLD AUTO: 4.38 MILLION/UL (ref 3.81–5.12)
SODIUM SERPL-SCNC: 137 MMOL/L (ref 136–145)
TRIGL SERPL-MCNC: 40 MG/DL
WBC # BLD AUTO: 4.69 THOUSAND/UL (ref 4.31–10.16)

## 2021-06-23 PROCEDURE — 85025 COMPLETE CBC W/AUTO DIFF WBC: CPT

## 2021-06-23 PROCEDURE — 80053 COMPREHEN METABOLIC PANEL: CPT

## 2021-06-23 PROCEDURE — 80061 LIPID PANEL: CPT

## 2021-06-23 PROCEDURE — 36415 COLL VENOUS BLD VENIPUNCTURE: CPT

## 2021-06-23 PROCEDURE — 82306 VITAMIN D 25 HYDROXY: CPT

## 2021-07-02 DIAGNOSIS — K59.09 CHRONIC CONSTIPATION: Primary | ICD-10-CM

## 2021-07-23 ENCOUNTER — APPOINTMENT (OUTPATIENT)
Dept: LAB | Facility: CLINIC | Age: 56
End: 2021-07-23
Payer: COMMERCIAL

## 2021-07-23 DIAGNOSIS — R79.89 ELEVATED LIVER FUNCTION TESTS: ICD-10-CM

## 2021-07-23 DIAGNOSIS — R79.89 ELEVATED LIVER FUNCTION TESTS: Primary | ICD-10-CM

## 2021-07-23 LAB
ALBUMIN SERPL BCP-MCNC: 4 G/DL (ref 3.5–5)
ALP SERPL-CCNC: 94 U/L (ref 46–116)
ALT SERPL W P-5'-P-CCNC: 91 U/L (ref 12–78)
AST SERPL W P-5'-P-CCNC: 34 U/L (ref 5–45)
BILIRUB DIRECT SERPL-MCNC: 0.12 MG/DL (ref 0–0.2)
BILIRUB SERPL-MCNC: 0.39 MG/DL (ref 0.2–1)
PROT SERPL-MCNC: 7.4 G/DL (ref 6.4–8.2)

## 2021-07-23 PROCEDURE — 36415 COLL VENOUS BLD VENIPUNCTURE: CPT

## 2021-07-23 PROCEDURE — 80076 HEPATIC FUNCTION PANEL: CPT

## 2021-07-25 DIAGNOSIS — F32.A DEPRESSION, UNSPECIFIED DEPRESSION TYPE: ICD-10-CM

## 2021-07-26 RX ORDER — VENLAFAXINE HYDROCHLORIDE 150 MG/1
150 CAPSULE, EXTENDED RELEASE ORAL 2 TIMES DAILY
Qty: 180 CAPSULE | Refills: 0 | Status: SHIPPED | OUTPATIENT
Start: 2021-07-26 | End: 2021-08-04 | Stop reason: SDUPTHER

## 2021-08-04 DIAGNOSIS — F32.A DEPRESSION, UNSPECIFIED DEPRESSION TYPE: Primary | ICD-10-CM

## 2021-08-04 DIAGNOSIS — F32.A DEPRESSION, UNSPECIFIED DEPRESSION TYPE: ICD-10-CM

## 2021-08-04 RX ORDER — VENLAFAXINE HYDROCHLORIDE 150 MG/1
150 CAPSULE, EXTENDED RELEASE ORAL 2 TIMES DAILY
Qty: 28 CAPSULE | Refills: 0 | Status: SHIPPED | OUTPATIENT
Start: 2021-08-04 | End: 2021-09-13

## 2021-08-04 RX ORDER — VENLAFAXINE HYDROCHLORIDE 150 MG/1
150 CAPSULE, EXTENDED RELEASE ORAL 2 TIMES DAILY
Qty: 60 CAPSULE | Refills: 0 | Status: SHIPPED | OUTPATIENT
Start: 2021-08-04 | End: 2021-08-11 | Stop reason: SDUPTHER

## 2021-08-13 ENCOUNTER — TELEPHONE (OUTPATIENT)
Dept: INTERNAL MEDICINE CLINIC | Facility: CLINIC | Age: 56
End: 2021-08-13

## 2021-08-13 DIAGNOSIS — F32.A DEPRESSION, UNSPECIFIED DEPRESSION TYPE: ICD-10-CM

## 2021-08-13 RX ORDER — VENLAFAXINE HYDROCHLORIDE 150 MG/1
150 CAPSULE, EXTENDED RELEASE ORAL 2 TIMES DAILY
Qty: 60 CAPSULE | Refills: 5 | Status: SHIPPED | OUTPATIENT
Start: 2021-08-13 | End: 2021-09-13

## 2021-08-13 NOTE — TELEPHONE ENCOUNTER
----- Message from Eamon Rosas PA-C sent at 8/10/2021 11:21 AM EDT -----  Regarding: FW: Prescription Question  Contact: [phone]  I dont know anything about this, can we look into it and resend please  Please notify pt of same  Thanks  ----- Message -----  From: Laury Canada  Sent: 8/10/2021  11:18 AM EDT  To: Eamon Rosas PA-C  Subject: FW: Prescription Question                          ----- Message -----  From: Shaina Dixon  Sent: 8/10/2021   9:25 AM EDT  To: [facility]  Subject: Prescription Question                            Daniel Hathaway,    I called my insurance to check if they approved Venlafaxine  and they said my doctor canceled  No one told me anything ,not even a call  Needs to be faxed again and if you can note on the script to jose alberto ALCANTAR  Thank you    Evelyn Casillas

## 2021-08-19 DIAGNOSIS — R63.5 WEIGHT GAIN: ICD-10-CM

## 2021-08-19 RX ORDER — BUPROPION HYDROCHLORIDE 150 MG/1
TABLET ORAL
Qty: 180 TABLET | Refills: 3 | Status: SHIPPED | OUTPATIENT
Start: 2021-08-19 | End: 2021-09-29

## 2021-09-08 ENCOUNTER — TELEPHONE (OUTPATIENT)
Dept: INTERNAL MEDICINE CLINIC | Facility: CLINIC | Age: 56
End: 2021-09-08

## 2021-09-08 NOTE — TELEPHONE ENCOUNTER
----- Message from Byron Chowdhury sent at 9/8/2021 11:16 AM EDT -----  Regarding: RE: Prescription Question  Contact: [phone]  chato Avitia call it in

## 2021-09-13 ENCOUNTER — TELEPHONE (OUTPATIENT)
Dept: INTERNAL MEDICINE CLINIC | Facility: CLINIC | Age: 56
End: 2021-09-13

## 2021-09-13 DIAGNOSIS — F32.A DEPRESSION, UNSPECIFIED DEPRESSION TYPE: Primary | ICD-10-CM

## 2021-09-13 RX ORDER — VENLAFAXINE HYDROCHLORIDE 75 MG/1
75 CAPSULE, EXTENDED RELEASE ORAL
Qty: 90 CAPSULE | Refills: 3 | Status: SHIPPED | OUTPATIENT
Start: 2021-09-13 | End: 2021-09-29

## 2021-09-13 NOTE — TELEPHONE ENCOUNTER
----- Message from Vj Kelley PA-C sent at 9/13/2021  9:05 AM EDT -----  Regarding: FW: Prescription Question  Contact: [phone]  Rx sent to [facility] for effexor 75mg please notify, thanks  ----- Message -----  From: Renae Babinski  Sent: 9/10/2021   1:57 PM EDT  To: Vj Kelley PA-C  Subject: FW: Prescription Question                        Pt would like to go to 75mg of lexapro   ----- Message -----  From: Dashawn Hyde  Sent: 9/10/2021  12:55 PM EDT  To: [facility]  Subject: RE: Prescription Question                        Kate Gonsales  Did you get my message from Sadi  Regarding Effexor?     Aydin Vu

## 2021-09-29 DIAGNOSIS — F32.A DEPRESSION, UNSPECIFIED DEPRESSION TYPE: Primary | ICD-10-CM

## 2021-10-18 ENCOUNTER — PATIENT MESSAGE (OUTPATIENT)
Dept: INTERNAL MEDICINE CLINIC | Facility: CLINIC | Age: 56
End: 2021-10-18

## 2021-10-18 DIAGNOSIS — F41.0 PANIC ATTACKS: ICD-10-CM

## 2021-10-18 DIAGNOSIS — H40.20X2 ANGLE-CLOSURE GLAUCOMA, MODERATE STAGE: Primary | ICD-10-CM

## 2021-10-18 RX ORDER — LORAZEPAM 0.5 MG/1
0.5 TABLET ORAL 2 TIMES DAILY
Qty: 60 TABLET | Refills: 1 | Status: SHIPPED | OUTPATIENT
Start: 2021-10-18 | End: 2022-07-04 | Stop reason: SDUPTHER

## 2021-10-31 DIAGNOSIS — K59.09 CHRONIC CONSTIPATION: ICD-10-CM

## 2021-11-01 DIAGNOSIS — F32.A DEPRESSION, UNSPECIFIED DEPRESSION TYPE: ICD-10-CM

## 2021-11-01 RX ORDER — LUBIPROSTONE 24 UG/1
CAPSULE, GELATIN COATED ORAL
Qty: 60 CAPSULE | Refills: 2 | Status: SHIPPED | OUTPATIENT
Start: 2021-11-01 | End: 2021-12-27 | Stop reason: SDUPTHER

## 2021-11-24 DIAGNOSIS — F32.A DEPRESSION, UNSPECIFIED DEPRESSION TYPE: Primary | ICD-10-CM

## 2021-11-24 RX ORDER — ESCITALOPRAM OXALATE 10 MG/1
10 TABLET ORAL DAILY
Qty: 90 TABLET | Refills: 3 | Status: SHIPPED | OUTPATIENT
Start: 2021-11-24 | End: 2022-01-26 | Stop reason: SDUPTHER

## 2021-12-27 DIAGNOSIS — K59.09 CHRONIC CONSTIPATION: ICD-10-CM

## 2022-01-10 ENCOUNTER — TELEMEDICINE (OUTPATIENT)
Dept: INTERNAL MEDICINE CLINIC | Facility: CLINIC | Age: 57
End: 2022-01-10
Payer: COMMERCIAL

## 2022-01-10 VITALS — BODY MASS INDEX: 18.69 KG/M2 | HEIGHT: 71 IN

## 2022-01-10 DIAGNOSIS — K21.9 GASTROESOPHAGEAL REFLUX DISEASE WITHOUT ESOPHAGITIS: Primary | ICD-10-CM

## 2022-01-10 DIAGNOSIS — R10.13 MIDEPIGASTRIC PAIN: ICD-10-CM

## 2022-01-10 PROCEDURE — 99214 OFFICE O/P EST MOD 30 MIN: CPT | Performed by: PHYSICIAN ASSISTANT

## 2022-01-10 RX ORDER — PANTOPRAZOLE SODIUM 40 MG/1
40 TABLET, DELAYED RELEASE ORAL DAILY
Qty: 30 TABLET | Refills: 2 | Status: SHIPPED | OUTPATIENT
Start: 2022-01-10 | End: 2022-01-26 | Stop reason: SDUPTHER

## 2022-01-10 NOTE — ASSESSMENT & PLAN NOTE
Differential includes GERD, gastric ulcer or gallbladder disease  Start protonix  RUQ ultrasound orderered   Consider HIDA or EGD depending on symptom response and ultrasound results

## 2022-01-10 NOTE — PROGRESS NOTES
Virtual Regular Visit    Verification of patient location:    Patient is located in the following state in which I hold an active license [place]      Assessment/Plan:    Problem List Items Addressed This Visit        Digestive    Gastroesophageal reflux disease without esophagitis - Primary    Relevant Medications    pantoprazole (PROTONIX) 40 mg tablet    Other Relevant Orders    Ambulatory referral to Gastroenterology       Other    Midepigastric pain     Differential includes GERD, gastric ulcer or gallbladder disease  Start protonix  RUQ ultrasound orderered  Consider HIDA or EGD depending on symptom response and ultrasound results         Relevant Orders    US right upper quadrant               Reason for visit is   Chief Complaint   Patient presents with    Follow-up     pt has indigestion for about 6 mths, constant, been taking tums and it most often helps/relieves it    Virtual Regular Visit        Encounter provider Toan Chew PA-C    Provider located at [address] [address]      Recent Visits  No visits were found meeting these conditions  Showing recent visits within past 7 days and meeting all other requirements  Today's Visits  Date Type Provider Dept   01/10/22 Telemedicine ISREAL Naik Pg today's visits and meeting all other requirements  Future Appointments  No visits were found meeting these conditions  Showing future appointments within next 150 days and meeting all other requirements       The patient was identified by name and date of birth  Theo Starks was informed that this is a telemedicine visit and that the visit is being conducted through [address] and patient was informed this is a secure, HIPAA-complaint platform  She agrees to proceed     My office door was closed  No one else was in the room    She acknowledged consent and understanding of privacy and security of the video platform  The patient has agreed to participate and understands they can discontinue the visit at any time  Patient is aware this is a billable service  Pauly Khalil is a 55 y o  female    Abdominal Pain  This is a chronic problem  The current episode started more than 1 month ago  The problem occurs daily  The most recent episode lasted 6 months  The problem has been unchanged  The pain is located in the epigastric region  The pain is at a severity of 7/10  The pain is moderate  The quality of the pain is aching  The abdominal pain does not radiate  Associated symptoms include nausea  Pertinent negatives include no anorexia, arthralgias, belching, constipation, diarrhea, dysuria, fever, headaches, myalgias or vomiting  The pain is aggravated by certain positions and eating  The pain is relieved by nothing  She has tried antacids for the symptoms  The treatment provided mild relief  Past Medical History:   Diagnosis Date    Cervical cancer ([address] )     Depression     GERD (gastroesophageal reflux disease)     Migraine        Past Surgical History:   Procedure Laterality Date    HYSTERECTOMY      LAPAROSCOPIC SMALL BOWEL RESECTION      WRIST SURGERY Right        Current Outpatient Medications   Medication Sig Dispense Refill    Biotin 1 MG CAPS Take 1 capsule (1 mg total) by mouth daily      cholecalciferol (VITAMIN D3) 1,000 units tablet Take 1 tablet (1,000 Units total) by mouth daily      EMGALITY 120 MG/ML SOSY INJECT 2 SYRINGES (240MG) AS A LOADING DOSE MONTH ONE  THEN 1 S   (REFER TO PRESCRIPTION NOTES)   escitalopram (Lexapro) 10 mg tablet Take 1 tablet (10 mg total) by mouth daily 90 tablet 3    ketorolac (TORADOL) 10 mg tablet take 1 tablet by mouth every 6 hours if needed for MODERATE PAIN      (REFER TO PRESCRIPTION NOTES)        ketorolac (TORADOL) 30 mg/mL injection ketorolac 30 mg/mL (1 mL) injection solution      LORazepam (Ativan) 0 5 mg tablet Take 1 tablet (0 5 mg total) by mouth 2 (two) times a day 60 tablet 1    lubiprostone (Amitiza) 24 mcg capsule Take 1 capsule (24 mcg total) by mouth 2 (two) times a day with meals 60 capsule 2    rosuvastatin (CRESTOR) 10 MG tablet TAKE 1 TABLET DAILY 90 tablet 3    pantoprazole (PROTONIX) 40 mg tablet Take 1 tablet (40 mg total) by mouth daily 30 tablet 2     Current Facility-Administered Medications   Medication Dose Route Frequency Provider Last Rate Last Admin    ondansetron (ZOFRAN-ODT) dispersible tablet 4 mg  4 mg Oral Q6H PRN RUPINDER Esquivel   4 mg at 11/17/18 1717        No Known Allergies    Review of Systems   Constitutional: Negative for chills and fever  HENT: Negative for congestion, ear pain, hearing loss, postnasal drip, rhinorrhea, sinus pressure, sinus pain, sore throat and trouble swallowing  Eyes: Negative for pain and visual disturbance  Respiratory: Negative for cough, chest tightness, shortness of breath and wheezing  Cardiovascular: Negative  Negative for chest pain, palpitations and leg swelling  Gastrointestinal: Positive for abdominal pain and nausea  Negative for anorexia, blood in stool, constipation, diarrhea and vomiting  Endocrine: Negative for cold intolerance, heat intolerance, polydipsia, polyphagia and polyuria  Genitourinary: Negative for difficulty urinating, dysuria, flank pain and urgency  Musculoskeletal: Negative for arthralgias, back pain, gait problem and myalgias  Skin: Negative for rash  Allergic/Immunologic: Negative  Neurological: Negative for dizziness, weakness, light-headedness and headaches  Hematological: Negative  Psychiatric/Behavioral: Negative for behavioral problems, dysphoric mood and sleep disturbance  The patient is not nervous/anxious  Video Exam    Vitals:    01/10/22 1416   Height: 5' 11" (1 803 m)       Physical Exam  Nursing note reviewed  Constitutional:       Appearance: She is well-developed  HENT:      Head: Normocephalic and atraumatic  Musculoskeletal:         General: Normal range of motion  Cervical back: Normal range of motion  Neurological:      Mental Status: She is alert  Psychiatric:         Behavior: Behavior normal          Thought Content: Thought content normal          Judgment: Judgment normal           I spent 20 minutes directly with the patient during this visit    [id number] [facility] verbally agrees to participate in [employer]  Pt is aware that [employer] could be limited without vital signs or the ability to perform a full hands-on physical exam  Tiffani Lancaster understands she or the provider may request at any time to terminate the video visit and request the patient to seek care or treatment in person

## 2022-01-11 ENCOUNTER — APPOINTMENT (EMERGENCY)
Dept: ULTRASOUND IMAGING | Facility: HOSPITAL | Age: 57
End: 2022-01-11
Payer: COMMERCIAL

## 2022-01-11 ENCOUNTER — APPOINTMENT (EMERGENCY)
Dept: CT IMAGING | Facility: HOSPITAL | Age: 57
End: 2022-01-11
Payer: COMMERCIAL

## 2022-01-11 ENCOUNTER — HOSPITAL ENCOUNTER (EMERGENCY)
Facility: HOSPITAL | Age: 57
Discharge: HOME | End: 2022-01-11
Attending: EMERGENCY MEDICINE
Payer: COMMERCIAL

## 2022-01-11 VITALS
WEIGHT: 138 LBS | DIASTOLIC BLOOD PRESSURE: 57 MMHG | RESPIRATION RATE: 16 BRPM | SYSTOLIC BLOOD PRESSURE: 118 MMHG | HEART RATE: 71 BPM | BODY MASS INDEX: 19.32 KG/M2 | TEMPERATURE: 98.6 F | HEIGHT: 71 IN | OXYGEN SATURATION: 100 %

## 2022-01-11 DIAGNOSIS — R10.9 ABDOMINAL PAIN, UNSPECIFIED ABDOMINAL LOCATION: Primary | ICD-10-CM

## 2022-01-11 LAB
ALBUMIN SERPL BCP-MCNC: 4.5 G/DL (ref 3.5–5)
ALP SERPL-CCNC: 107 U/L (ref 34–104)
ALT SERPL W P-5'-P-CCNC: 54 U/L (ref 7–52)
ANION GAP SERPL CALCULATED.3IONS-SCNC: 6 MMOL/L (ref 4–13)
AST SERPL W P-5'-P-CCNC: 35 U/L (ref 13–39)
BASOPHILS # BLD AUTO: 0.05 THOUSANDS/ΜL (ref 0–0.1)
BASOPHILS NFR BLD AUTO: 1 % (ref 0–1)
BILIRUB SERPL-MCNC: 0.31 MG/DL (ref 0.2–1)
BUN SERPL-MCNC: 17 MG/DL (ref 5–25)
CALCIUM SERPL-MCNC: 9.8 MG/DL (ref 8.4–10.2)
CHLORIDE SERPL-SCNC: 101 MMOL/L (ref 96–108)
CO2 SERPL-SCNC: 31 MMOL/L (ref 21–32)
CREAT SERPL-MCNC: 0.77 MG/DL (ref 0.6–1.3)
EOSINOPHIL # BLD AUTO: 0.72 THOUSAND/ΜL (ref 0–0.61)
EOSINOPHIL NFR BLD AUTO: 10 % (ref 0–6)
ERYTHROCYTE [DISTWIDTH] IN BLOOD BY AUTOMATED COUNT: 12.4 % (ref 11.6–15.1)
GFR SERPL CREATININE-BSD FRML MDRD: 87 ML/MIN/1.73SQ M
GLUCOSE SERPL-MCNC: 96 MG/DL (ref 65–140)
HCT VFR BLD AUTO: 36.9 % (ref 34.8–46.1)
HGB BLD-MCNC: 11.9 G/DL (ref 11.5–15.4)
IMM GRANULOCYTES # BLD AUTO: 0.02 THOUSAND/UL (ref 0–0.2)
IMM GRANULOCYTES NFR BLD AUTO: 0 % (ref 0–2)
LIPASE SERPL-CCNC: 136 U/L (ref 11–82)
LYMPHOCYTES # BLD AUTO: 2.61 THOUSANDS/ΜL (ref 0.6–4.47)
LYMPHOCYTES NFR BLD AUTO: 37 % (ref 14–44)
MCH RBC QN AUTO: 30 PG (ref 26.8–34.3)
MCHC RBC AUTO-ENTMCNC: 32.2 G/DL (ref 31.4–37.4)
MCV RBC AUTO: 93 FL (ref 82–98)
MONOCYTES # BLD AUTO: 0.46 THOUSAND/ΜL (ref 0.17–1.22)
MONOCYTES NFR BLD AUTO: 7 % (ref 4–12)
NEUTROPHILS # BLD AUTO: 3.14 THOUSANDS/ΜL (ref 1.85–7.62)
NEUTS SEG NFR BLD AUTO: 45 % (ref 43–75)
NRBC BLD AUTO-RTO: 0 /100 WBCS
PLATELET # BLD AUTO: 278 THOUSANDS/UL (ref 149–390)
PMV BLD AUTO: 9 FL (ref 8.9–12.7)
POTASSIUM SERPL-SCNC: 3.6 MMOL/L (ref 3.5–5.3)
PROT SERPL-MCNC: 7.3 G/DL (ref 6.4–8.4)
RBC # BLD AUTO: 3.97 MILLION/UL (ref 3.81–5.12)
SODIUM SERPL-SCNC: 138 MMOL/L (ref 135–147)
WBC # BLD AUTO: 7 THOUSAND/UL (ref 4.31–10.16)

## 2022-01-11 PROCEDURE — G1004 CDSM NDSC: HCPCS

## 2022-01-11 PROCEDURE — 99284 EMERGENCY DEPT VISIT MOD MDM: CPT | Performed by: EMERGENCY MEDICINE

## 2022-01-11 PROCEDURE — 85025 COMPLETE CBC W/AUTO DIFF WBC: CPT | Performed by: EMERGENCY MEDICINE

## 2022-01-11 PROCEDURE — 36415 COLL VENOUS BLD VENIPUNCTURE: CPT

## 2022-01-11 PROCEDURE — 83690 ASSAY OF LIPASE: CPT | Performed by: EMERGENCY MEDICINE

## 2022-01-11 PROCEDURE — 74177 CT ABD & PELVIS W/CONTRAST: CPT

## 2022-01-11 PROCEDURE — 99284 EMERGENCY DEPT VISIT MOD MDM: CPT

## 2022-01-11 PROCEDURE — 80053 COMPREHEN METABOLIC PANEL: CPT | Performed by: EMERGENCY MEDICINE

## 2022-01-11 PROCEDURE — 76700 US EXAM ABDOM COMPLETE: CPT

## 2022-01-11 RX ORDER — ONDANSETRON 4 MG/1
4 TABLET, ORALLY DISINTEGRATING ORAL ONCE
Status: COMPLETED | OUTPATIENT
Start: 2022-01-11 | End: 2022-01-11

## 2022-01-11 RX ORDER — ONDANSETRON 4 MG/1
4 TABLET, FILM COATED ORAL EVERY 6 HOURS
Qty: 12 TABLET | Refills: 0 | Status: SHIPPED | OUTPATIENT
Start: 2022-01-11 | End: 2022-04-08

## 2022-01-11 RX ORDER — LIDOCAINE HYDROCHLORIDE 20 MG/ML
15 SOLUTION OROPHARYNGEAL 4 TIMES DAILY PRN
Qty: 100 ML | Refills: 0 | Status: SHIPPED | OUTPATIENT
Start: 2022-01-11 | End: 2022-02-02 | Stop reason: HOSPADM

## 2022-01-11 RX ORDER — LIDOCAINE HYDROCHLORIDE 20 MG/ML
15 SOLUTION OROPHARYNGEAL ONCE
Status: COMPLETED | OUTPATIENT
Start: 2022-01-11 | End: 2022-01-11

## 2022-01-11 RX ORDER — MAGNESIUM HYDROXIDE/ALUMINUM HYDROXICE/SIMETHICONE 120; 1200; 1200 MG/30ML; MG/30ML; MG/30ML
30 SUSPENSION ORAL ONCE
Status: COMPLETED | OUTPATIENT
Start: 2022-01-11 | End: 2022-01-11

## 2022-01-11 RX ADMIN — ALUMINUM HYDROXIDE, MAGNESIUM HYDROXIDE, AND SIMETHICONE 30 ML: 200; 200; 20 SUSPENSION ORAL at 21:14

## 2022-01-11 RX ADMIN — ONDANSETRON 4 MG: 4 TABLET, ORALLY DISINTEGRATING ORAL at 21:14

## 2022-01-11 RX ADMIN — IOHEXOL 100 ML: 350 INJECTION, SOLUTION INTRAVENOUS at 20:24

## 2022-01-11 RX ADMIN — LIDOCAINE HYDROCHLORIDE 15 ML: 20 SOLUTION OROPHARYNGEAL at 21:14

## 2022-01-12 NOTE — ED PROVIDER NOTES
History  Chief Complaint   Patient presents with    Abdominal Pain     pain under right breast area  patient seen pcp for this issue and has an ultrasound scheduled  pain is owrse after eating     This is a 30-year-old female complains of epigastric/right upper quadrant pain  Worse after eating  Present for 6 months with significantly worse tonight  Seen by PCP who started her on Protonix gave her referral to Gastroenterology as well as an ultrasound of the right upper quadrant which is to be done on Monday  Patient rates pain today severe and associated with nausea  Some mild improvement of symptoms with Tums  Some intermittent constipation however that is chronic  Urination has been normal   Patient reports she had an episode of bowel obstruction years ago but that felt different than this  Prior to Admission Medications   Prescriptions Last Dose Informant Patient Reported? Taking? Biotin 1 MG CAPS   No No   Sig: Take 1 capsule (1 mg total) by mouth daily   EMGALITY 120 MG/ML SOSY   Yes No   Sig: INJECT 2 SYRINGES (240MG) AS A LOADING DOSE MONTH ONE  THEN 1 S   (REFER TO PRESCRIPTION NOTES)  LORazepam (Ativan) 0 5 mg tablet   No No   Sig: Take 1 tablet (0 5 mg total) by mouth 2 (two) times a day   cholecalciferol (VITAMIN D3) 1,000 units tablet   No No   Sig: Take 1 tablet (1,000 Units total) by mouth daily   escitalopram (Lexapro) 10 mg tablet   No No   Sig: Take 1 tablet (10 mg total) by mouth daily   ketorolac (TORADOL) 10 mg tablet   Yes No   Sig: take 1 tablet by mouth every 6 hours if needed for MODERATE PAIN      (REFER TO PRESCRIPTION NOTES)     ketorolac (TORADOL) 30 mg/mL injection   Yes No   Sig: ketorolac 30 mg/mL (1 mL) injection solution   lubiprostone (Amitiza) 24 mcg capsule   No No   Sig: Take 1 capsule (24 mcg total) by mouth 2 (two) times a day with meals   pantoprazole (PROTONIX) 40 mg tablet   No No   Sig: Take 1 tablet (40 mg total) by mouth daily   rosuvastatin (CRESTOR) 10 MG tablet   No No   Sig: TAKE 1 TABLET DAILY      Facility-Administered Medications Last Administration Doses Remaining   ondansetron (ZOFRAN-ODT) dispersible tablet 4 mg 11/17/2018  5:17 PM           Past Medical History:   Diagnosis Date    Cervical cancer ([address] )     Depression     GERD (gastroesophageal reflux disease)     Migraine        Past Surgical History:   Procedure Laterality Date    HYSTERECTOMY      LAPAROSCOPIC SMALL BOWEL RESECTION      WRIST SURGERY Right        Family History   Problem Relation Age of Onset    No Known Problems Mother     No Known Problems Father      I have reviewed and agree with the history as documented  E-Cigarette/Vaping    E-Cigarette Use Never User      E-Cigarette/Vaping Substances     Social History     Tobacco Use    Smoking status: Never Smoker    Smokeless tobacco: Never Used   Vaping Use    Vaping Use: Never used   Substance Use Topics    Alcohol use: Yes     Comment: socially    Drug use: No       Review of Systems   Constitutional: Negative for activity change, chills, fatigue and fever  HENT: Negative for congestion  Eyes: Negative for visual disturbance  Respiratory: Negative for cough, chest tightness and shortness of breath  Cardiovascular: Negative for chest pain  Gastrointestinal: Positive for abdominal pain and nausea  Negative for abdominal distention, diarrhea and vomiting  Genitourinary: Negative for dysuria  Skin: Negative for rash  Neurological: Negative for dizziness, weakness and numbness  Physical Exam  Physical Exam  Constitutional:       General: She is not in acute distress  Appearance: Normal appearance  She is not ill-appearing, toxic-appearing or diaphoretic  HENT:      Head: Normocephalic and atraumatic  Right Ear: External ear normal       Left Ear: External ear normal    Eyes:      Extraocular Movements: Extraocular movements intact        Conjunctiva/sclera: Conjunctivae normal  Pupils: Pupils are equal, round, and reactive to light  Cardiovascular:      Comments: Good peripheral perfusion, good coloration  Pulmonary:      Effort: Pulmonary effort is normal    Abdominal:      General: There is no distension  Palpations: Abdomen is soft  Tenderness: There is abdominal tenderness in the epigastric area  There is no right CVA tenderness, left CVA tenderness, guarding or rebound  Musculoskeletal:         General: No swelling, deformity or signs of injury  Normal range of motion  Cervical back: Normal range of motion and neck supple  Skin:     General: Skin is warm  Findings: No bruising, lesion or rash  Neurological:      General: No focal deficit present  Mental Status: She is alert and oriented to person, place, and time  Gait: Gait normal    Psychiatric:         Mood and Affect: Mood normal          Behavior: Behavior normal          Thought Content:  Thought content normal          Judgment: Judgment normal          Vital Signs  ED Triage Vitals   Temperature Pulse Respirations Blood Pressure SpO2   01/11/22 1931 01/11/22 1928 01/11/22 1928 01/11/22 1928 01/11/22 1928   98 6 °F (37 °C) 71 16 118/57 100 %      Temp Source Heart Rate Source Patient Position - Orthostatic VS BP Location FiO2 (%)   01/11/22 1931 01/11/22 1928 01/11/22 1928 01/11/22 1928 --   Oral Monitor Sitting Left arm       Pain Score       01/11/22 1928       8           Vitals:    01/11/22 1928   BP: 118/57   Pulse: 71   Patient Position - Orthostatic VS: Sitting         Visual Acuity      ED Medications  Medications   iohexol (OMNIPAQUE) 350 MG/ML injection (SINGLE-DOSE) 100 mL (100 mL Intravenous Given 1/11/22 2024)   Lidocaine Viscous HCl (XYLOCAINE) 2 % mucosal solution 15 mL (15 mL Swish & Swallow Given 1/11/22 2114)   aluminum-magnesium hydroxide-simethicone (MYLANTA) oral suspension 30 mL (30 mL Oral Given 1/11/22 2114)   ondansetron (ZOFRAN-ODT) dispersible tablet 4 mg (4 mg Oral Given 1/11/22 2114)       Diagnostic Studies  Results Reviewed     Procedure Component Value Units Date/Time    Lipase [[id number]]  (Abnormal) Collected: 01/11/22 1945    Lab Status: Final result Specimen: Blood from Arm, Left Updated: 01/11/22 2018     Lipase 136 u/L     Comprehensive metabolic panel [[id number]]  (Abnormal) Collected: 01/11/22 1945    Lab Status: Final result Specimen: Blood from Arm, Left Updated: 01/11/22 2018     Sodium 138 mmol/L      Potassium 3 6 mmol/L      Chloride 101 mmol/L      CO2 31 mmol/L      ANION GAP 6 mmol/L      BUN 17 mg/dL      Creatinine 0 77 mg/dL      Glucose 96 mg/dL      Calcium 9 8 mg/dL      AST 35 U/L      ALT 54 U/L      Alkaline Phosphatase 107 U/L      Total Protein 7 3 g/dL      Albumin 4 5 g/dL      Total Bilirubin 0 31 mg/dL      eGFR 87 ml/min/1 73sq m     Narrative:      [place] guidelines for Chronic Kidney Disease (CKD):     Stage 1 with normal or high GFR (GFR > 90 mL/min/1 73 square meters)    Stage 2 Mild CKD (GFR = 60-89 mL/min/1 73 square meters)    Stage 3A Moderate CKD (GFR = 45-59 mL/min/1 73 square meters)    Stage 3B Moderate CKD (GFR = 30-44 mL/min/1 73 square meters)    Stage 4 Severe CKD (GFR = 15-29 mL/min/1 73 square meters)    Stage 5 End Stage CKD (GFR <15 mL/min/1 73 square meters)  Note: GFR calculation is accurate only with a steady state creatinine    CBC and differential [[id number]]  (Abnormal) Collected: 01/11/22 1945    Lab Status: Final result Specimen: Blood from Arm, Left Updated: 01/11/22 1952     WBC 7 00 Thousand/uL      RBC 3 97 Million/uL      Hemoglobin 11 9 g/dL      Hematocrit 36 9 %      MCV 93 fL      MCH 30 0 pg      MCHC 32 2 g/dL      RDW 12 4 %      MPV 9 0 fL      Platelets 271 Thousands/uL      nRBC 0 /100 WBCs      Neutrophils Relative 45 %      Immat GRANS % 0 %      Lymphocytes Relative 37 %      Monocytes Relative 7 %      Eosinophils Relative 10 %      Basophils Relative 1 %      Neutrophils Absolute 3 14 Thousands/µL      Immature Grans Absolute 0 02 Thousand/uL      Lymphocytes Absolute 2 61 Thousands/µL      Monocytes Absolute 0 46 Thousand/µL      Eosinophils Absolute 0 72 Thousand/µL      Basophils Absolute 0 05 Thousands/µL     UA w Reflex to Microscopic w Reflex to Culture [[id number]]     Lab Status: No result Specimen: Urine                  US abdomen complete   Final Result by Deion Priest MD (01/11 2035)      Normal sonographic appearance of the abdomen  Workstation performed: [id number]         CT Abdomen pelvis with contrast   Final Result by Jn Hernandez MD (01/11 2052)      No evidence of bowel obstruction, colitis, diverticulitis or appendicitis  Workstation performed: [id number]                    Procedures  Procedures         ED Course                               SBIRT 22yo+      Most Recent Value   SBIRT (24 yo +)    In order to provide better care to our patients, we are screening all of our patients for alcohol and drug use  Would it be okay to ask you these screening questions? No Filed at: 01/11/2022 195                    MDM  Number of Diagnoses or Management Options  Abdominal pain, unspecified abdominal location: new and requires workup  Diagnosis management comments: This is a 52-year-old female with abdominal pain  Responsive to lidocaine and Maalox with complete resolution of symptoms  Ultrasound and CT demonstrated no acute findings  Discussed borderline lipase and alk-phos  Patient follow-up with GI in the outpatient setting soon  Discussed warning signs and symptoms with the patient as well as when to return to the emergency department versus follow up with PC P  Patient states understanding and agreement with the plan  Patient care delayed due to critical capacity in the emergency department  This note was completed using dictation software            Amount and/or Complexity of Data Reviewed  Clinical lab tests: ordered and reviewed  Tests in the radiology section of CPT®: ordered and reviewed  Review and summarize past medical records: yes        Disposition  Final diagnoses:   Abdominal pain, unspecified abdominal location     Time reflects when diagnosis was documented in both MDM as applicable and the Disposition within this note     Time User Action Codes Description Comment    1/11/2022  9:16 PM Danna Scripture Add [R10 9] Abdominal pain, unspecified abdominal location       ED Disposition     ED Disposition Condition Date/Time Comment    Discharge Stable Tue Jan 11, 2022  9:16 PM Jerry Lancaster discharge to home/self care  Follow-up Information     Follow up With Specialties Details Why Contact Info    Ardia Ormond, PA-C Family Medicine, Internal Medicine, Physician Assistant In 1 day  [address] [facility] [place]  [phone]            Discharge Medication List as of 1/11/2022  9:17 PM      START taking these medications    Details   ondansetron (ZOFRAN) 4 mg tablet Take 1 tablet (4 mg total) by mouth every 6 (six) hours, Starting Tue 1/11/2022, Normal         CONTINUE these medications which have NOT CHANGED    Details   Biotin 1 MG CAPS Take 1 capsule (1 mg total) by mouth daily, Starting Wed 6/2/2021, No Print      cholecalciferol (VITAMIN D3) 1,000 units tablet Take 1 tablet (1,000 Units total) by mouth daily, Starting Wed 6/2/2021, No Print      EMGALITY 120 MG/ML SOSY INJECT 2 SYRINGES (240MG) AS A LOADING DOSE MONTH ONE  THEN 1 S   (REFER TO PRESCRIPTION NOTES)  , Historical Med      escitalopram (Lexapro) 10 mg tablet Take 1 tablet (10 mg total) by mouth daily, Starting Wed 11/24/2021, Normal      ketorolac (TORADOL) 10 mg tablet take 1 tablet by mouth every 6 hours if needed for MODERATE PAIN      (REFER TO PRESCRIPTION NOTES)  , Historical Med      ketorolac (TORADOL) 30 mg/mL injection ketorolac 30 mg/mL (1 mL) injection solution, Historical Med      LORazepam (Ativan) 0 5 mg tablet Take 1 tablet (0 5 mg total) by mouth 2 (two) times a day, Starting Mon 10/18/2021, Normal      lubiprostone (Amitiza) 24 mcg capsule Take 1 capsule (24 mcg total) by mouth 2 (two) times a day with meals, Starting Mon 12/27/2021, Normal      pantoprazole (PROTONIX) 40 mg tablet Take 1 tablet (40 mg total) by mouth daily, Starting Mon 1/10/2022, Normal      rosuvastatin (CRESTOR) 10 MG tablet TAKE 1 TABLET DAILY, Normal             No discharge procedures on file      PDMP Review     None          ED Provider  Electronically Signed by           Edilson Medina MD  01/11/22 3635

## 2022-01-14 ENCOUNTER — CONSULT (OUTPATIENT)
Dept: GASTROENTEROLOGY | Facility: CLINIC | Age: 57
End: 2022-01-14
Payer: COMMERCIAL

## 2022-01-14 ENCOUNTER — APPOINTMENT (OUTPATIENT)
Dept: LAB | Facility: CLINIC | Age: 57
End: 2022-01-14
Payer: COMMERCIAL

## 2022-01-14 VITALS
HEIGHT: 71 IN | SYSTOLIC BLOOD PRESSURE: 106 MMHG | WEIGHT: 140 LBS | DIASTOLIC BLOOD PRESSURE: 68 MMHG | BODY MASS INDEX: 19.6 KG/M2 | TEMPERATURE: 98.7 F | HEART RATE: 85 BPM | OXYGEN SATURATION: 96 % | RESPIRATION RATE: 20 BRPM

## 2022-01-14 DIAGNOSIS — K21.9 GASTROESOPHAGEAL REFLUX DISEASE WITHOUT ESOPHAGITIS: Primary | ICD-10-CM

## 2022-01-14 DIAGNOSIS — R79.89 ABNORMAL LFTS: ICD-10-CM

## 2022-01-14 DIAGNOSIS — Z86.010 HISTORY OF COLON POLYPS: ICD-10-CM

## 2022-01-14 LAB
FERRITIN SERPL-MCNC: 34 NG/ML (ref 8–388)
HAV AB SER QL IA: REACTIVE
HBV CORE AB SER QL: NORMAL
HBV SURFACE AB SER-ACNC: <3.1 MIU/ML
HBV SURFACE AG SER QL: NORMAL
HCV AB SER QL: NORMAL
IGA SERPL-MCNC: 129 MG/DL (ref 70–400)
IGG SERPL-MCNC: 881 MG/DL (ref 700–1600)
IGM SERPL-MCNC: 151 MG/DL (ref 40–230)
IRON SATN MFR SERPL: 15 % (ref 15–50)
IRON SERPL-MCNC: 64 UG/DL (ref 50–170)
TIBC SERPL-MCNC: 422 UG/DL (ref 250–450)
TSH SERPL DL<=0.05 MIU/L-ACNC: 0.6 UIU/ML (ref 0.36–3.74)

## 2022-01-14 PROCEDURE — 86708 HEPATITIS A ANTIBODY: CPT

## 2022-01-14 PROCEDURE — 82103 ALPHA-1-ANTITRYPSIN TOTAL: CPT

## 2022-01-14 PROCEDURE — 1036F TOBACCO NON-USER: CPT | Performed by: STUDENT IN AN ORGANIZED HEALTH CARE EDUCATION/TRAINING PROGRAM

## 2022-01-14 PROCEDURE — 83550 IRON BINDING TEST: CPT

## 2022-01-14 PROCEDURE — 82390 ASSAY OF CERULOPLASMIN: CPT

## 2022-01-14 PROCEDURE — 99204 OFFICE O/P NEW MOD 45 MIN: CPT | Performed by: STUDENT IN AN ORGANIZED HEALTH CARE EDUCATION/TRAINING PROGRAM

## 2022-01-14 PROCEDURE — 84443 ASSAY THYROID STIM HORMONE: CPT

## 2022-01-14 PROCEDURE — 86706 HEP B SURFACE ANTIBODY: CPT

## 2022-01-14 PROCEDURE — 86803 HEPATITIS C AB TEST: CPT

## 2022-01-14 PROCEDURE — 82784 ASSAY IGA/IGD/IGG/IGM EACH: CPT

## 2022-01-14 PROCEDURE — 87340 HEPATITIS B SURFACE AG IA: CPT

## 2022-01-14 PROCEDURE — 86015 ACTIN ANTIBODY EACH: CPT

## 2022-01-14 PROCEDURE — 36415 COLL VENOUS BLD VENIPUNCTURE: CPT

## 2022-01-14 PROCEDURE — 86704 HEP B CORE ANTIBODY TOTAL: CPT

## 2022-01-14 PROCEDURE — 82728 ASSAY OF FERRITIN: CPT

## 2022-01-14 PROCEDURE — 86381 MITOCHONDRIAL ANTIBODY EACH: CPT

## 2022-01-14 PROCEDURE — 83540 ASSAY OF IRON: CPT

## 2022-01-14 PROCEDURE — 86038 ANTINUCLEAR ANTIBODIES: CPT

## 2022-01-14 PROCEDURE — 3008F BODY MASS INDEX DOCD: CPT | Performed by: STUDENT IN AN ORGANIZED HEALTH CARE EDUCATION/TRAINING PROGRAM

## 2022-01-14 RX ORDER — FAMOTIDINE 20 MG/1
20 TABLET, FILM COATED ORAL 2 TIMES DAILY PRN
Qty: 60 TABLET | Refills: 11 | Status: SHIPPED | OUTPATIENT
Start: 2022-01-14 | End: 2022-02-02

## 2022-01-14 NOTE — PATIENT INSTRUCTIONS
Scheduled date of EGD(as of today):2/2/22  Physician performing EGD:Lizzy  Location of EGD:[facility]  Instructions reviewed with patient by: Ame  Clearances: none

## 2022-01-14 NOTE — PROGRESS NOTES
[facility] - Outpatient Consultation  Alban Mello 55 y o  female MRN: [id number]  Encounter: [id number]          ASSESSMENT AND PLAN:    56F with history of cervical cancer, migraines, depression and chronic constipation referred for acute on chronic epigastric pain  Pain sounds most consistent with GERD  No evidence of gallstones/sludge on imaging  Mildly elevated lipase without pancreatic edema during episode this week making pancreatitis less likely  Also has persistently elevated ALT in absence of steatosis or alcohol use  Will assess for chronic liver disease  1  Gastroesophageal reflux disease without esophagitis  - Ambulatory referral to Gastroenterology  - famotidine (PEPCID) 20 mg tablet; Take 1 tablet (20 mg total) by mouth 2 (two) times a day as needed for heartburn  Dispense: 60 tablet; Refill: 11  - EGD; Future  - Patient was counseled on dietary modifications for GERD    2  Abnormal LFTs  - Alpha-1-antitrypsin; Future  - MIMA Screen w/ Reflex to Titer/Pattern; Future  - Antimitochondrial antibody; Future  - Anti-smooth muscle antibody, IgG; Future  - Ceruloplasmin; Future  - Ferritin; Future  - Transferrin Saturation; Future  - TSH, 3rd generation; Future  - Hepatitis A antibody, total; Future  - Hepatitis B surface antibody; Future  - Hepatitis B surface antigen; Future  - Hepatitis B core antibody, total; Future  - Hepatitis C antibody; Future  - Immunoglobulins; Future    3  History of colon polyps  Colonoscopy 12/3/21 with 2 small tubular adenomas, repeat 2028      ______________________________________________________________________    HPI:    Has always had reflux  Usually felt like epigastric ache, no acid regurgitation  For past 6 months, pain has gotten more frequent  Starts after eating, usually pretty quickly  Lasts maybe 30 minutes  Has been using tums, sometimes it helps  Started protonix this week which has helped a little bit but still feels it  Uses toradol every other week, rare ibuprofen  In 2016, had bowel obstruction 2/2 adhesions  In agust that year reports had lipase 600 with abdominal pain  40# weight loss over 6-8 months that she attributes to Wellbutrin  Chronic constipation, takes amitiza    No blood in stool  No alcohol    Went to ED 1/11    REVIEW OF SYSTEMS:    CONSTITUTIONAL: Denies any fever, chills, rigors, and weight loss  HEENT: No earache or tinnitus  Denies hearing loss or visual disturbances  CARDIOVASCULAR: No chest pain or palpitations  RESPIRATORY: Denies any cough, hemoptysis, shortness of breath or dyspnea on exertion  GASTROINTESTINAL: As noted in the History of Present Illness  GENITOURINARY: No problems with urination  Denies any hematuria or dysuria  NEUROLOGIC: No dizziness or vertigo, denies headaches  MUSCULOSKELETAL: Denies any muscle or joint pain  SKIN: Denies skin rashes or itching  ENDOCRINE: Denies excessive thirst  Denies intolerance to heat or cold  PSYCHOSOCIAL: Denies depression or anxiety  Denies any recent memory loss         Historical Information   Past Medical History:   Diagnosis Date    [facility] [address] [address]     Depression     GERD (gastroesophageal reflux disease)     Migraine      Past Surgical History:   Procedure Laterality Date    HYSTERECTOMY      LAPAROSCOPIC SMALL BOWEL RESECTION      WRIST SURGERY Right      Social History   Social History     Substance and Sexual Activity   Alcohol Use Yes    Comment: socially     Social History     Substance and Sexual Activity   Drug Use No     Social History     Tobacco Use   Smoking Status Never Smoker   Smokeless Tobacco Never Used     Family History   Problem Relation Age of Onset    No Known Problems Mother     No Known Problems Father        Meds/Allergies       Current Outpatient Medications:     Biotin 1 MG CAPS    cholecalciferol (VITAMIN D3) 1,000 units tablet    EMGALITY 120 MG/ML SOSY    escitalopram (Lexapro) 10 mg tablet    ketorolac (TORADOL) 10 mg tablet    ketorolac (TORADOL) 30 mg/mL injection    LORazepam (Ativan) 0 5 mg tablet    lubiprostone (Amitiza) 24 mcg capsule    ondansetron (ZOFRAN) 4 mg tablet    pantoprazole (PROTONIX) 40 mg tablet    rosuvastatin (CRESTOR) 10 MG tablet    Lidocaine Viscous HCl (XYLOCAINE) 2 % mucosal solution    Current Facility-Administered Medications:     ondansetron (ZOFRAN-ODT) dispersible tablet 4 mg, 4 mg, Oral, Q6H PRN, 4 mg at 11/17/18 1717    No Known Allergies        Objective     Blood pressure 106/68, pulse 85, temperature 98 7 °F (37 1 °C), resp  rate 20, height 5' 11" (1 803 m), weight 63 5 kg (140 lb), SpO2 96 %  Body mass index is 19 53 kg/m²  PHYSICAL EXAM:      General Appearance:   Alert, cooperative, no distress   HEENT:   Normocephalic, atraumatic, anicteric  Neck:  Supple, symmetrical, trachea midline   Lungs:   Clear to auscultation bilaterally; no rales, rhonchi or wheezing; respirations unlabored    Heart[de-identified]   Regular rate and rhythm; no murmur, rub, or gallop  Abdomen:   Soft, non-tender, non-distended; normal bowel sounds; no masses, no organomegaly    Genitalia:   Deferred    Rectal:   Deferred    Extremities:  No cyanosis, clubbing or edema    Pulses:  2+ and symmetric    Skin:  No jaundice, rashes, or lesions    Lymph nodes:  No palpable cervical lymphadenopathy        Lab Results:   No visits with results within 1 Day(s) from this visit     Latest known visit with results is:   Admission on 01/11/2022, Discharged on 01/11/2022   Component Date Value    WBC 01/11/2022 7 00     RBC 01/11/2022 3 97     Hemoglobin 01/11/2022 11 9     Hematocrit 01/11/2022 36 9     MCV 01/11/2022 93     MCH 01/11/2022 30 0     MCHC 01/11/2022 32 2     RDW 01/11/2022 12 4     MPV 01/11/2022 9 0     Platelets 66/74/9227 278     nRBC 01/11/2022 0     Neutrophils Relative 01/11/2022 45     Immat GRANS % 01/11/2022 0     Lymphocytes Relative 01/11/2022 37     Monocytes Relative 01/11/2022 7     Eosinophils Relative 01/11/2022 10*    Basophils Relative 01/11/2022 1     Neutrophils Absolute 01/11/2022 3 14     Immature Grans Absolute 01/11/2022 0 02     Lymphocytes Absolute 01/11/2022 2 61     Monocytes Absolute 01/11/2022 0 46     Eosinophils Absolute 01/11/2022 0 72*    Basophils Absolute 01/11/2022 0 05     Sodium 01/11/2022 138     Potassium 01/11/2022 3 6     Chloride 01/11/2022 101     CO2 01/11/2022 31     ANION GAP 01/11/2022 6     BUN 01/11/2022 17     Creatinine 01/11/2022 0 77     Glucose 01/11/2022 96     Calcium 01/11/2022 9 8     AST 01/11/2022 35     ALT 01/11/2022 54*    Alkaline Phosphatase 01/11/2022 107*    Total Protein 01/11/2022 7 3     Albumin 01/11/2022 4 5     Total Bilirubin 01/11/2022 0 31     eGFR 01/11/2022 87     Lipase 01/11/2022 136*         Radiology Results:   US abdomen complete    Result Date: 1/11/2022  Narrative: ABDOMEN ULTRASOUND, COMPLETE INDICATION:   ro billiary disease  Per ED notes, pain under the right breast, worse after eating  Epigastric and right upper quadrant pain  History of laparoscopic small bowel resection  The patient reports episodes for the past 6 months  Had pain today after lunch and again after dinner  COMPARISON: None TECHNIQUE:   Real-time ultrasound of the abdomen was performed with a curvilinear transducer with both volumetric sweeps and still imaging techniques  FINDINGS: PANCREAS:  Visualized portions of the pancreas are within normal limits  AORTA AND IVC:  Visualized portions are normal for patient age  LIVER: Size:  Within normal range  The liver measures 15 2 cm in the midclavicular line  Contour:  Surface contour is smooth  Parenchyma:  Echogenicity and echotexture are within normal limits  No evidence of suspicious mass  Limited imaging of the main portal vein shows it to be patent and hepatopetal  BILIARY: No gallbladder findings   No intrahepatic biliary dilatation  CBD measures 4 mm  No choledocholithiasis  KIDNEY: Right kidney measures 10 0 cm  Within normal limits  Left kidney measures 10 3 cm  Within normal limits  SPLEEN: Measures 8 6 cm  Within normal limits  ASCITES:  None  Impression: Normal sonographic appearance of the abdomen  Workstation performed: [id number]     CT Abdomen pelvis with contrast    Result Date: 1/11/2022  Narrative: CT ABDOMEN AND PELVIS WITH IV CONTRAST INDICATION:   Epigastric and right upper quadrant pain  COMPARISON:  None  TECHNIQUE:  CT examination of the abdomen and pelvis was performed  Axial, sagittal, and coronal 2D reformatted images were created from the source data and submitted for interpretation  Radiation dose length product (DLP) for this visit:  606 mGy-cm   This examination, like all CT scans performed in the [facility], was performed utilizing techniques to minimize radiation dose exposure, including the use of iterative reconstruction and automated exposure control  IV Contrast:  100 mL of iohexol (OMNIPAQUE) Enteric Contrast:  Enteric contrast was not administered  FINDINGS: ABDOMEN LOWER CHEST:  Clear lung bases  LIVER/BILIARY TREE:  Hepatomegaly  Subcentimeter low-attenuation lesions in the right lobe are likely to represent cysts  GALLBLADDER:  No calcified gallstones  No pericholecystic inflammatory change  SPLEEN:  Unremarkable  PANCREAS:  Unremarkable  ADRENAL GLANDS:  Unremarkable  KIDNEYS/URETERS:  Symmetric nephrographic phase enhancement of the kidneys  No obstructive uropathy  STOMACH AND BOWEL: Postsurgical change in the small bowel  No bowel obstruction, colitis or diverticulitis  Moderate amount stool and gas throughout the colon  APPENDIX:  No findings to suggest appendicitis  ABDOMINOPELVIC CAVITY:  No ascites  No pneumoperitoneum  No lymphadenopathy  VESSELS:  Patent portal, splenic mesenteric veins  PELVIS REPRODUCTIVE ORGANS:  Prior hysterectomy   URINARY BLADDER: Unremarkable  ABDOMINAL WALL/INGUINAL REGIONS:  Unremarkable  OSSEOUS STRUCTURES:  L4 anterolisthesis of 2 to 3 mm secondary to chronic disc and facet degenerative change  Impression: No evidence of bowel obstruction, colitis, diverticulitis or appendicitis   Workstation performed: [id number]     Answers for HPI/ROS submitted by the patient on 1/13/2022  Chronicity: chronic  Onset: more than 1 month ago  Onset quality: gradual  Frequency: 2 to 4 times per day  Episode duration: 1 hours  Progression since onset: gradually worsening  Pain location: RUQ, epigastric region  Pain - numeric: 8/10  Pain quality: aching  Radiates to: RUQ  anorexia: No  arthralgias: No  belching: Yes  constipation: Yes  diarrhea: No  dysuria: No  fever: No  flatus: No  frequency: No  headaches: No  hematochezia: No  hematuria: No  melena: No  myalgias: No  nausea: Yes  weight loss: No  vomiting: No  Aggravated by: nothing  Relieved by: belching  Diagnostic workup: CT scan, lower endoscopy, ultrasound

## 2022-01-15 LAB
A1AT SERPL-MCNC: 167 MG/DL (ref 101–187)
ACTIN IGG SERPL-ACNC: 67 UNITS (ref 0–19)
CERULOPLASMIN SERPL-MCNC: 39.5 MG/DL (ref 19–39)
MITOCHONDRIA M2 IGG SER-ACNC: 69.2 UNITS (ref 0–20)

## 2022-01-17 ENCOUNTER — TELEPHONE (OUTPATIENT)
Dept: OTHER | Facility: OTHER | Age: 57
End: 2022-01-17

## 2022-01-17 DIAGNOSIS — R79.89 ABNORMAL LIVER FUNCTION TESTS: ICD-10-CM

## 2022-01-17 DIAGNOSIS — R74.8 ELEVATED ALKALINE PHOSPHATASE LEVEL: Primary | ICD-10-CM

## 2022-01-17 DIAGNOSIS — R10.11 RUQ PAIN: ICD-10-CM

## 2022-01-17 LAB — RYE IGE QN: NEGATIVE

## 2022-01-18 NOTE — TELEPHONE ENCOUNTER
Patient called back inquiring results again stating its a little concerning I can see them in my chart  Made patient aware someone will call her when they are resulted

## 2022-01-18 NOTE — TELEPHONE ENCOUNTER
Reviewed test results with patient and her daughter Macho Guzmán  Will order MRCP and have patient follow up at visit scheduled in February

## 2022-01-18 NOTE — TELEPHONE ENCOUNTER
I tried to call her at [phone] twice between 11:30 and 1  Can you confirm number and when patient will be available to answer?

## 2022-01-18 NOTE — TELEPHONE ENCOUNTER
Patient called back, she stated if you call her within the next 20 minutes she can answer or anytime after 5 pm  As she drives school bus

## 2022-01-26 DIAGNOSIS — F32.A DEPRESSION, UNSPECIFIED DEPRESSION TYPE: ICD-10-CM

## 2022-01-26 DIAGNOSIS — K59.09 CHRONIC CONSTIPATION: ICD-10-CM

## 2022-01-26 DIAGNOSIS — K21.9 GASTROESOPHAGEAL REFLUX DISEASE WITHOUT ESOPHAGITIS: ICD-10-CM

## 2022-01-26 RX ORDER — ESCITALOPRAM OXALATE 10 MG/1
10 TABLET ORAL DAILY
Qty: 90 TABLET | Refills: 0 | Status: SHIPPED | OUTPATIENT
Start: 2022-01-26 | End: 2022-02-02 | Stop reason: SDUPTHER

## 2022-01-26 RX ORDER — PANTOPRAZOLE SODIUM 40 MG/1
40 TABLET, DELAYED RELEASE ORAL DAILY
Qty: 30 TABLET | Refills: 0 | Status: SHIPPED | OUTPATIENT
Start: 2022-01-26 | End: 2022-02-26 | Stop reason: SDUPTHER

## 2022-01-29 ENCOUNTER — HOSPITAL ENCOUNTER (OUTPATIENT)
Dept: MRI IMAGING | Facility: HOSPITAL | Age: 57
Discharge: HOME | End: 2022-01-29
Attending: STUDENT IN AN ORGANIZED HEALTH CARE EDUCATION/TRAINING PROGRAM
Payer: COMMERCIAL

## 2022-01-29 DIAGNOSIS — R79.89 ABNORMAL LIVER FUNCTION TESTS: ICD-10-CM

## 2022-01-29 DIAGNOSIS — R10.11 RUQ PAIN: ICD-10-CM

## 2022-01-29 DIAGNOSIS — R74.8 ELEVATED ALKALINE PHOSPHATASE LEVEL: ICD-10-CM

## 2022-01-29 PROCEDURE — A9585 GADOBUTROL INJECTION: HCPCS | Performed by: STUDENT IN AN ORGANIZED HEALTH CARE EDUCATION/TRAINING PROGRAM

## 2022-01-29 PROCEDURE — 74183 MRI ABD W/O CNTR FLWD CNTR: CPT

## 2022-01-29 RX ADMIN — GADOBUTROL 6 ML: 604.72 INJECTION INTRAVENOUS at 11:30

## 2022-02-02 ENCOUNTER — TELEPHONE (OUTPATIENT)
Dept: UROLOGY | Facility: AMBULATORY SURGERY CENTER | Age: 57
End: 2022-02-02

## 2022-02-02 ENCOUNTER — ANESTHESIA (OUTPATIENT)
Dept: GASTROENTEROLOGY | Facility: HOSPITAL | Age: 57
End: 2022-02-02

## 2022-02-02 ENCOUNTER — ANESTHESIA EVENT (OUTPATIENT)
Dept: GASTROENTEROLOGY | Facility: HOSPITAL | Age: 57
End: 2022-02-02

## 2022-02-02 ENCOUNTER — TELEPHONE (OUTPATIENT)
Dept: OTHER | Facility: HOSPITAL | Age: 57
End: 2022-02-02

## 2022-02-02 ENCOUNTER — HOSPITAL ENCOUNTER (OUTPATIENT)
Dept: GASTROENTEROLOGY | Facility: HOSPITAL | Age: 57
Setting detail: OUTPATIENT SURGERY
Discharge: HOME | End: 2022-02-02
Admitting: STUDENT IN AN ORGANIZED HEALTH CARE EDUCATION/TRAINING PROGRAM
Payer: COMMERCIAL

## 2022-02-02 VITALS
SYSTOLIC BLOOD PRESSURE: 107 MMHG | DIASTOLIC BLOOD PRESSURE: 65 MMHG | OXYGEN SATURATION: 100 % | RESPIRATION RATE: 18 BRPM | HEART RATE: 97 BPM | TEMPERATURE: 98.9 F

## 2022-02-02 DIAGNOSIS — N28.89 RENAL MASS: Primary | ICD-10-CM

## 2022-02-02 DIAGNOSIS — K21.9 GASTROESOPHAGEAL REFLUX DISEASE WITHOUT ESOPHAGITIS: ICD-10-CM

## 2022-02-02 DIAGNOSIS — F32.A DEPRESSION, UNSPECIFIED DEPRESSION TYPE: ICD-10-CM

## 2022-02-02 PROCEDURE — 88305 TISSUE EXAM BY PATHOLOGIST: CPT | Performed by: PATHOLOGY

## 2022-02-02 PROCEDURE — 43239 EGD BIOPSY SINGLE/MULTIPLE: CPT | Performed by: STUDENT IN AN ORGANIZED HEALTH CARE EDUCATION/TRAINING PROGRAM

## 2022-02-02 RX ORDER — ESCITALOPRAM OXALATE 10 MG/1
10 TABLET ORAL DAILY
Qty: 90 TABLET | Refills: 1 | Status: SHIPPED | OUTPATIENT
Start: 2022-02-02 | End: 2022-04-03 | Stop reason: SDUPTHER

## 2022-02-02 RX ORDER — SODIUM CHLORIDE, SODIUM LACTATE, POTASSIUM CHLORIDE, CALCIUM CHLORIDE 600; 310; 30; 20 MG/100ML; MG/100ML; MG/100ML; MG/100ML
125 INJECTION, SOLUTION INTRAVENOUS CONTINUOUS
Status: DISCONTINUED | OUTPATIENT
Start: 2022-02-02 | End: 2022-02-06 | Stop reason: HOSPADM

## 2022-02-02 RX ORDER — LIDOCAINE HYDROCHLORIDE 20 MG/ML
INJECTION, SOLUTION EPIDURAL; INFILTRATION; INTRACAUDAL; PERINEURAL AS NEEDED
Status: DISCONTINUED | OUTPATIENT
Start: 2022-02-02 | End: 2022-02-02

## 2022-02-02 RX ORDER — PROPOFOL 10 MG/ML
INJECTION, EMULSION INTRAVENOUS AS NEEDED
Status: DISCONTINUED | OUTPATIENT
Start: 2022-02-02 | End: 2022-02-02

## 2022-02-02 RX ADMIN — PROPOFOL 130 MG: 10 INJECTION, EMULSION INTRAVENOUS at 10:50

## 2022-02-02 RX ADMIN — SODIUM CHLORIDE, SODIUM LACTATE, POTASSIUM CHLORIDE, AND CALCIUM CHLORIDE 125 ML/HR: .6; .31; .03; .02 INJECTION, SOLUTION INTRAVENOUS at 09:32

## 2022-02-02 RX ADMIN — LIDOCAINE HYDROCHLORIDE 100 MG: 20 INJECTION, SOLUTION EPIDURAL; INFILTRATION; INTRACAUDAL; PERINEURAL at 10:50

## 2022-02-02 RX ADMIN — PROPOFOL 20 MG: 10 INJECTION, EMULSION INTRAVENOUS at 10:52

## 2022-02-02 RX ADMIN — PROPOFOL 20 MG: 10 INJECTION, EMULSION INTRAVENOUS at 10:54

## 2022-02-02 NOTE — H&P
History and Physical - [facility]  Cipriano Montilla 55 y o  female MRN: [id number]                  HPI: Cipriano Montilla is a 55y o  year old female who presents for GERD      REVIEW OF SYSTEMS: Per the HPI, and otherwise unremarkable      Historical Information   Past Medical History:   Diagnosis Date    Cervical cancer ([address] )     Depression     GERD (gastroesophageal reflux disease)     Migraine      Past Surgical History:   Procedure Laterality Date    HYSTERECTOMY      LAPAROSCOPIC SMALL BOWEL RESECTION      WRIST SURGERY Right      Social History   Social History     Substance and Sexual Activity   Alcohol Use Yes    Comment: socially     Social History     Substance and Sexual Activity   Drug Use No     Social History     Tobacco Use   Smoking Status Never Smoker   Smokeless Tobacco Never Used     Family History   Problem Relation Age of Onset    No Known Problems Mother     No Known Problems Father     Esophageal cancer Neg Hx        Meds/Allergies       Current Outpatient Medications:     Biotin 1 MG CAPS    cholecalciferol (VITAMIN D3) 1,000 units tablet    escitalopram (Lexapro) 10 mg tablet    LORazepam (Ativan) 0 5 mg tablet    lubiprostone (Amitiza) 24 mcg capsule    pantoprazole (PROTONIX) 40 mg tablet    rosuvastatin (CRESTOR) 10 MG tablet    EMGALITY 120 MG/ML SOSY    ketorolac (TORADOL) 10 mg tablet    ketorolac (TORADOL) 30 mg/mL injection    Lidocaine Viscous HCl (XYLOCAINE) 2 % mucosal solution    ondansetron (ZOFRAN) 4 mg tablet    Current Facility-Administered Medications:     lactated ringers infusion, 125 mL/hr, Intravenous, Continuous, 125 mL/hr at 02/02/22 0932    ondansetron (ZOFRAN-ODT) dispersible tablet 4 mg, 4 mg, Oral, Q6H PRN, 4 mg at 11/17/18 1717    No Known Allergies    Objective     /63   Pulse 76   Temp 98 9 °F (37 2 °C) (Temporal)   Resp 18   SpO2 98%       PHYSICAL EXAM    Gen: NAD  Head: NCAT  CV: RRR  CHEST: Clear  ABD: soft, NT/ND  EXT: no edema      ASSESSMENT/PLAN:  This is a 55y o  year old female here for EGD, and she is stable and optimized for her procedure

## 2022-02-02 NOTE — ANESTHESIA PREPROCEDURE EVALUATION
Procedure:  EGD    Relevant Problems   CARDIO   (+) Chronic migraine without aura, intractable, without status migrainosus   (+) Hyperlipidemia   (+) Migraine headache      GI/HEPATIC   (+) Gastroesophageal reflux disease without esophagitis      GYN   (+) Malignant neoplasm of overlapping sites of cervix ([facility] [address] )      HEMATOLOGY   (+) Other hemoglobinopathies (HCC)      NEURO/PSYCH   (+) Chronic migraine without aura, intractable, without status migrainosus   (+) Depression   (+) Migraine headache   (+) Panic attacks      Normal sinus rhythm  Normal ECG  No previous ECGs available  Confirmed by [facility] ([id number]) on 11/29/2018 10:37:30 AM    Physical Exam    Airway    Mallampati score: I    Neck ROM: full     Dental   No notable dental hx     Cardiovascular  Rhythm: regular, Rate: normal,     Pulmonary  Breath sounds clear to auscultation,     Other Findings        Anesthesia Plan  ASA Score- 2     Anesthesia Type- IV sedation with anesthesia with ASA Monitors  Additional Monitors:   Airway Plan:           Plan Factors-Exercise tolerance (METS): >4 METS  Chart reviewed  Patient summary reviewed              Induction-     Postoperative Plan-     Informed Consent-

## 2022-02-02 NOTE — TELEPHONE ENCOUNTER
Complaint/Diagnosis:Renal Mass    Insurance:[employer]     History of cancer:no    Previous urologist:no    OutsideTesting/where:epic    If yes,what kind:epic    Records requested/where:epic    Preferred location: ?

## 2022-02-02 NOTE — TELEPHONE ENCOUNTER
Unable to reach patient  Please call her to inform her that her MRI showed an incidental 1 3 cm lesion on her right kidney  Her liver looks good  She needs to see urology for further evaluation  I have placed the referral and she can call scheduling to schedule  If she would like to speak to me or have me speak to her daughter, I am happy to do that  Just ask her what times she'll be available by phone

## 2022-02-02 NOTE — ANESTHESIA POSTPROCEDURE EVALUATION
Post-Op Assessment Note    CV Status:  Stable    Pain management: satisfactory to patient     Mental Status:  Alert and awake   Hydration Status:  Stable   PONV Controlled:  None   Airway Patency:  Patent      Post Op Vitals Reviewed: Yes      Staff: CRNA         No complications documented      /65 (02/02/22 1106)    Temp      Pulse 65 (02/02/22 1106)   Resp 16 (02/02/22 1106)    SpO2 100 % (02/02/22 1106)

## 2022-02-03 ENCOUNTER — TELEPHONE (OUTPATIENT)
Dept: GASTROENTEROLOGY | Facility: CLINIC | Age: 57
End: 2022-02-03

## 2022-02-03 NOTE — TELEPHONE ENCOUNTER
Patient scheduled for 2/9/22 at 3pm with Dr Darcy Iverson  Of note patient lives in [place] and may prefer another office  Please confirm

## 2022-02-03 NOTE — TELEPHONE ENCOUNTER
Patients GI provider:  Dr Carol Dailey    Number to return call: [phone]    Reason for call: Pt calling requesting to speak with Agustín Moisés in regards to her neuro referral  Pt can be reached at above number      Scheduled procedure/appointment date if applicable: Appt: 7/50/4052

## 2022-02-04 NOTE — TELEPHONE ENCOUNTER
Patient cancelled appt in [facility] stated it was to far to travel       Please call patient with appt

## 2022-02-14 ENCOUNTER — TELEPHONE (OUTPATIENT)
Dept: UROLOGY | Facility: MEDICAL CENTER | Age: 57
End: 2022-02-14

## 2022-02-14 ENCOUNTER — OFFICE VISIT (OUTPATIENT)
Dept: UROLOGY | Facility: CLINIC | Age: 57
End: 2022-02-14
Payer: COMMERCIAL

## 2022-02-14 VITALS
DIASTOLIC BLOOD PRESSURE: 88 MMHG | BODY MASS INDEX: 18.9 KG/M2 | HEIGHT: 71 IN | HEART RATE: 78 BPM | SYSTOLIC BLOOD PRESSURE: 128 MMHG | WEIGHT: 135 LBS

## 2022-02-14 DIAGNOSIS — F41.0 PANIC ATTACKS: ICD-10-CM

## 2022-02-14 DIAGNOSIS — N28.89 RIGHT RENAL MASS: Primary | ICD-10-CM

## 2022-02-14 DIAGNOSIS — C53.8 MALIGNANT NEOPLASM OF OVERLAPPING SITES OF CERVIX (HCC): Primary | ICD-10-CM

## 2022-02-14 DIAGNOSIS — N28.89 RIGHT RENAL MASS: ICD-10-CM

## 2022-02-14 PROCEDURE — 99205 OFFICE O/P NEW HI 60 MIN: CPT | Performed by: UROLOGY

## 2022-02-14 NOTE — TELEPHONE ENCOUNTER
Patient managed by Dr Silvia Gonzalez in [address] [facility]    Patient's daughter called stating she was just in the office and saw Dr Silvia Gonzalez and she decide on the procedure  biopsy with aplasia   Please arrange and let her know

## 2022-02-14 NOTE — TELEPHONE ENCOUNTER
Patient wishes to proceed with interventional radiology biopsy and cryoablation of small endophytic RIGHT posterior renal mass  IR consult placed, will need followup after procedure with me for pathology and surveillance scheduling

## 2022-02-14 NOTE — PROGRESS NOTES
UROLOGY NEW CONSULT NOTE     CHIEF COMPLAINT   Tiffani Servin is a 55 y o  female with a complaint of   Chief Complaint   Patient presents with    renal mass       History of Present Illness:     55 y o  female, with a personal history of cervical cancer and a family history of kidney cancer presents after recent CT and MRI demonstrated a small renal tumor on the right side  Patient has a history of cervical cancer status post hysterectomy, she then developed recurrence in the ovary and return to the operating room for oophorectomy  Patient has a history of perforated bowel likely related to postsurgical adhesions that required exploratory laparotomy  She does report a family history of kidney cancer in her sister who had her kidney removed and in her uncle  She presents with her daughter for discussion who is a nurse practitioner at [employer]  Past Medical History:     Past Medical History:   Diagnosis Date    Cervical cancer ([address] )     Depression     GERD (gastroesophageal reflux disease)     Migraine        PAST SURGICAL HISTORY:     Past Surgical History:   Procedure Laterality Date    HYSTERECTOMY      LAPAROSCOPIC SMALL BOWEL RESECTION      WRIST SURGERY Right        CURRENT MEDICATIONS:     Current Outpatient Medications   Medication Sig Dispense Refill    Biotin 1 MG CAPS Take 1 capsule (1 mg total) by mouth daily      cholecalciferol (VITAMIN D3) 1,000 units tablet Take 1 tablet (1,000 Units total) by mouth daily      EMGALITY 120 MG/ML SOSY INJECT 2 SYRINGES (240MG) AS A LOADING DOSE MONTH ONE  THEN 1 S   (REFER TO PRESCRIPTION NOTES)   escitalopram (Lexapro) 10 mg tablet Take 1 tablet (10 mg total) by mouth daily 90 tablet 1    ketorolac (TORADOL) 10 mg tablet take 1 tablet by mouth every 6 hours if needed for MODERATE PAIN      (REFER TO PRESCRIPTION NOTES)        ketorolac (TORADOL) 30 mg/mL injection ketorolac 30 mg/mL (1 mL) injection solution      LORazepam (Ativan) 0 5 mg tablet Take 1 tablet (0 5 mg total) by mouth 2 (two) times a day 60 tablet 1    lubiprostone (Amitiza) 24 mcg capsule Take 1 capsule (24 mcg total) by mouth 2 (two) times a day with meals 60 capsule 0    pantoprazole (PROTONIX) 40 mg tablet Take 1 tablet (40 mg total) by mouth daily 30 tablet 0    rosuvastatin (CRESTOR) 10 MG tablet TAKE 1 TABLET DAILY 90 tablet 3    ondansetron (ZOFRAN) 4 mg tablet Take 1 tablet (4 mg total) by mouth every 6 (six) hours 12 tablet 0     Current Facility-Administered Medications   Medication Dose Route Frequency Provider Last Rate Last Admin    ondansetron (ZOFRAN-ODT) dispersible tablet 4 mg  4 mg Oral Q6H PRN RUPINDER Delgado   4 mg at 11/17/18 1717       ALLERGIES:   No Known Allergies    SOCIAL HISTORY:     Social History     Socioeconomic History    Marital status: /Civil Union     Spouse name: None    Number of children: None    Years of education: None    Highest education level: None   Occupational History    Occupation:    Tobacco Use    Smoking status: Never Smoker    Smokeless tobacco: Never Used   Vaping Use    Vaping Use: Never used   Substance and Sexual Activity    Alcohol use: Yes     Comment: socially    Drug use: No    Sexual activity: None   Other Topics Concern    None   Social History Narrative    None     Social Determinants of Health     Financial Resource Strain: Not on file   Food Insecurity: Not on file   Transportation Needs: Not on file   Physical Activity: Not on file   Stress: Not on file   Social Connections: Not on file   Intimate Partner Violence: Not on file   Housing Stability: Not on file       SOCIAL HISTORY:     Family History   Problem Relation Age of Onset    No Known Problems Mother     No Known Problems Father     Esophageal cancer Neg Hx        REVIEW OF SYSTEMS:     Review of Systems   Constitutional: Negative  Respiratory: Negative  Cardiovascular: Negative  Gastrointestinal: Negative  Genitourinary: Negative  Musculoskeletal: Negative  Skin: Negative  Psychiatric/Behavioral: The patient is nervous/anxious  PHYSICAL EXAM:     /88 (BP Location: Left arm, Patient Position: Sitting, Cuff Size: Adult)   Pulse 78   Ht 5' 11" (1 803 m)   Wt 61 2 kg (135 lb)   BMI 18 83 kg/m²     General:  Healthy appearing female in no acute distress  They have a normal affect  There is not appear to be any gross neurologic defects or abnormalities  HEENT:  Normocephalic, atraumatic  Neck is supple without any palpable lymphadenopathy  Cardiovascular:  Patient has normal palpable distal radial pulses  There is no significant peripheral edema  No JVD is noted  Respiratory:  Patient has unlabored respirations  There is no audible wheeze or rhonchi  Abdomen:  Abdomen is with healed surgical scars  Abdomen is soft and nontender  There is no tympany  Inguinal and umbilical hernia are not appreciated  Musculoskeletal:  Patient does not have significant CVA tenderness in the  flank with palpation or percussion  They full range of motion in all 4 extremities  Strength in all 4 extremities appears congruent  Patient is able to ambulate without assistance or difficulty  Dermatologic:  Patient has no skin abnormalities or rashes        LABS:     CBC:   Lab Results   Component Value Date    WBC 7 00 2022    HGB 11 9 2022    HCT 36 9 2022    MCV 93 2022     2022       BMP:   Lab Results   Component Value Date    GLUCOSE 91 2014    CALCIUM 9 8 2022     2014    K 3 6 2022    CO2 31 2022     2022    BUN 17 2022    CREATININE 0 77 2022       IMAGIN/29/22  MRI OF THE ABDOMEN WITH AND WITHOUT CONTRAST WITH MRCP     INDICATION:  Abnormal liver tests, positive AMA and ASMA     COMPARISON: CT and ultrasound of the abdomen dated 2022     TECHNIQUE:  The following pulse sequences were obtained:  Coronal and axial T2 with TE of 90 and 180 respectively, axial T2 with fat saturation, axial FIESTA fat-sat, axial T1-weighted in-and-out-of phase, axial DWI/ADC, pre-contrast axial T1 with fat   saturation, post-contrast dynamic axial T1 with fat saturation at 20, 70, and 180 seconds, followed by coronal and 7 minute delayed axial T1 with fat saturation  3D MRCP images were obtained with radial thick slabs and projections  3D rendering was   performed from the acquisition scanner          IV Contrast:  6 mL of Gadobutrol injection (SINGLE-DOSE)      FINDINGS:     LOWER CHEST:   Unremarkable      LIVER:   Normal in size and configuration  There are a couple subcentimeter T2 hyperintense nonenhancing left hepatic lobe lesions consistent with hepatic cysts  There are no suspicious enhancing hepatic masses  The hepatic veins and portal veins are patent         BILE DUCTS:  No intrahepatic or extrahepatic bile duct dilation  Common bile duct is normal in caliber  No choledocholithiasis, biliary stricture or suspicious mass       GALLBLADDER:  Normal      PANCREAS:  Unremarkable      ADRENAL GLANDS:  Normal      SPLEEN:  Normal      KIDNEYS/PROXIMAL URETERS:  No hydroureteronephrosis  On image 235 series 12, there is a 1 3 cm complex right renal lesion which appears to exhibit internal enhancement as compared to precontrast imaging, is mildly T2 hyperintense relative to right renal   parenchyma and is hypointense to isointense to the right kidney on T1-weighted imaging    The constellation of findings are suspicious for enhancing renal neoplasm      BOWEL:   No dilated loops of bowel       PERITONEUM/RETROPERITONEUM:  No ascites      LYMPH NODES:  No abdominal lymphadenopathy      VASCULAR STRUCTURES:  No aneurysm      ABDOMINAL WALL:  Unremarkable      OSSEOUS STRUCTURES:  Degenerative changes are noted involving the spine      IMPRESSION:     1   1 3 cm complex right renal mass with findings suspicious for internal enhancement  Findings are suspicious for neoplasm, see above for details      2  The etiology of the patient is clinical symptomatology with abnormal liver tests is not definitively identified on MRI  There is a uniform appearance to the liver without abnormal enhancement or nodular contour  The biliary system is within normal   limits  If there is clinical concern for radiographically occult osseous involving the liver, consider further evaluation tissue sampling as clinically indicated  ASSESSMENT:     55 y o  female with small endophytic right posterior renal mass    PLAN:     I discussed with the patient and her daughter the findings of the CT which we personally reviewed and the MRI which again we personally reviewed  We discussed the small nature of the renal mass however the enhancement characteristics that likely confer a risk of malignancy in approximately 80% of cases  We discussed the options for treatment which include surgical resection in the form of radical or partial nephrectomy  We discussed alternatives in the form of biopsy and/or cryoablation and lastly active surveillance  Given the patient's history of multiple abdominal surgeries with perforated bowel, I think transabdominal or trans peritoneal surgery would have increased risk  Additionally, the tumor characteristics are a small posterior endophytic renal lesion  Mobilization of the kidney would be challenging as would location and dissection of this small renal lesion  As such, the surgical risks increased  We did discuss the option of retroperitoneal surgery to avoid the peritoneal cavity  The patient and her daughter are inclined to avoid any surgical intervention given the prior interventions and risk  We then discussed the option of biopsy to rule out a metastatic cervical lesion    I discussed with them that I feel this is unlikely to be a metastatic process however biopsy would help confirm  We discussed the utility of cryoablation or treatment of the tumor at the time of biopsy which would be my strong recommendation  This would avoid multiple procedures  We discussed the long-term outcome for cryo ablated therapy and the inherent risks and benefits  We last discussed active surveillance which I think is a very reasonable option for patients to have poor surgical risks and prefer to monitor their tumor  Patient is not inclined to simply monitor this with imaging is most likely going to want to move forward with more expedited treatment  The seem to be lightening towards interventional radiology biopsy and cryoablation at this point but would like to discuss further with her family and will call back to let me know their final plan

## 2022-02-16 ENCOUNTER — PATIENT MESSAGE (OUTPATIENT)
Dept: GASTROENTEROLOGY | Facility: CLINIC | Age: 57
End: 2022-02-16

## 2022-02-16 DIAGNOSIS — R79.89 ABNORMAL LFTS: Primary | ICD-10-CM

## 2022-02-16 NOTE — TELEPHONE ENCOUNTER
Patient called stating she was schedule for an appointment to see Dr Gerhard Wilkes on 03/08 and she stated nobody called her to see if she is able to make appointment  She stated she doesn't have any procedure scheduled yet so why does she have an appointment set up  She stated she would like to be contacted to set up appointment  She is a  and she needs to make arrangements    She stated she will be available tomorrow around 10 am   Right now she will be not be available til after 4 pm   Please call her after 4 pm or tomorrow at 10 am

## 2022-02-16 NOTE — TELEPHONE ENCOUNTER
Called and spoke with patient  Informed patient that I was holding 03/08/22 with Dr Vivian Busch  Patient states that she would like to speak with her GI doctor first because she needs a liver biopsy and would like to know if they can do both of them together  Patient will call office back after speaking with her GI doctor

## 2022-02-21 ENCOUNTER — TELEMEDICINE (OUTPATIENT)
Dept: INTERVENTIONAL RADIOLOGY/VASCULAR | Facility: CLINIC | Age: 57
End: 2022-02-21
Payer: COMMERCIAL

## 2022-02-21 ENCOUNTER — PREP FOR PROCEDURE (OUTPATIENT)
Dept: INTERVENTIONAL RADIOLOGY/VASCULAR | Facility: CLINIC | Age: 57
End: 2022-02-21

## 2022-02-21 DIAGNOSIS — N28.89 RENAL MASS: Primary | ICD-10-CM

## 2022-02-21 DIAGNOSIS — R79.89 ELEVATED LFTS: ICD-10-CM

## 2022-02-21 DIAGNOSIS — N28.89 RIGHT RENAL MASS: ICD-10-CM

## 2022-02-21 PROCEDURE — 99202 OFFICE O/P NEW SF 15 MIN: CPT | Performed by: RADIOLOGY

## 2022-02-21 NOTE — PROGRESS NOTES
Virtual Brief Visit    Patient is located in the following state in which I hold an active license [place]      Assessment/Plan:  55year old female with incidentally found right superior pole renal mass measuring 1 6 x 1 4 x 1 5 cm (AP x Trans x CC) with MRI characteristics concerning for RCC  Patient was discussed risks of renal mass cryoablation including renal bleeding  Patient would like to proceed with the cryoablation  Renal mass biopsy will be performed at the same time  Patient also has elevated LFTs and is referred for random liver biopsy by GI  Patient would prefer to have the liver biopsy at the same time as the renal mass ablation  Patient was discussed risks of liver biopsy including bleeding  Patient will be scheduled for liver biopsy on same day as renal mass cryoablation  Problem List Items Addressed This Visit     None      Visit Diagnoses     Right renal mass              Recent Visits  No visits were found meeting these conditions  Showing recent visits within past 7 days and meeting all other requirements  Future Appointments  No visits were found meeting these conditions    Showing future appointments within next 150 days and meeting all other requirements         I spent 15 minutes directly with the patient during this visit

## 2022-02-28 ENCOUNTER — TELEPHONE (OUTPATIENT)
Dept: UROLOGY | Facility: CLINIC | Age: 57
End: 2022-02-28

## 2022-02-28 NOTE — TELEPHONE ENCOUNTER
----- Message from Tanvi Leger sent at 2/28/2022  9:51 AM EST -----  Patient has cryoablation scheduled for 3/15/22  Please schedule urology f/u appt for 2-4 weeks after   Thank you

## 2022-02-28 NOTE — TELEPHONE ENCOUNTER
Called and left message for patient to give office a call back to schedule follow up with Dr Aarti Johnson

## 2022-03-01 DIAGNOSIS — K21.9 GASTROESOPHAGEAL REFLUX DISEASE WITHOUT ESOPHAGITIS: ICD-10-CM

## 2022-03-02 RX ORDER — PANTOPRAZOLE SODIUM 40 MG/1
40 TABLET, DELAYED RELEASE ORAL DAILY
Qty: 30 TABLET | Refills: 0 | OUTPATIENT
Start: 2022-03-02

## 2022-03-07 NOTE — PRE-PROCEDURE INSTRUCTIONS
Pre-procedure Instructions for Interventional Radiology  [address] [place] [id number]  [address] [place] [address] [phone]    You are scheduled for a/an Random Liver Biopsy and Cryoablation of Right Renal mass  On Tuesday 3/15/22  Your tentative arrival time is 0730  Short stay will notify you the day before your procedure with the exact arrival time and the location to arrive  To prepare for your procedure:  1  Please arrange for someone to drive you home after the procedure and stay with you until the next morning if you are instructed to do so  This is typically for patients receiving some type of sedative or anesthetic for the procedure  2  DO NOT EAT OR DRINK ANYTHING after midnight on the evening before your procedure including candy & gum   3  ONLY SIPS OF WATER with your medications are allowed on the morning of your procedure  4  TAKE ALL OF YOUR REGULAR MEDICATIONS THE MORNING OF YOUR PROCEDURE with sips of water! We may call you to stop some of your blood sugar, blood pressure and blood thinning medications depending on the procedure  Please take all of these medications unless we instruct you to stop them  5  If you have an allergy to x-ray dye, please contact Interventional Radiology for an x-ray dye preparation which usually consists of an oral steroid and Benadryl  The day of your procedure:  1  Bring a list of the medications you take at home  2  Bring medications you take for breathing problems (such as inhalers), medications for chest pain, or both  3  Bring a case for your glasses or contacts  4  Bring your insurance card and a form of photo ID   5  Please leave all valuables such as credit cards and jewelry at home  6  Report to the registration desk in the main lobby at the [facility]  Ask to be directed to [facility]    7  While your procedure is being performed, your family may wait in the Radiology Waiting Room on the 1st floor in Radiology  if they need to leave, they may provide a number to be called following the procedure  8  Be prepared to stay overnight just in case  Sometimes procedures will indicate the need for further observation or treatment  9  If you are scheduled for a follow-up visit with the Interventional Radiologist after your procedure, you will be called with a date and time  10  Covid vaccine and booster completed      Special Instructions (Medications to stop taking before your procedure etc ):

## 2022-03-15 ENCOUNTER — ANESTHESIA (OUTPATIENT)
Dept: RADIOLOGY | Facility: HOSPITAL | Age: 57
End: 2022-03-15

## 2022-03-15 ENCOUNTER — HOSPITAL ENCOUNTER (OUTPATIENT)
Dept: RADIOLOGY | Facility: HOSPITAL | Age: 57
Discharge: HOME | End: 2022-03-15
Attending: RADIOLOGY | Admitting: RADIOLOGY
Payer: COMMERCIAL

## 2022-03-15 ENCOUNTER — ANESTHESIA EVENT (OUTPATIENT)
Dept: RADIOLOGY | Facility: HOSPITAL | Age: 57
End: 2022-03-15

## 2022-03-15 ENCOUNTER — HOSPITAL ENCOUNTER (OUTPATIENT)
Dept: RADIOLOGY | Facility: HOSPITAL | Age: 57
Discharge: HOME | End: 2022-03-15
Attending: RADIOLOGY
Payer: COMMERCIAL

## 2022-03-15 VITALS
WEIGHT: 137 LBS | SYSTOLIC BLOOD PRESSURE: 103 MMHG | BODY MASS INDEX: 19.18 KG/M2 | TEMPERATURE: 98.6 F | HEART RATE: 74 BPM | OXYGEN SATURATION: 96 % | RESPIRATION RATE: 17 BRPM | DIASTOLIC BLOOD PRESSURE: 69 MMHG | HEIGHT: 71 IN

## 2022-03-15 VITALS
SYSTOLIC BLOOD PRESSURE: 120 MMHG | TEMPERATURE: 98 F | OXYGEN SATURATION: 97 % | RESPIRATION RATE: 11 BRPM | DIASTOLIC BLOOD PRESSURE: 70 MMHG | HEART RATE: 68 BPM

## 2022-03-15 DIAGNOSIS — R79.89 ELEVATED LFTS: ICD-10-CM

## 2022-03-15 DIAGNOSIS — N28.89 RENAL MASS: ICD-10-CM

## 2022-03-15 LAB
ERYTHROCYTE [DISTWIDTH] IN BLOOD BY AUTOMATED COUNT: 12.9 % (ref 11.6–15.1)
HCT VFR BLD AUTO: 38.9 % (ref 34.8–46.1)
HGB BLD-MCNC: 13 G/DL (ref 11.5–15.4)
INR PPP: 1.05 (ref 0.84–1.19)
MCH RBC QN AUTO: 29.6 PG (ref 26.8–34.3)
MCHC RBC AUTO-ENTMCNC: 33.4 G/DL (ref 31.4–37.4)
MCV RBC AUTO: 89 FL (ref 82–98)
PLATELET # BLD AUTO: 251 THOUSANDS/UL (ref 149–390)
PMV BLD AUTO: 9.3 FL (ref 8.9–12.7)
PROTHROMBIN TIME: 13.3 SECONDS (ref 11.6–14.5)
RBC # BLD AUTO: 4.39 MILLION/UL (ref 3.81–5.12)
WBC # BLD AUTO: 3.83 THOUSAND/UL (ref 4.31–10.16)

## 2022-03-15 PROCEDURE — 88342 IMHCHEM/IMCYTCHM 1ST ANTB: CPT | Performed by: PATHOLOGY

## 2022-03-15 PROCEDURE — 88307 TISSUE EXAM BY PATHOLOGIST: CPT | Performed by: PATHOLOGY

## 2022-03-15 PROCEDURE — 88305 TISSUE EXAM BY PATHOLOGIST: CPT | Performed by: PATHOLOGY

## 2022-03-15 PROCEDURE — 50200 RENAL BIOPSY PERQ: CPT | Performed by: RADIOLOGY

## 2022-03-15 PROCEDURE — 76942 ECHO GUIDE FOR BIOPSY: CPT

## 2022-03-15 PROCEDURE — 85610 PROTHROMBIN TIME: CPT | Performed by: RADIOLOGY

## 2022-03-15 PROCEDURE — 77013 CT GUIDE FOR TISSUE ABLATION: CPT

## 2022-03-15 PROCEDURE — 76942 ECHO GUIDE FOR BIOPSY: CPT | Performed by: RADIOLOGY

## 2022-03-15 PROCEDURE — 88313 SPECIAL STAINS GROUP 2: CPT | Performed by: PATHOLOGY

## 2022-03-15 PROCEDURE — 99152 MOD SED SAME PHYS/QHP 5/>YRS: CPT

## 2022-03-15 PROCEDURE — 99152 MOD SED SAME PHYS/QHP 5/>YRS: CPT | Performed by: RADIOLOGY

## 2022-03-15 PROCEDURE — 47000 NEEDLE BIOPSY OF LIVER PERQ: CPT

## 2022-03-15 PROCEDURE — 50593 PERC CRYO ABLATE RENAL TUM: CPT | Performed by: RADIOLOGY

## 2022-03-15 PROCEDURE — 50593 PERC CRYO ABLATE RENAL TUM: CPT

## 2022-03-15 PROCEDURE — 47000 NEEDLE BIOPSY OF LIVER PERQ: CPT | Performed by: RADIOLOGY

## 2022-03-15 PROCEDURE — 50200 RENAL BIOPSY PERQ: CPT

## 2022-03-15 PROCEDURE — 77013 CT GUIDE FOR TISSUE ABLATION: CPT | Performed by: RADIOLOGY

## 2022-03-15 PROCEDURE — 85027 COMPLETE CBC AUTOMATED: CPT | Performed by: RADIOLOGY

## 2022-03-15 PROCEDURE — C2618 PROBE/NEEDLE, CRYO: HCPCS

## 2022-03-15 PROCEDURE — 88341 IMHCHEM/IMCYTCHM EA ADD ANTB: CPT | Performed by: PATHOLOGY

## 2022-03-15 RX ORDER — LIDOCAINE HYDROCHLORIDE 10 MG/ML
INJECTION, SOLUTION EPIDURAL; INFILTRATION; INTRACAUDAL; PERINEURAL AS NEEDED
Status: DISCONTINUED | OUTPATIENT
Start: 2022-03-15 | End: 2022-03-15

## 2022-03-15 RX ORDER — SUCCINYLCHOLINE/SOD CL,ISO/PF 100 MG/5ML
SYRINGE (ML) INTRAVENOUS AS NEEDED
Status: DISCONTINUED | OUTPATIENT
Start: 2022-03-15 | End: 2022-03-15

## 2022-03-15 RX ORDER — FENTANYL CITRATE 50 UG/ML
INJECTION, SOLUTION INTRAMUSCULAR; INTRAVENOUS AS NEEDED
Status: DISCONTINUED | OUTPATIENT
Start: 2022-03-15 | End: 2022-03-15

## 2022-03-15 RX ORDER — SODIUM CHLORIDE, SODIUM LACTATE, POTASSIUM CHLORIDE, CALCIUM CHLORIDE 600; 310; 30; 20 MG/100ML; MG/100ML; MG/100ML; MG/100ML
INJECTION, SOLUTION INTRAVENOUS CONTINUOUS PRN
Status: DISCONTINUED | OUTPATIENT
Start: 2022-03-15 | End: 2022-03-15

## 2022-03-15 RX ORDER — ONDANSETRON 2 MG/ML
INJECTION INTRAMUSCULAR; INTRAVENOUS AS NEEDED
Status: DISCONTINUED | OUTPATIENT
Start: 2022-03-15 | End: 2022-03-15

## 2022-03-15 RX ORDER — FENTANYL CITRATE/PF 50 MCG/ML
25 SYRINGE (ML) INJECTION
Status: DISCONTINUED | OUTPATIENT
Start: 2022-03-15 | End: 2022-03-15 | Stop reason: HOSPADM

## 2022-03-15 RX ORDER — FENTANYL CITRATE 50 UG/ML
INJECTION, SOLUTION INTRAMUSCULAR; INTRAVENOUS CODE/TRAUMA/SEDATION MEDICATION
Status: COMPLETED | OUTPATIENT
Start: 2022-03-15 | End: 2022-03-15

## 2022-03-15 RX ORDER — LIDOCAINE WITH 8.4% SOD BICARB 0.9%(10ML)
SYRINGE (ML) INJECTION CODE/TRAUMA/SEDATION MEDICATION
Status: COMPLETED | OUTPATIENT
Start: 2022-03-15 | End: 2022-03-15

## 2022-03-15 RX ORDER — MIDAZOLAM HYDROCHLORIDE 2 MG/2ML
INJECTION, SOLUTION INTRAMUSCULAR; INTRAVENOUS CODE/TRAUMA/SEDATION MEDICATION
Status: COMPLETED | OUTPATIENT
Start: 2022-03-15 | End: 2022-03-15

## 2022-03-15 RX ORDER — ONDANSETRON 2 MG/ML
4 INJECTION INTRAMUSCULAR; INTRAVENOUS ONCE AS NEEDED
Status: DISCONTINUED | OUTPATIENT
Start: 2022-03-15 | End: 2022-03-15 | Stop reason: HOSPADM

## 2022-03-15 RX ORDER — ACETAMINOPHEN 325 MG/1
650 TABLET ORAL EVERY 4 HOURS PRN
Status: DISCONTINUED | OUTPATIENT
Start: 2022-03-15 | End: 2022-03-16 | Stop reason: HOSPADM

## 2022-03-15 RX ORDER — PROPOFOL 10 MG/ML
INJECTION, EMULSION INTRAVENOUS AS NEEDED
Status: DISCONTINUED | OUTPATIENT
Start: 2022-03-15 | End: 2022-03-15

## 2022-03-15 RX ORDER — EPHEDRINE SULFATE 50 MG/ML
INJECTION INTRAVENOUS AS NEEDED
Status: DISCONTINUED | OUTPATIENT
Start: 2022-03-15 | End: 2022-03-15

## 2022-03-15 RX ADMIN — EPHEDRINE SULFATE 5 MG: 50 INJECTION INTRAVENOUS at 10:06

## 2022-03-15 RX ADMIN — LIDOCAINE HYDROCHLORIDE 50 MG: 10 INJECTION, SOLUTION EPIDURAL; INFILTRATION; INTRACAUDAL; PERINEURAL at 10:06

## 2022-03-15 RX ADMIN — MIDAZOLAM 1 MG: 1 INJECTION INTRAMUSCULAR; INTRAVENOUS at 09:07

## 2022-03-15 RX ADMIN — ONDANSETRON 4 MG: 2 INJECTION INTRAMUSCULAR; INTRAVENOUS at 11:50

## 2022-03-15 RX ADMIN — EPHEDRINE SULFATE 5 MG: 50 INJECTION INTRAVENOUS at 10:19

## 2022-03-15 RX ADMIN — PROPOFOL 200 MG: 10 INJECTION, EMULSION INTRAVENOUS at 10:06

## 2022-03-15 RX ADMIN — FENTANYL CITRATE 50 MCG: 50 INJECTION INTRAMUSCULAR; INTRAVENOUS at 10:06

## 2022-03-15 RX ADMIN — Medication 10 ML: at 10:44

## 2022-03-15 RX ADMIN — FENTANYL CITRATE 50 MCG: 50 INJECTION INTRAMUSCULAR; INTRAVENOUS at 09:07

## 2022-03-15 RX ADMIN — PHENYLEPHRINE HYDROCHLORIDE 20 MCG/MIN: 10 INJECTION INTRAVENOUS at 10:39

## 2022-03-15 RX ADMIN — SODIUM CHLORIDE, SODIUM LACTATE, POTASSIUM CHLORIDE, AND CALCIUM CHLORIDE: .6; .31; .03; .02 INJECTION, SOLUTION INTRAVENOUS at 08:49

## 2022-03-15 RX ADMIN — EPHEDRINE SULFATE 10 MG: 50 INJECTION INTRAVENOUS at 10:28

## 2022-03-15 RX ADMIN — Medication 100 MG: at 10:06

## 2022-03-15 NOTE — SEDATION DOCUMENTATION
Random liver biopsy performed by Dr Daisha Blank  Procedure tolerated well, VSS  Cryoablation of right renal mass to follow

## 2022-03-15 NOTE — ANESTHESIA PREPROCEDURE EVALUATION
Procedure:  IR CRYOABLATION    Relevant Problems   CARDIO   (+) Chronic migraine without aura, intractable, without status migrainosus   (+) Hyperlipidemia   (+) Migraine headache      GI/HEPATIC   (+) Gastroesophageal reflux disease without esophagitis      GYN   (+) Malignant neoplasm of overlapping sites of cervix (HCC)      HEMATOLOGY   (+) Other hemoglobinopathies (HCC)      NEURO/PSYCH   (+) Chronic migraine without aura, intractable, without status migrainosus   (+) Depression   (+) Migraine headache   (+) Panic attacks        Physical Exam    Airway    Mallampati score: II  TM Distance: >3 FB  Neck ROM: full     Dental   No notable dental hx     Cardiovascular  Cardiovascular exam normal    Pulmonary  Pulmonary exam normal     Other Findings        Anesthesia Plan  ASA Score- 2     Anesthesia Type- general with ASA Monitors  Additional Monitors:   Airway Plan: LMA  Plan Factors-Exercise tolerance (METS): >4 METS  Chart reviewed  Existing labs reviewed  Patient summary reviewed  Patient is not a current smoker  Induction- intravenous  Postoperative Plan- Plan for postoperative opioid use  Planned trial extubation    Informed Consent- Anesthetic plan and risks discussed with patient and daughter  I personally reviewed this patient with the CRNA  Discussed and agreed on the Anesthesia Plan with the CRNA  Radha Hess is a 55 y o  with presenting today for IR biopsy and ablation  NPO since approrpatie  Took following medications as noted in chart  Discussed risk and benefits of general which include and are not limited to: MI, stroke, sore throat, PONV, post- op pain  No new symptoms of  CP, SOB, F, chills, N/V, dizziness, numbness or tingling, cough, and other symptoms except as noted  AQA  Consented

## 2022-03-15 NOTE — BRIEF OP NOTE (RAD/CATH)
INTERVENTIONAL RADIOLOGY PROCEDURE NOTE    Date: 3/15/2022    Procedure:  1  Liver biopsy   2  IR CRYOABLATION    Preoperative diagnosis:   1  Renal mass         Postoperative diagnosis: Same  Surgeon: Kwan Pettit MD     Assistant: None  No qualified resident was available  Blood loss: 6 mL    Specimens:  1  Liver core needle biopsy sample placed into formalin  2  1 core needle sample of right renal mass placed into formalin  Findings:   1  Successful non targeted liver biopsy  2  Successful superior pole right renal mass cryoablation with biopsy of the mass  Complications: None immediate      Anesthesia: local and general

## 2022-03-15 NOTE — ANESTHESIA POSTPROCEDURE EVALUATION
Post-Op Assessment Note    CV Status:  Stable  Pain Score: 0    Pain management: adequate     Mental Status:  Alert and arousable   Hydration Status:  Stable   PONV Controlled:  None      Post Op Vitals Reviewed: Yes      Staff: CRNA         No complications documented      /80 (03/15/22 1206)    Temp (!) 96 9 °F (36 1 °C) (03/15/22 1206)    Pulse 71 (03/15/22 1206)   Resp 17 (03/15/22 1206)    SpO2 98 % (03/15/22 1206)

## 2022-03-15 NOTE — SEDATION DOCUMENTATION
Cryoablation of right renal mass and biopsy performed by Dr Claudio Begin  Procedure tolerated well, anesthesia present throughout the case  Patient to go to PACU post  IR Procedure Bedrest Start Time is 1200

## 2022-03-15 NOTE — H&P
Interventional Radiology  History and Physical 3/15/2022     Han Carlos   1965   [id number]    Assessment/Plan:  55year old female with right superior pole renal mass concerning for RCC presents for renal cryoablation  Patient also has elevated LFTs and will require non targeted liver biopsy  Problem List Items Addressed This Visit     None      Visit Diagnoses     Elevated LFTs        Relevant Orders    IR biopsy liver random             Subjective:     Patient ID: Han Carlos is a 55 y o  female  History of Present Illness  Patient with right superior pole renal mass concerning for RCC presents for renal cryoablation  Patient also has elevated LFTs and will require non targeted liver biopsy  Please refer to my clinic note dated 2/21/2022 for further details  Review of Systems      Past Medical History:   Diagnosis Date    Cervical cancer ([address] )     Depression     GERD (gastroesophageal reflux disease)     Migraine         Past Surgical History:   Procedure Laterality Date    HYSTERECTOMY      LAPAROSCOPIC SMALL BOWEL RESECTION      WRIST SURGERY Right         Social History     Tobacco Use   Smoking Status Never Smoker   Smokeless Tobacco Never Used        Social History     Substance and Sexual Activity   Alcohol Use Yes    Comment: socially        Social History     Substance and Sexual Activity   Drug Use No        No Known Allergies    Current Outpatient Medications   Medication Sig Dispense Refill    Biotin 1 MG CAPS Take 1 capsule (1 mg total) by mouth daily      cholecalciferol (VITAMIN D3) 1,000 units tablet Take 1 tablet (1,000 Units total) by mouth daily      EMGALITY 120 MG/ML SOSY INJECT 2 SYRINGES (240MG) AS A LOADING DOSE MONTH ONE  THEN 1 S   (REFER TO PRESCRIPTION NOTES)        escitalopram (Lexapro) 10 mg tablet Take 1 tablet (10 mg total) by mouth daily 90 tablet 1    ketorolac (TORADOL) 10 mg tablet take 1 tablet by mouth every 6 hours if needed for MODERATE PAIN      (REFER TO PRESCRIPTION NOTES)   ketorolac (TORADOL) 30 mg/mL injection ketorolac 30 mg/mL (1 mL) injection solution      LORazepam (Ativan) 0 5 mg tablet Take 1 tablet (0 5 mg total) by mouth 2 (two) times a day 60 tablet 1    lubiprostone (Amitiza) 24 mcg capsule Take 1 capsule (24 mcg total) by mouth 2 (two) times a day with meals 60 capsule 0    ondansetron (ZOFRAN) 4 mg tablet Take 1 tablet (4 mg total) by mouth every 6 (six) hours 12 tablet 0    pantoprazole (PROTONIX) 40 mg tablet Take 1 tablet (40 mg total) by mouth daily 30 tablet 2    rosuvastatin (CRESTOR) 10 MG tablet TAKE 1 TABLET DAILY 90 tablet 3     Current Facility-Administered Medications   Medication Dose Route Frequency Provider Last Rate Last Admin    ondansetron (ZOFRAN-ODT) dispersible tablet 4 mg  4 mg Oral Q6H PRN RUPINDER Cade   4 mg at 11/17/18 1717          Objective:    Vitals:    03/15/22 0753   BP: 120/71   Pulse: 75   Resp: 18   Temp: 98 6 °F (37 °C)   TempSrc: Oral   SpO2: 97%   Weight: 62 1 kg (137 lb)   Height: 5' 11" (1 803 m)        Physical Exam  Constitutional:       Appearance: Normal appearance  Cardiovascular:      Rate and Rhythm: Normal rate  Pulmonary:      Effort: Pulmonary effort is normal    Skin:     General: Skin is dry  No results found for: BNP   Lab Results   Component Value Date    WBC 7 00 01/11/2022    HGB 11 9 01/11/2022    HCT 36 9 01/11/2022    MCV 93 01/11/2022     01/11/2022     No results found for: INR, PROTIME  No results found for: PTT      I have personally reviewed pertinent imaging and laboratory results  Code Status: No Order  Advance Directive and Living Will:      Power of :    POLST:      This text is generated with voice recognition software  There may be translation, syntax,  or grammatical errors  If you have any questions, please contact the dictating provider

## 2022-03-15 NOTE — DISCHARGE INSTRUCTIONS
Percutaneous Liver Biopsy   WHAT YOU NEED TO KNOW:   A PLB is a procedure to remove a sample of tissue from your liver  The sample can be sent to a lab and tested for liver disease, cancer, or infection  After the procedure you may have pain and bruising at the biopsy site  You may also have pain in your right shoulder  These symptoms should get better in 48 to 72 hours  DISCHARGE INSTRUCTIONS:     [facility] patients,  Contact Interventional Radiology at [phone] PATIENTS: Contact Interventional Radiology at [phone]   [facility] PATIENTS: Contact Interventional Radiology at [phone] if:    · Fever greater than 101 or chills  · You have severe pain in your abdomen  · Your abdomen is larger than usual and feels hard  · Your neck is more swollen and you have trouble swallowing  · You feel weak or dizzy  · Your heart is beating faster than usual    · Your pain does not get better after you take pain medicine  · Your wound is red, swollen, or draining pus  · You have nausea or are vomiting  · Your skin is itchy, swollen, or you have a rash  · You have questions or concerns about your condition or care  Medicines:   · Acetaminophen decreases pain and fever  It is available without a doctor's order  Acetaminophen can cause liver damage if not taken correctly  · Take your home medicine as directed  · Resume your normal diet  Small sips of flat soda will help with mild nausea  Self-care:   · Rest as directed  Do not play sports, exercise, or lift anything heavier than 5 pounds for up to 1 week  · Apply firm, steady pressure if bleeding occurs  A small amount of bleeding from your wound is possible  Apply pressure with a clean gauze or towel for 5 to 10 minutes  Call 911 if bleeding becomes heavy or does not stop  · Ask your healthcare provider when to take your blood thinner or antiplatelet medicine   You may need to wait 24 to 72 hours to take your medicine  This will prevent bleeding  Follow up with your healthcare provider as directed: Write down your questions so you remember to ask them during your visits  Kidney Tumor Ablation     WHAT YOU NEED TO KNOW:                                                                   Kidney tumor ablation is a procedure to destroy cancer cells in your kidney without removing them  Using image guidance, a probe is inserted into the tumor  Ablations can be done using high energy radio waves (RFA)  Or using microwave energy  Heat destroys the abnormal tissue  A cyroablation destroys abnormal tissue by using cold temperatures  DISCHARGE INSTRUCTIONS:         You may resume your normal diet and medications  Small sips of flat soda will help with nausea  Follow up with your healthcare provider as directed: Write down your questions so you remember to ask them during your visits  Rest as needed: Slowly start to do more each day  Return to your daily activities as directed by your healthcare provider  Contact your healthcare provider if:  [facility]  Patients Contact Interventional  Radiology at [phone] PATIENTS: Contact Interventional Radiology at [phone])      [facility] PATIENTS: Contact Interventional Radiology at [phone]) if any of the following occur: You have severe pain that does not get better with medicine  Difficulty breathing, nausea or vomiting  Chills or fever above 101 degrees F  Develop any redness, swelling, heat, unusual drainage, heavy bruising or bleeding from the probe sites  You continue to have pain a week after your procedure        The above information is an  only  It is not intended as medical advice for individual conditions or treatments   Talk to your doctor, nurse or pharmacist before following any medical regimen to see if it is safe and effective for you

## 2022-03-16 ENCOUNTER — TELEPHONE (OUTPATIENT)
Dept: OTHER | Facility: HOSPITAL | Age: 57
End: 2022-03-16

## 2022-03-16 DIAGNOSIS — K74.3 PRIMARY BILIARY CHOLANGITIS (HCC): Primary | ICD-10-CM

## 2022-03-16 RX ORDER — URSODIOL 300 MG/1
300 CAPSULE ORAL 2 TIMES DAILY
Qty: 60 CAPSULE | Refills: 11 | Status: SHIPPED | OUTPATIENT
Start: 2022-03-16 | End: 2022-04-11 | Stop reason: SDUPTHER

## 2022-03-16 NOTE — TELEPHONE ENCOUNTER
Attempted to call patient to review diagnosis of PBC on liver biopsy and discuss starting ursodiol  Will call again later

## 2022-03-17 DIAGNOSIS — K59.09 CHRONIC CONSTIPATION: ICD-10-CM

## 2022-03-17 DIAGNOSIS — K21.9 GASTROESOPHAGEAL REFLUX DISEASE WITHOUT ESOPHAGITIS: ICD-10-CM

## 2022-03-17 RX ORDER — PANTOPRAZOLE SODIUM 40 MG/1
40 TABLET, DELAYED RELEASE ORAL DAILY
Qty: 30 TABLET | Refills: 0 | Status: SHIPPED | OUTPATIENT
Start: 2022-03-17 | End: 2022-03-18 | Stop reason: SDUPTHER

## 2022-03-17 NOTE — TELEPHONE ENCOUNTER
Reviewed pathology results with patient  Will start ursodiol for treatment of PBC  She will start 1 pill daily for 7 days and then increase to 1 pill twice a day  Follow up with me scheduled for April and will likely uptitrate ursodiol then

## 2022-03-18 ENCOUNTER — TELEPHONE (OUTPATIENT)
Dept: OTHER | Facility: OTHER | Age: 57
End: 2022-03-18

## 2022-03-18 ENCOUNTER — TELEPHONE (OUTPATIENT)
Dept: UROLOGY | Facility: CLINIC | Age: 57
End: 2022-03-18

## 2022-03-18 DIAGNOSIS — K21.9 GASTROESOPHAGEAL REFLUX DISEASE WITHOUT ESOPHAGITIS: ICD-10-CM

## 2022-03-18 DIAGNOSIS — K59.09 CHRONIC CONSTIPATION: ICD-10-CM

## 2022-03-18 RX ORDER — PANTOPRAZOLE SODIUM 40 MG/1
40 TABLET, DELAYED RELEASE ORAL DAILY
Qty: 14 TABLET | Refills: 0 | Status: SHIPPED | OUTPATIENT
Start: 2022-03-18 | End: 2022-03-28 | Stop reason: SDUPTHER

## 2022-03-18 NOTE — TELEPHONE ENCOUNTER
Patient called back stating that she had it completed at [facility], She saw Dr Elvis Spann w/ [address] [facility] Gynecologic Oncology - Ph#: [phone]

## 2022-03-18 NOTE — TELEPHONE ENCOUNTER
Marge Mares (Self) [phone] (H)     Is requesting a call regarding the:     Status Priority Source   Preliminary result (3/18/2022 11:07 AM) Routine Kidney, Right     Tissue Exam (Order [id number])

## 2022-03-18 NOTE — TELEPHONE ENCOUNTER
Pathology is requesting that we track down patient's outside cervical cancer pathology report so that her renal mass biopsy can be compared  Can we contact the patient to find out where her cervical cancer was diagnosed and how we can obtain a copy of this pathology?

## 2022-03-21 NOTE — TELEPHONE ENCOUNTER
I called [facility] Gyn/Onc at [phone] and spoke to Robert Ji, she stated the her surgery for cervical cancer was back on 11/21/06  She directed me to call [facility] at [phone]  Called [facility] and they are not open until 8:30    Will try again to request

## 2022-03-21 NOTE — TELEPHONE ENCOUNTER
Pts daughter called in today to let us know she called Pathology at [facility] today and that they were in the process of sending the slide over to Yohannes Merritt

## 2022-03-24 NOTE — PROGRESS NOTES
UROLOGY FOLLOWUP NOTE     CHIEF COMPLAINT   Everett Garcia is a 55 y o  female with a complaint of   Chief Complaint   Patient presents with    Follow-up       History of Present Illness:     55 y o  female, with a personal history of cervical cancer and a family history of kidney cancer presents after recent CT and MRI demonstrated a small renal tumor on the right side  Patient has a history of cervical cancer status post hysterectomy, she then developed recurrence in the ovary and return to the operating room for oophorectomy  Patient has a history of perforated bowel likely related to postsurgical adhesions that required exploratory laparotomy  She does report a family history of kidney cancer in her sister who had her kidney removed and in her uncle  She presents with her daughter for discussion who is a nurse practitioner at [employer]  Patient presents today after her biopsy and cryoablation  No major issues with the procedure  Pathology was obtained from [facility] from her prior cervical procedure and compared  Presents today for discussion    Past Medical History:     Past Medical History:   Diagnosis Date    [address] [address]     Depression     GERD (gastroesophageal reflux disease)     Migraine     Renal cell carcinoma of right kidney ([facility] [address] ) 3/25/2022       PAST SURGICAL HISTORY:     Past Surgical History:   Procedure Laterality Date    HYSTERECTOMY      IR BIOPSY LIVER RANDOM  3/15/2022    IR CRYOABLATION  3/15/2022    LAPAROSCOPIC SMALL BOWEL RESECTION      WRIST SURGERY Right        CURRENT MEDICATIONS:     Current Outpatient Medications   Medication Sig Dispense Refill    Biotin 1 MG CAPS Take 1 capsule (1 mg total) by mouth daily      cholecalciferol (VITAMIN D3) 1,000 units tablet Take 1 tablet (1,000 Units total) by mouth daily      EMGALITY 120 MG/ML SOSY INJECT 2 SYRINGES (240MG) AS A LOADING DOSE MONTH ONE     THEN 1 S   (REFER TO PRESCRIPTION NOTES)   escitalopram (Lexapro) 10 mg tablet Take 1 tablet (10 mg total) by mouth daily 90 tablet 1    ketorolac (TORADOL) 10 mg tablet take 1 tablet by mouth every 6 hours if needed for MODERATE PAIN      (REFER TO PRESCRIPTION NOTES)        ketorolac (TORADOL) 30 mg/mL injection ketorolac 30 mg/mL (1 mL) injection solution      LORazepam (Ativan) 0 5 mg tablet Take 1 tablet (0 5 mg total) by mouth 2 (two) times a day 60 tablet 1    lubiprostone (Amitiza) 24 mcg capsule Take 1 capsule (24 mcg total) by mouth 2 (two) times a day with meals 24 capsule 0    pantoprazole (PROTONIX) 40 mg tablet Take 1 tablet (40 mg total) by mouth daily 14 tablet 0    rosuvastatin (CRESTOR) 10 MG tablet TAKE 1 TABLET DAILY 90 tablet 3    ursodiol (ACTIGALL) 300 mg capsule Take 1 capsule (300 mg total) by mouth 2 (two) times a day 60 capsule 11    ondansetron (ZOFRAN) 4 mg tablet Take 1 tablet (4 mg total) by mouth every 6 (six) hours 12 tablet 0     Current Facility-Administered Medications   Medication Dose Route Frequency Provider Last Rate Last Admin    ondansetron (ZOFRAN-ODT) dispersible tablet 4 mg  4 mg Oral Q6H PRN RUPINDER Villasenor   4 mg at 11/17/18 1717       ALLERGIES:   No Known Allergies    SOCIAL HISTORY:     Social History     Socioeconomic History    Marital status: /Civil Union     Spouse name: None    Number of children: None    Years of education: None    Highest education level: None   Occupational History    Occupation:    Tobacco Use    Smoking status: Never Smoker    Smokeless tobacco: Never Used   Vaping Use    Vaping Use: Never used   Substance and Sexual Activity    Alcohol use: Yes     Comment: socially    Drug use: No    Sexual activity: None   Other Topics Concern    None   Social History Narrative    None     Social Determinants of Health     Financial Resource Strain: Not on file   Food Insecurity: Not on file   Transportation Needs: Not on file   Physical Activity: Not on file   Stress: Not on file   Social Connections: Not on file   Intimate Partner Violence: Not on file   Housing Stability: Not on file       SOCIAL HISTORY:     Family History   Problem Relation Age of Onset    No Known Problems Mother     No Known Problems Father     Esophageal cancer Neg Hx        REVIEW OF SYSTEMS:     Review of Systems   Constitutional: Negative  Respiratory: Negative  Cardiovascular: Negative  Gastrointestinal: Negative  Genitourinary: Negative  Negative for flank pain and hematuria  Musculoskeletal: Negative  Skin: Negative  Psychiatric/Behavioral: The patient is nervous/anxious  PHYSICAL EXAM:     /76 (BP Location: Left arm, Patient Position: Sitting, Cuff Size: Adult)   Ht 5' 11" (1 803 m)   Wt 63 kg (139 lb)   BMI 19 39 kg/m²     General:  Healthy appearing female in no acute distress  They have a normal affect  There is not appear to be any gross neurologic defects or abnormalities  HEENT:  Normocephalic, atraumatic  Neck is supple without any palpable lymphadenopathy  Cardiovascular:  Patient has normal palpable distal radial pulses  There is no significant peripheral edema  No JVD is noted  Respiratory:  Patient has unlabored respirations  There is no audible wheeze or rhonchi  Abdomen:  Abdomen is with healed surgical scars  Abdomen is soft and nontender  There is no tympany  Inguinal and umbilical hernia are not appreciated  Musculoskeletal:  Patient does not have significant CVA tenderness in the  flank with palpation or percussion  They full range of motion in all 4 extremities  Strength in all 4 extremities appears congruent  Patient is able to ambulate without assistance or difficulty  Dermatologic:  Patient has no skin abnormalities or rashes        LABS:     CBC:   Lab Results   Component Value Date    WBC 3 83 (L) 03/15/2022    HGB 13 0 03/15/2022    HCT 38 9 03/15/2022    MCV 89 03/15/2022     03/15/2022       BMP:   Lab Results   Component Value Date    GLUCOSE 91 11/04/2014    CALCIUM 9 8 01/11/2022     11/04/2014    K 3 6 01/11/2022    CO2 31 01/11/2022     01/11/2022    BUN 17 01/11/2022    CREATININE 0 77 01/11/2022       IMAGING:     3/15/22  PROCEDURE: Image-guided cryoablation     Procedural Personnel  Attending physician(s): [facility]     Pre-procedure diagnosis: Renal cell carcinoma  Post-procedure diagnosis: Same  Indication: Patient with incidentally found superior pole right kidney renal cell carcinoma presents for cryoablation      PROCEDURE SUMMARY:  - Image-guided cryoablation of superior pole right kidney mass  - Core needle biopsy of superior pole right kidney mass     PROCEDURE DETAILS:     Pre-procedure  Consent: Informed consent for the procedure including risks, benefits and alternatives was obtained and time-out was performed prior to the procedure  Preparation: The site was prepared and draped using maximal sterile barrier technique including cutaneous antisepsis      Anesthesia/sedation  Level of anesthesia/sedation: General anesthesia  Anesthesia/sedation administered by: Anesthesiology  Total intra-service sedation time (minutes): 120     Imaging prior to intervention  The patient was positioned prone  Initial imaging was performed  Imaging modality: Noncontrast CT  Target #1:  - Maximal diameter (cm): 1 5  - Location: Superior pole right kidney     Biopsy  Under CT guidance, the biopsy needle was advanced and positioned at the target  Target lesion: Target 1 above  Previous biopsy of same target (QCDR): No  - Biopsy type: Core needle  - Core needle biopsy device: Argon BioPince  - Core needle size: 18 gauge     Cryoablation  Under CT guidance, the ablation applicator(s) were advanced and positioned within the target(s)  For each target lesion the applicators were placed and repositioned as necessary to achieve the desired ablation zone     Ablation applicator: BTG IcePearl 2 1  Target #1:  - Therapeutic intent (QCDR): Curative  - Number of applicators: 1  Freeze Cycle 1:   - Freeze duration (minutes): 10   - Maximum intensity applied (percent): 100   Thaw Cycle 1:  - Thaw duration (minutes): 8  - Thaw cycle type: Passive thaw  Freeze Cycle 2:   - Freeze duration (minutes): 10    - Maximum intensity applied (percent): 100  Thaw Cycle 2:  - Thaw duration (minutes): 4  - Thaw cycle type: Passive thaw     Applicator removal  The ablation applicator(s) were removed and sterile bandages were applied      Imaging following ablation  Post-ablation imaging: Noncontrast CT  Post-ablation imaging findings: Small perilesional hemorrhage, likely status post biopsy  No pneumothorax      Radiation Dose  CT dose length product (mGy-cm): 1587 81      Additional Details  Specimens removed: 1 core needle sample of superior pole right renal mass placed into formalin  Estimated blood loss (mL): 1  Complications: No immediate complications      IMPRESSION:     Percutaneous cryoablation of superior pole right renal mass with biopsy      Plan:      - 4 hours bed rest status post ablation   - Follow up with urology  1/29/22  MRI OF THE ABDOMEN WITH AND WITHOUT CONTRAST WITH MRCP     INDICATION:  Abnormal liver tests, positive AMA and ASMA     COMPARISON: CT and ultrasound of the abdomen dated 1/11/2022     TECHNIQUE:  The following pulse sequences were obtained:  Coronal and axial T2 with TE of 90 and 180 respectively, axial T2 with fat saturation, axial FIESTA fat-sat, axial T1-weighted in-and-out-of phase, axial DWI/ADC, pre-contrast axial T1 with fat   saturation, post-contrast dynamic axial T1 with fat saturation at 20, 70, and 180 seconds, followed by coronal and 7 minute delayed axial T1 with fat saturation  3D MRCP images were obtained with radial thick slabs and projections    3D rendering was   performed from the acquisition scanner          IV Contrast:  6 mL of Gadobutrol injection (SINGLE-DOSE)      FINDINGS:     LOWER CHEST:   Unremarkable      LIVER:   Normal in size and configuration  There are a couple subcentimeter T2 hyperintense nonenhancing left hepatic lobe lesions consistent with hepatic cysts  There are no suspicious enhancing hepatic masses  The hepatic veins and portal veins are patent         BILE DUCTS:  No intrahepatic or extrahepatic bile duct dilation  Common bile duct is normal in caliber  No choledocholithiasis, biliary stricture or suspicious mass       GALLBLADDER:  Normal      PANCREAS:  Unremarkable      ADRENAL GLANDS:  Normal      SPLEEN:  Normal      KIDNEYS/PROXIMAL URETERS:  No hydroureteronephrosis  On image 235 series 12, there is a 1 3 cm complex right renal lesion which appears to exhibit internal enhancement as compared to precontrast imaging, is mildly T2 hyperintense relative to right renal   parenchyma and is hypointense to isointense to the right kidney on T1-weighted imaging  The constellation of findings are suspicious for enhancing renal neoplasm      BOWEL:   No dilated loops of bowel       PERITONEUM/RETROPERITONEUM:  No ascites      LYMPH NODES:  No abdominal lymphadenopathy      VASCULAR STRUCTURES:  No aneurysm      ABDOMINAL WALL:  Unremarkable      OSSEOUS STRUCTURES:  Degenerative changes are noted involving the spine      IMPRESSION:     1   1 3 cm complex right renal mass with findings suspicious for internal enhancement  Findings are suspicious for neoplasm, see above for details      2  The etiology of the patient is clinical symptomatology with abnormal liver tests is not definitively identified on MRI  There is a uniform appearance to the liver without abnormal enhancement or nodular contour  The biliary system is within normal   limits  If there is clinical concern for radiographically occult osseous involving the liver, consider further evaluation tissue sampling as clinically indicated          ASSESSMENT:     3/15/22  Final Diagnosis    A  Kidney, Right, mass / during cryoablation:  - Epithelial neoplasm with clear cell features, favor renal cell carcinoma, nuclear Grade 3, unclassifiable  - See note  ASSESSMENT:     55 y o  female with small endophytic right posterior renal mass now status post interventional radiology biopsy and cryoablation    PLAN:     Patient has a clear cell renal cell carcinoma, clinical stage T1a  This is based on size of the renal lesion <4cm  Patient had some questions about the Yadi grading reported in the pathology which is a level 3, we discussed that this refers to cellular features  Patient also had some questions regarding her prior history of cervical cancer  To the best the pathologist ability, immunohistochemistry demonstrates that this is in fact clear cell renal cell carcinoma and is not consistent with her prior history of cervical cancer  Cryoablation was applied as definitive treatment for the patient's small renal mass  We opted against surgery given prior surgical interventions and comorbidities  We discussed today that I am hopeful the cryoablation is a curative therapy  Surveillance will be applied based on the [employer] guidelines  Will repeat imaging in 3 months time  Patient is concerned about potential recurrence  Should we ever see recurrent enhancement features or suspicious nodules, 2nd round of cryoablation could be applied as previously discussed  Alternatively, radical nephrectomy could be offered  I am hopeful that these discussions will not need to be entertained however careful surveillance over the next 5-7 years per guidelines will occur  I have done my best to reassure the patient today  She has requested I contact her daughter which I will try to do sometime within the next week for discussion  I also offered the patient a discussion of genetic counseling given her 2 malignant history  At this point time, she defers

## 2022-03-25 ENCOUNTER — TELEPHONE (OUTPATIENT)
Dept: OTHER | Facility: OTHER | Age: 57
End: 2022-03-25

## 2022-03-25 ENCOUNTER — OFFICE VISIT (OUTPATIENT)
Dept: UROLOGY | Facility: CLINIC | Age: 57
End: 2022-03-25
Payer: COMMERCIAL

## 2022-03-25 VITALS
HEIGHT: 71 IN | DIASTOLIC BLOOD PRESSURE: 76 MMHG | WEIGHT: 139 LBS | BODY MASS INDEX: 19.46 KG/M2 | SYSTOLIC BLOOD PRESSURE: 112 MMHG

## 2022-03-25 DIAGNOSIS — C64.1 RENAL CELL CARCINOMA OF RIGHT KIDNEY (HCC): Primary | ICD-10-CM

## 2022-03-25 PROCEDURE — 99213 OFFICE O/P EST LOW 20 MIN: CPT | Performed by: UROLOGY

## 2022-03-25 PROCEDURE — 1036F TOBACCO NON-USER: CPT | Performed by: UROLOGY

## 2022-03-25 PROCEDURE — 3008F BODY MASS INDEX DOCD: CPT | Performed by: UROLOGY

## 2022-03-28 DIAGNOSIS — K59.09 CHRONIC CONSTIPATION: ICD-10-CM

## 2022-03-28 DIAGNOSIS — K21.9 GASTROESOPHAGEAL REFLUX DISEASE WITHOUT ESOPHAGITIS: ICD-10-CM

## 2022-03-28 RX ORDER — PANTOPRAZOLE SODIUM 40 MG/1
40 TABLET, DELAYED RELEASE ORAL DAILY
Qty: 90 TABLET | Refills: 0 | Status: SHIPPED | OUTPATIENT
Start: 2022-03-28 | End: 2022-06-23

## 2022-04-03 DIAGNOSIS — F32.A DEPRESSION, UNSPECIFIED DEPRESSION TYPE: ICD-10-CM

## 2022-04-04 RX ORDER — ESCITALOPRAM OXALATE 20 MG/1
TABLET ORAL
Qty: 90 TABLET | Refills: 1 | Status: SHIPPED | OUTPATIENT
Start: 2022-04-04

## 2022-04-08 ENCOUNTER — OFFICE VISIT (OUTPATIENT)
Dept: GASTROENTEROLOGY | Facility: CLINIC | Age: 57
End: 2022-04-08
Payer: COMMERCIAL

## 2022-04-08 ENCOUNTER — APPOINTMENT (OUTPATIENT)
Dept: LAB | Facility: CLINIC | Age: 57
End: 2022-04-08
Payer: COMMERCIAL

## 2022-04-08 VITALS
HEIGHT: 71 IN | SYSTOLIC BLOOD PRESSURE: 110 MMHG | OXYGEN SATURATION: 99 % | RESPIRATION RATE: 14 BRPM | DIASTOLIC BLOOD PRESSURE: 70 MMHG | BODY MASS INDEX: 20.16 KG/M2 | HEART RATE: 66 BPM | TEMPERATURE: 98.5 F | WEIGHT: 144 LBS

## 2022-04-08 DIAGNOSIS — C64.1 RENAL CELL CARCINOMA OF RIGHT KIDNEY (HCC): ICD-10-CM

## 2022-04-08 DIAGNOSIS — R79.89 ABNORMAL LFTS: ICD-10-CM

## 2022-04-08 DIAGNOSIS — K74.3 PRIMARY BILIARY CHOLANGITIS (HCC): Primary | ICD-10-CM

## 2022-04-08 DIAGNOSIS — R10.13 EPIGASTRIC PAIN: ICD-10-CM

## 2022-04-08 DIAGNOSIS — K74.3 PRIMARY BILIARY CHOLANGITIS (HCC): ICD-10-CM

## 2022-04-08 LAB
ALBUMIN SERPL BCP-MCNC: 4.1 G/DL (ref 3.5–5)
ALP SERPL-CCNC: 103 U/L (ref 46–116)
ALT SERPL W P-5'-P-CCNC: 30 U/L (ref 12–78)
ANION GAP SERPL CALCULATED.3IONS-SCNC: -1 MMOL/L (ref 4–13)
AST SERPL W P-5'-P-CCNC: 19 U/L (ref 5–45)
BILIRUB SERPL-MCNC: 0.27 MG/DL (ref 0.2–1)
BUN SERPL-MCNC: 17 MG/DL (ref 5–25)
CALCIUM SERPL-MCNC: 10.2 MG/DL (ref 8.3–10.1)
CHLORIDE SERPL-SCNC: 105 MMOL/L (ref 100–108)
CO2 SERPL-SCNC: 33 MMOL/L (ref 21–32)
CREAT SERPL-MCNC: 0.97 MG/DL (ref 0.6–1.3)
GFR SERPL CREATININE-BSD FRML MDRD: 65 ML/MIN/1.73SQ M
GLUCOSE P FAST SERPL-MCNC: 81 MG/DL (ref 65–99)
POTASSIUM SERPL-SCNC: 4.3 MMOL/L (ref 3.5–5.3)
PROT SERPL-MCNC: 7.6 G/DL (ref 6.4–8.2)
SODIUM SERPL-SCNC: 137 MMOL/L (ref 136–145)

## 2022-04-08 PROCEDURE — 99214 OFFICE O/P EST MOD 30 MIN: CPT | Performed by: STUDENT IN AN ORGANIZED HEALTH CARE EDUCATION/TRAINING PROGRAM

## 2022-04-08 PROCEDURE — 3008F BODY MASS INDEX DOCD: CPT | Performed by: STUDENT IN AN ORGANIZED HEALTH CARE EDUCATION/TRAINING PROGRAM

## 2022-04-08 PROCEDURE — 36415 COLL VENOUS BLD VENIPUNCTURE: CPT

## 2022-04-08 PROCEDURE — 1036F TOBACCO NON-USER: CPT | Performed by: STUDENT IN AN ORGANIZED HEALTH CARE EDUCATION/TRAINING PROGRAM

## 2022-04-08 PROCEDURE — 80053 COMPREHEN METABOLIC PANEL: CPT

## 2022-04-08 RX ORDER — BUPROPION HYDROCHLORIDE 150 MG/1
150 TABLET ORAL DAILY
COMMUNITY
End: 2022-06-29

## 2022-04-08 NOTE — PROGRESS NOTES
[facility] - Outpatient Follow-up Note  Feliciano Hogan 55 y o  female MRN: [id number]  Encounter: [id number]          ASSESSMENT AND PLAN:    56F with PBC without cirrhosis, cervical cancer, renal cancer, depression, constipation here for follow up of new diagnosis of PBC  AMA+ ASMA+  Biopsy 3/15/22 c/w PBC with no significant fibrosis  Taking ursodiol 600 mg daily  Previously referred for epigastric pain which has resolved on pantoprazole  EGD with gastric biopsy normal 2/2/22  1  Primary biliary cholangitis (HCC)  - Comprehensive metabolic panel; Future  - Pending CMP, will adjust ursodiol dosing  Script needs to be sent to mail order   - Counseled to abstain from alcohol  - Follow up with PCP for Hep B vaccination, has evidence of prior Hep A exposure    2  Epigastric pain  Resolved  Continue pantoprazole    History of colon polyps: 2 small TAs 2021, repeat 2028    RTC in 6 months    ______________________________________________________________________    SUBJECTIVE:    Taking ursodiol 600 mg twice a day  No problems with the medication  Doesn't feel any different on medication  Has not had epigstric pain since starting protonix    Biopsy of renal mass showing clear cell carcinoma  Will be getting regular cross sectional to surveil post ablation    REVIEW OF SYSTEMS IS OTHERWISE NEGATIVE        Historical Information   Past Medical History:   Diagnosis Date    [facility] [address]     Depression     GERD (gastroesophageal reflux disease)     Migraine     Renal cell carcinoma of right kidney ([facility] [address] ) 3/25/2022     Past Surgical History:   Procedure Laterality Date    HYSTERECTOMY      IR BIOPSY LIVER RANDOM  3/15/2022    IR CRYOABLATION  3/15/2022    LAPAROSCOPIC SMALL BOWEL RESECTION      WRIST SURGERY Right      Social History   Social History     Substance and Sexual Activity   Alcohol Use Yes    Comment: socially     Social History     Substance and Sexual Activity Drug Use No     Social History     Tobacco Use   Smoking Status Never Smoker   Smokeless Tobacco Never Used     Family History   Problem Relation Age of Onset    No Known Problems Mother     No Known Problems Father     Esophageal cancer Neg Hx        Meds/Allergies       Current Outpatient Medications:     Biotin 1 MG CAPS    cholecalciferol (VITAMIN D3) 1,000 units tablet    EMGALITY 120 MG/ML SOSY    escitalopram (Lexapro) 20 mg tablet    ketorolac (TORADOL) 10 mg tablet    ketorolac (TORADOL) 30 mg/mL injection    LORazepam (Ativan) 0 5 mg tablet    lubiprostone (Amitiza) 24 mcg capsule    pantoprazole (PROTONIX) 40 mg tablet    rosuvastatin (CRESTOR) 10 MG tablet    ursodiol (ACTIGALL) 300 mg capsule    ondansetron (ZOFRAN) 4 mg tablet    Current Facility-Administered Medications:     ondansetron (ZOFRAN-ODT) dispersible tablet 4 mg, 4 mg, Oral, Q6H PRN, 4 mg at 11/17/18 1717    No Known Allergies        Objective     Blood pressure 110/70, pulse 66, temperature 98 5 °F (36 9 °C), temperature source Tympanic, resp  rate 14, height 5' 11" (1 803 m), weight 65 3 kg (144 lb), SpO2 99 %  Body mass index is 20 08 kg/m²  PHYSICAL EXAM:      General Appearance:   Alert, cooperative, no distress   HEENT:   Normocephalic, atraumatic, anicteric  Neck:  Supple, symmetrical, trachea midline   Lungs:   Clear to auscultation bilaterally; no rales, rhonchi or wheezing; respirations unlabored    Heart[de-identified]   Regular rate and rhythm; no murmur, rub, or gallop  Abdomen:   Soft, non-tender, non-distended; normal bowel sounds; no masses, no organomegaly    Genitalia:   Deferred    Rectal:   Deferred    Extremities:  No cyanosis, clubbing or edema    Pulses:  2+ and symmetric    Skin:  No jaundice, rashes, or lesions    Lymph nodes:  No palpable cervical lymphadenopathy        Lab Results:   No visits with results within 1 Day(s) from this visit     Latest known visit with results is:   Lab Requisition on 03/29/2022   Component Date Value    Case Report 03/29/2022                      Value:Surgical Pathology Report                         Case: [id number]                                   Authorizing Provider:  Marquis Refugio MD   Collected:           03/29/2022 0753              Ordering Location:     [address] [place] [address]      Received:            03/29/2022 [facility]                                                                                  Laboratory                                                                   Pathologist:           Blanca West MD                                                     Specimens:   A) - Cervix, Endocervical, Endocervix Curettage, Cervix 1 O'Clock Biopsy ( 5 slides                 [id number] [facility] Medicine, collected 06/20/2006)                                        B) - Cervix, Cervix 5 O'Clock, Cervix 7 O'Clock, Cervix 11 O'Clock Biopsy ( 7 slides                [id number] [facility] Medicine, collected 7/5/2006)                                                                    C) - Endometrium, Endometrium Curettings, Cervix cone biopsies (  16 slides [id number]               [facility], collected 07/14/2006)                                                  D) - Cervix, Right ovarina mass tube, Ovary fallopian tube left,Vaginal cuff, Lymph                 nodes right left pelvic obturator,periaortic, Omentum,Appendix,Paracolic gutter, Left               Biopsy,Bladder( 61 slides [address] [facility], Collected                          12/14/2009)                                                                                Final Diagnosis 03/29/2022                      Value: This result contains rich text formatting which cannot be displayed here   Additional Information 03/29/2022                      Value: This result contains rich text formatting which cannot be displayed here  Julieann Frankel Gross Description 03/29/2022                      Value: This result contains rich text formatting which cannot be displayed here   Clinical Information 03/29/2022                      Value:C  Adenocarcinoma in situ of cervix  D  Pelvic mass  Radiology Results:   IR CRYOABLATION    Result Date: 3/15/2022  Narrative: PROCEDURE: Image-guided cryoablation Procedural Personnel Attending physician(s): [facility] Pre-procedure diagnosis: Renal cell carcinoma Post-procedure diagnosis: Same Indication: Patient with incidentally found superior pole right kidney renal cell carcinoma presents for cryoablation  PROCEDURE SUMMARY: - Image-guided cryoablation of superior pole right kidney mass - Core needle biopsy of superior pole right kidney mass PROCEDURE DETAILS: Pre-procedure Consent: Informed consent for the procedure including risks, benefits and alternatives was obtained and time-out was performed prior to the procedure  Preparation: The site was prepared and draped using maximal sterile barrier technique including cutaneous antisepsis  Anesthesia/sedation Level of anesthesia/sedation: General anesthesia Anesthesia/sedation administered by: Anesthesiology Total intra-service sedation time (minutes): 120 Imaging prior to intervention The patient was positioned prone  Initial imaging was performed  Imaging modality: Noncontrast CT Target #1: - Maximal diameter (cm): 1 5 - Location: Superior pole right kidney Biopsy Under CT guidance, the biopsy needle was advanced and positioned at the target  Target lesion: Target 1 above Previous biopsy of same target (QCDR): No - Biopsy type: Core needle - Core needle biopsy device: Argon BioPince - Core needle size: 18 gauge Cryoablation Under CT guidance, the ablation applicator(s) were advanced and positioned within the target(s)   For each target lesion the applicators were placed and repositioned as necessary to achieve the desired ablation zone  Ablation applicator: BTG IcePearl 2 1 Target #1: - Therapeutic intent (QCDR): Curative - Number of applicators: 1 Freeze Cycle 1: - Freeze duration (minutes): 10  - Maximum intensity applied (percent): 100 Thaw Cycle 1: - Thaw duration (minutes): 8 - Thaw cycle type: Passive thaw Freeze Cycle 2: - Freeze duration (minutes): 10  - Maximum intensity applied (percent): 100 Thaw Cycle 2: - Thaw duration (minutes): 4 - Thaw cycle type: Passive thaw Applicator removal The ablation applicator(s) were removed and sterile bandages were applied  Imaging following ablation Post-ablation imaging: Noncontrast CT Post-ablation imaging findings: Small perilesional hemorrhage, likely status post biopsy  No pneumothorax  Radiation Dose CT dose length product (mGy-cm): 1587 81 Additional Details Specimens removed: 1 core needle sample of superior pole right renal mass placed into formalin  Estimated blood loss (mL): 1 Complications: No immediate complications  Impression: Percutaneous cryoablation of superior pole right renal mass with biopsy  Plan: - 4 hours bed rest status post ablation  - Follow up with urology  Workstation performed: [id number]     IR biopsy liver random    Result Date: 3/15/2022  Narrative: PROCEDURE: Ultrasound-guided liver biopsy Procedural Personnel Attending physician(s): Dr Maciel Monique Pre-procedure diagnosis: Elevated LFTs Post-procedure diagnosis: Same Indication: Patient with elevated LFTs presents for liver biopsy  PROCEDURE SUMMARY: - Percutaneous US-guided liver biopsy PROCEDURE DETAILS: Pre-procedure Consent: Informed consent for the procedure including risks, benefits and alternatives was obtained and time-out was performed prior to the procedure  Preparation: The site was prepared and draped using maximal sterile barrier technique including cutaneous antisepsis   Anesthesia/sedation Level of anesthesia/sedation: Moderate sedation (conscious sedation) Anesthesia/sedation administered by: IR nurse under attending supervision with continuous monitoring of the patient's level of consciousness and physiologic status Total intra-service sedation time (minutes): 30 Biopsy Local anesthesia was administered  Under US guidance, a 17-gauge coaxial introducer needle was advanced into the right liver parenchyma  18-gauge BioPince needle was advanced through the introducer was used to obtain 4 core needle samples which were placed into formalin  D-Stat hemostatic agent was instilled through the needle during its removal   Postprocedure ultrasound showed no hematoma  Bandage was applied  Additional Details Specimens removed: 4 core needle samples placed in formalin  Estimated blood loss (mL): 5 Complications: No immediate complications  Impression: Ultrasound-guided nontargeted biopsy of right liver  Plan: Specimen(s) sent for evaluation  Workstation performed: [id number]     Answers for HPI/ROS submitted by the patient on 4/1/2022  Chronicity: recurrent  Onset: more than 1 month ago  Onset quality: gradual  Frequency: rarely  Episode duration: 1 hours  Progression since onset: rapidly improving  Pain location: epigastric region  Pain - numeric: 0/10  Pain quality: a sensation of fullness  Radiates to: does not radiate  anorexia: No  arthralgias: No  belching: Yes  constipation: No  diarrhea: No  dysuria: No  fever: No  flatus: No  frequency: No  headaches: No  hematochezia: No  hematuria: No  melena: No  myalgias: No  nausea:  No  weight loss: No  vomiting: No  Aggravated by: nothing  Relieved by: belching

## 2022-04-11 DIAGNOSIS — K74.3 PRIMARY BILIARY CHOLANGITIS (HCC): ICD-10-CM

## 2022-04-11 RX ORDER — URSODIOL 300 MG/1
300 CAPSULE ORAL 2 TIMES DAILY
Qty: 180 CAPSULE | Refills: 3 | Status: SHIPPED | OUTPATIENT
Start: 2022-04-11

## 2022-06-02 DIAGNOSIS — E78.5 HYPERLIPIDEMIA, UNSPECIFIED HYPERLIPIDEMIA TYPE: ICD-10-CM

## 2022-06-02 RX ORDER — ROSUVASTATIN CALCIUM 10 MG/1
TABLET, COATED ORAL
Qty: 90 TABLET | Refills: 3 | Status: SHIPPED | OUTPATIENT
Start: 2022-06-02

## 2022-06-04 ENCOUNTER — HOSPITAL ENCOUNTER (OUTPATIENT)
Dept: MRI IMAGING | Facility: HOSPITAL | Age: 57
Discharge: HOME | End: 2022-06-04
Attending: UROLOGY
Payer: COMMERCIAL

## 2022-06-04 DIAGNOSIS — C64.1 RENAL CELL CARCINOMA OF RIGHT KIDNEY (HCC): ICD-10-CM

## 2022-06-04 PROCEDURE — A9585 GADOBUTROL INJECTION: HCPCS | Performed by: UROLOGY

## 2022-06-04 PROCEDURE — 74183 MRI ABD W/O CNTR FLWD CNTR: CPT

## 2022-06-04 PROCEDURE — G1004 CDSM NDSC: HCPCS

## 2022-06-04 RX ADMIN — GADOBUTROL 6 ML: 604.72 INJECTION INTRAVENOUS at 11:13

## 2022-06-10 ENCOUNTER — TELEPHONE (OUTPATIENT)
Dept: UROLOGY | Facility: CLINIC | Age: 57
End: 2022-06-10

## 2022-06-10 NOTE — TELEPHONE ENCOUNTER
Returned call to patient and made her aware of MRI results and plan to follow up with Dr Markus Conway as scheduled on 7/29/22 @ 2:15

## 2022-06-10 NOTE — TELEPHONE ENCOUNTER
Patient is returning call from [facility] and is requesting a call back to discuss MRI results

## 2022-06-10 NOTE — TELEPHONE ENCOUNTER
Voicemail message left for patient to please return call to relay MRI results and that patient is to follow up as scheduled with Dr Opal Costa

## 2022-06-10 NOTE — TELEPHONE ENCOUNTER
----- Message from Sherlyn Garcia MD sent at 6/9/2022  7:23 PM EDT -----  Please let the patient and her daughter know that the kidney looks excellent after cryoablation    Will plan follow-up as scheduled

## 2022-06-20 DIAGNOSIS — G43.719 CHRONIC MIGRAINE WITHOUT AURA, INTRACTABLE, WITHOUT STATUS MIGRAINOSUS: Primary | ICD-10-CM

## 2022-06-20 RX ORDER — GALCANEZUMAB 120 MG/ML
INJECTION, SOLUTION SUBCUTANEOUS
Qty: 1 ML | Refills: 2 | Status: SHIPPED | OUTPATIENT
Start: 2022-06-20

## 2022-06-23 DIAGNOSIS — K21.9 GASTROESOPHAGEAL REFLUX DISEASE WITHOUT ESOPHAGITIS: ICD-10-CM

## 2022-06-23 RX ORDER — PANTOPRAZOLE SODIUM 40 MG/1
TABLET, DELAYED RELEASE ORAL
Qty: 90 TABLET | Refills: 3 | Status: SHIPPED | OUTPATIENT
Start: 2022-06-23

## 2022-06-27 DIAGNOSIS — K59.09 CHRONIC CONSTIPATION: ICD-10-CM

## 2022-06-28 DIAGNOSIS — K59.09 CHRONIC CONSTIPATION: ICD-10-CM

## 2022-06-28 RX ORDER — LUBIPROSTONE 24 UG/1
24 CAPSULE, GELATIN COATED ORAL 2 TIMES DAILY WITH MEALS
Qty: 180 CAPSULE | Refills: 0 | Status: SHIPPED | OUTPATIENT
Start: 2022-06-28 | End: 2022-06-29 | Stop reason: SDUPTHER

## 2022-06-29 ENCOUNTER — OFFICE VISIT (OUTPATIENT)
Dept: INTERNAL MEDICINE CLINIC | Facility: CLINIC | Age: 57
End: 2022-06-29
Payer: COMMERCIAL

## 2022-06-29 DIAGNOSIS — E78.5 HYPERLIPIDEMIA, UNSPECIFIED HYPERLIPIDEMIA TYPE: ICD-10-CM

## 2022-06-29 DIAGNOSIS — Z02.89 PHYSICAL EXAMINATION OF EMPLOYEE: Primary | ICD-10-CM

## 2022-06-29 DIAGNOSIS — J01.10 ACUTE NON-RECURRENT FRONTAL SINUSITIS: ICD-10-CM

## 2022-06-29 DIAGNOSIS — Z13.29 SCREENING FOR THYROID DISORDER: ICD-10-CM

## 2022-06-29 DIAGNOSIS — F32.A DEPRESSION, UNSPECIFIED DEPRESSION TYPE: ICD-10-CM

## 2022-06-29 DIAGNOSIS — K59.09 CHRONIC CONSTIPATION: ICD-10-CM

## 2022-06-29 PROCEDURE — 99499 UNLISTED E&M SERVICE: CPT | Performed by: PHYSICIAN ASSISTANT

## 2022-06-29 RX ORDER — BUPROPION HYDROCHLORIDE 300 MG/1
300 TABLET ORAL EVERY MORNING
Qty: 90 TABLET | Refills: 3 | Status: SHIPPED | OUTPATIENT
Start: 2022-06-29

## 2022-06-29 RX ORDER — LUBIPROSTONE 24 UG/1
24 CAPSULE, GELATIN COATED ORAL 2 TIMES DAILY WITH MEALS
Qty: 180 CAPSULE | Refills: 0 | Status: SHIPPED | OUTPATIENT
Start: 2022-06-29

## 2022-06-29 RX ORDER — AZITHROMYCIN 250 MG/1
TABLET, FILM COATED ORAL
Qty: 6 TABLET | Refills: 0 | Status: SHIPPED | OUTPATIENT
Start: 2022-06-29 | End: 2022-06-29 | Stop reason: SDUPTHER

## 2022-06-29 RX ORDER — AZITHROMYCIN 250 MG/1
TABLET, FILM COATED ORAL
Qty: 6 TABLET | Refills: 0 | Status: SHIPPED | OUTPATIENT
Start: 2022-06-29 | End: 2022-07-04

## 2022-06-30 NOTE — PROGRESS NOTES
Assessment/Plan:  Problem List Items Addressed This Visit        Other    Depression    Relevant Medications    buPROPion (Wellbutrin XL) 300 mg 24 hr tablet    Hyperlipidemia    Relevant Orders    Lipid panel    Physical examination of employee - Primary      Other Visit Diagnoses     Screening for thyroid disorder        Relevant Orders    TSH, 3rd generation    Acute non-recurrent frontal sinusitis        Relevant Medications    azithromycin (Zithromax) 250 mg tablet           Diagnoses and all orders for this visit:    Physical examination of employee    Hyperlipidemia, unspecified hyperlipidemia type  -     Lipid panel; Future    Screening for thyroid disorder  -     TSH, 3rd generation; Future    Depression, unspecified depression type  -     buPROPion (Wellbutrin XL) 300 mg 24 hr tablet; Take 1 tablet (300 mg total) by mouth every morning    Acute non-recurrent frontal sinusitis  -     Discontinue: azithromycin (Zithromax) 250 mg tablet; Take 2 tablets (500 mg total) by mouth daily for 1 day, THEN 1 tablet (250 mg total) daily for 4 days  -     azithromycin (Zithromax) 250 mg tablet; Take 2 tablets (500 mg total) by mouth daily for 1 day, THEN 1 tablet (250 mg total) daily for 4 days  No problem-specific Assessment & Plan notes found for this encounter  Subjective:      Patient ID: Jacobo Lyle is a 65 y o  female  Pt presents for [facility] physical  Vision is 20/20 OU corrected  She can distinguish colors on Snellen eye chart  No hearing impairment  No substance abuse  Anxiety is well controlled  No diabetes or seizure disorder  The following portions of the patient's history were reviewed and updated as appropriate:   She has a past medical history of Cervical cancer ([facility] [address] ), Depression, GERD (gastroesophageal reflux disease), Migraine, and Renal cell carcinoma of right kidney ([facility] [address] ) (3/25/2022)  ,  does not have any pertinent problems on file  ,   has a past surgical history that includes Wrist surgery (Right); Hysterectomy; Laparoscopic small bowel resection; IR biopsy liver random (3/15/2022); and IR cryoablation (3/15/2022)  ,  family history includes No Known Problems in her father and mother  ,   reports that she has never smoked  She has never used smokeless tobacco  She reports current alcohol use  She reports that she does not use drugs  ,  has No Known Allergies     Current Outpatient Medications   Medication Sig Dispense Refill    azithromycin (Zithromax) 250 mg tablet Take 2 tablets (500 mg total) by mouth daily for 1 day, THEN 1 tablet (250 mg total) daily for 4 days  6 tablet 0    buPROPion (Wellbutrin XL) 300 mg 24 hr tablet Take 1 tablet (300 mg total) by mouth every morning 90 tablet 3    Amitiza 24 MCG capsule Take 1 capsule (24 mcg total) by mouth 2 (two) times a day with meals 180 capsule 0    Biotin 1 MG CAPS Take 1 capsule (1 mg total) by mouth daily      cholecalciferol (VITAMIN D3) 1,000 units tablet Take 1 tablet (1,000 Units total) by mouth daily      Emgality 120 MG/ML SOSY Inject 1 syringe monthly as directed  1 mL 2    escitalopram (Lexapro) 20 mg tablet Take 1 tablet daily 90 tablet 1    ketorolac (TORADOL) 10 mg tablet take 1 tablet by mouth every 6 hours if needed for MODERATE PAIN      (REFER TO PRESCRIPTION NOTES)        ketorolac (TORADOL) 30 mg/mL injection ketorolac 30 mg/mL (1 mL) injection solution      LORazepam (Ativan) 0 5 mg tablet Take 1 tablet (0 5 mg total) by mouth 2 (two) times a day 60 tablet 1    pantoprazole (PROTONIX) 40 mg tablet TAKE 1 TABLET DAILY 90 tablet 3    rosuvastatin (CRESTOR) 10 MG tablet TAKE 1 TABLET DAILY 90 tablet 3    ursodiol (ACTIGALL) 300 mg capsule Take 1 capsule (300 mg total) by mouth 2 (two) times a day 180 capsule 3     Current Facility-Administered Medications   Medication Dose Route Frequency Provider Last Rate Last Admin    ondansetron (ZOFRAN-ODT) dispersible tablet 4 mg  4 mg Oral Q6H PRN RUPINDER Juárez 4 mg at 11/17/18 1717       Review of Systems   Constitutional: Negative for chills and fever  HENT: Negative for congestion, ear pain, hearing loss, postnasal drip, rhinorrhea, sinus pressure, sinus pain, sore throat and trouble swallowing  Eyes: Negative for pain and visual disturbance  Respiratory: Negative for cough, chest tightness, shortness of breath and wheezing  Cardiovascular: Negative  Negative for chest pain, palpitations and leg swelling  Gastrointestinal: Negative for abdominal pain, blood in stool, constipation, diarrhea, nausea and vomiting  Endocrine: Negative for cold intolerance, heat intolerance, polydipsia, polyphagia and polyuria  Genitourinary: Negative for difficulty urinating, dysuria, flank pain and urgency  Musculoskeletal: Negative for arthralgias, back pain, gait problem and myalgias  Skin: Negative for rash  Allergic/Immunologic: Negative  Neurological: Negative for dizziness, weakness, light-headedness and headaches  Hematological: Negative  Psychiatric/Behavioral: Negative for behavioral problems, dysphoric mood and sleep disturbance  The patient is not nervous/anxious  PHQ-2/9 Depression Screening            Objective: There were no vitals filed for this visit  There is no height or weight on file to calculate BMI  Physical Exam  Constitutional:       General: She is not in acute distress  Appearance: She is well-developed  She is not diaphoretic  HENT:      Head: Normocephalic and atraumatic  Right Ear: External ear normal       Left Ear: External ear normal       Nose: Nose normal       Mouth/Throat:      Pharynx: No oropharyngeal exudate  Eyes:      General: No scleral icterus  Right eye: No discharge  Left eye: No discharge  Conjunctiva/sclera: Conjunctivae normal       Pupils: Pupils are equal, round, and reactive to light  Neck:      Thyroid: No thyromegaly     Cardiovascular:      Rate and Rhythm: Normal rate and regular rhythm  Heart sounds: Normal heart sounds  No murmur heard  No friction rub  No gallop  Pulmonary:      Effort: Pulmonary effort is normal  No respiratory distress  Breath sounds: Normal breath sounds  No wheezing or rales  Abdominal:      General: Bowel sounds are normal  There is no distension  Palpations: Abdomen is soft  Tenderness: There is no abdominal tenderness  Musculoskeletal:         General: No tenderness or deformity  Normal range of motion  Cervical back: Normal range of motion and neck supple  Skin:     General: Skin is warm and dry  Neurological:      Mental Status: She is alert and oriented to person, place, and time  Cranial Nerves: No cranial nerve deficit  Psychiatric:         Behavior: Behavior normal          Thought Content:  Thought content normal          Judgment: Judgment normal

## 2022-07-04 DIAGNOSIS — F41.0 PANIC ATTACKS: ICD-10-CM

## 2022-07-05 RX ORDER — LORAZEPAM 0.5 MG/1
0.5 TABLET ORAL 2 TIMES DAILY
Qty: 60 TABLET | Refills: 0 | Status: SHIPPED | OUTPATIENT
Start: 2022-07-05

## 2022-07-28 ENCOUNTER — TELEPHONE (OUTPATIENT)
Dept: UROLOGY | Facility: CLINIC | Age: 57
End: 2022-07-28

## 2022-07-28 NOTE — TELEPHONE ENCOUNTER
Patient returning call  Patient stated she gets an MRI every 3 months and requesting to just schedule her follow up in September because it is 3 months after her last MRI  Patient scheduled 9/23/22 with Dr Yecenia Velazquez in [facility]  Patient also requesting an order be placed for her next MRI and a call back at [phone] when the order is in this way she can schedule it for September

## 2022-07-28 NOTE — TELEPHONE ENCOUNTER
Left message for patient that virtual visit with Dr Last Alex for tomorrow 7/29/22 at 2:15 will need to be cancelled due to MD sick  Asked patient to call back to reschedule to 8/24/22

## 2022-07-28 NOTE — TELEPHONE ENCOUNTER
Called the patient and left a voicemail stating her VV with Dr Vivian Busch tomorrow needs to be rescheduled  MD out of the office  Asked patient to call for a new appointment

## 2022-07-29 ENCOUNTER — TELEPHONE (OUTPATIENT)
Dept: UROLOGY | Facility: CLINIC | Age: 57
End: 2022-07-29

## 2022-07-29 NOTE — TELEPHONE ENCOUNTER
----- Message from Diallo Torres RN sent at 7/29/2022 10:06 AM EDT -----  Regarding: FW: MRI   H/O clear cell renal cell, S/P cryoablation  Last MRI was June 2022  Any reason she needs another this soon?   ----- Message -----  From: Fabian Arias  Sent: 7/28/2022   4:42 PM EDT  To: [facility]  Subject: MRI                                              Will I need an order slip for the MRI ? I want to schedule the appointment before my follow up visit      Thank you,  Herb Rodríguez

## 2022-07-29 NOTE — TELEPHONE ENCOUNTER
PT1 a small mass treated with cryoablation  NCCN guidelines recommend MRI surveillance at 3 months, then 12 months  Her 3 month MRi looks excellent   Next MRI will be due March 2023 (12 months from tx)

## 2022-08-01 NOTE — TELEPHONE ENCOUNTER
Left message per communication consent  I informed patient that she I does not need to have a MRI until March 2023

## 2022-08-23 DIAGNOSIS — G43.719 CHRONIC MIGRAINE WITHOUT AURA, INTRACTABLE, WITHOUT STATUS MIGRAINOSUS: ICD-10-CM

## 2022-08-24 RX ORDER — GALCANEZUMAB 120 MG/ML
INJECTION, SOLUTION SUBCUTANEOUS
Qty: 1 ML | Refills: 0 | Status: SHIPPED | OUTPATIENT
Start: 2022-08-24 | End: 2022-10-02 | Stop reason: SDUPTHER

## 2022-09-13 DIAGNOSIS — F32.A DEPRESSION, UNSPECIFIED DEPRESSION TYPE: ICD-10-CM

## 2022-09-13 RX ORDER — ESCITALOPRAM OXALATE 20 MG/1
TABLET ORAL
Qty: 90 TABLET | Refills: 3 | Status: SHIPPED | OUTPATIENT
Start: 2022-09-13

## 2022-09-15 ENCOUNTER — TELEPHONE (OUTPATIENT)
Dept: UROLOGY | Facility: CLINIC | Age: 57
End: 2022-09-15

## 2022-09-15 DIAGNOSIS — C64.1 RENAL CELL CARCINOMA OF RIGHT KIDNEY (HCC): Primary | ICD-10-CM

## 2022-09-15 NOTE — TELEPHONE ENCOUNTER
----- Message from Elza Quintero sent at 9/15/2022 11:26 AM EDT -----  Regarding: Appointment    Can someone please call me ? My appointment is for next Friday 9/23 and I need a script for a cat scan   according to your message before appointment  I want to keep my appointment       Herb Dillon

## 2022-09-16 NOTE — TELEPHONE ENCOUNTER
MRI, chest x-ray and BMP ordered for March    As the patient has difficulty with transportation and physical visits, can we offer her visit with Guilherme Cantor in the [place] office at that time

## 2022-09-16 NOTE — TELEPHONE ENCOUNTER
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

## 2022-10-02 DIAGNOSIS — G43.719 CHRONIC MIGRAINE WITHOUT AURA, INTRACTABLE, WITHOUT STATUS MIGRAINOSUS: ICD-10-CM

## 2022-10-03 DIAGNOSIS — K59.09 CHRONIC CONSTIPATION: ICD-10-CM

## 2022-10-03 RX ORDER — GALCANEZUMAB 120 MG/ML
INJECTION, SOLUTION SUBCUTANEOUS
Qty: 1 ML | Refills: 2 | Status: SHIPPED | OUTPATIENT
Start: 2022-10-03

## 2022-10-04 RX ORDER — LUBIPROSTONE 24 UG/1
24 CAPSULE, GELATIN COATED ORAL 2 TIMES DAILY WITH MEALS
Qty: 180 CAPSULE | Refills: 1 | Status: SHIPPED | OUTPATIENT
Start: 2022-10-04

## 2022-10-05 ENCOUNTER — APPOINTMENT (OUTPATIENT)
Dept: LAB | Facility: CLINIC | Age: 57
End: 2022-10-05
Payer: COMMERCIAL

## 2022-10-05 DIAGNOSIS — E78.5 HYPERLIPIDEMIA, UNSPECIFIED HYPERLIPIDEMIA TYPE: ICD-10-CM

## 2022-10-05 DIAGNOSIS — Z13.29 SCREENING FOR THYROID DISORDER: ICD-10-CM

## 2022-10-05 LAB
CHOLEST SERPL-MCNC: 195 MG/DL
HDLC SERPL-MCNC: 99 MG/DL
LDLC SERPL CALC-MCNC: 90 MG/DL (ref 0–100)
NONHDLC SERPL-MCNC: 96 MG/DL
TRIGL SERPL-MCNC: 32 MG/DL
TSH SERPL DL<=0.05 MIU/L-ACNC: 1.76 UIU/ML (ref 0.45–4.5)

## 2022-10-05 PROCEDURE — 84443 ASSAY THYROID STIM HORMONE: CPT

## 2022-10-05 PROCEDURE — 36415 COLL VENOUS BLD VENIPUNCTURE: CPT

## 2022-10-05 PROCEDURE — 80061 LIPID PANEL: CPT

## 2022-10-18 RX ORDER — KETOROLAC TROMETHAMINE 30 MG/ML
INJECTION, SOLUTION INTRAMUSCULAR; INTRAVENOUS
Qty: 1 ML | Status: CANCELLED | OUTPATIENT
Start: 2022-10-18

## 2022-10-19 DIAGNOSIS — G43.719 CHRONIC MIGRAINE WITHOUT AURA, INTRACTABLE, WITHOUT STATUS MIGRAINOSUS: Primary | ICD-10-CM

## 2022-10-19 RX ORDER — KETOROLAC TROMETHAMINE 30 MG/ML
30 INJECTION, SOLUTION INTRAMUSCULAR; INTRAVENOUS ONCE
Qty: 1 ML | Refills: 3 | Status: SHIPPED | OUTPATIENT
Start: 2022-10-19 | End: 2022-10-20 | Stop reason: SDUPTHER

## 2022-10-19 RX ORDER — SYRINGE W-NEEDLE,DISPOSAB,3 ML 25GX5/8"
SYRINGE, EMPTY DISPOSABLE MISCELLANEOUS DAILY
Qty: 1 EACH | Refills: 5 | Status: SHIPPED | OUTPATIENT
Start: 2022-10-19 | End: 2022-10-20 | Stop reason: SDUPTHER

## 2022-10-20 DIAGNOSIS — G43.719 CHRONIC MIGRAINE WITHOUT AURA, INTRACTABLE, WITHOUT STATUS MIGRAINOSUS: ICD-10-CM

## 2022-10-20 RX ORDER — KETOROLAC TROMETHAMINE 30 MG/ML
30 INJECTION, SOLUTION INTRAMUSCULAR; INTRAVENOUS ONCE
Qty: 1 ML | Refills: 3 | Status: SHIPPED | OUTPATIENT
Start: 2022-10-20 | End: 2022-10-20

## 2022-10-20 RX ORDER — SYRINGE W-NEEDLE,DISPOSAB,3 ML 25GX5/8"
SYRINGE, EMPTY DISPOSABLE MISCELLANEOUS DAILY
Qty: 1 EACH | Refills: 5 | Status: SHIPPED | OUTPATIENT
Start: 2022-10-20

## 2022-11-04 ENCOUNTER — TELEPHONE (OUTPATIENT)
Dept: GASTROENTEROLOGY | Facility: CLINIC | Age: 57
End: 2022-11-04

## 2022-11-04 NOTE — TELEPHONE ENCOUNTER
----- Message from Dominic Martin MD sent at 11/4/2022  9:56 AM EDT -----  Regarding: FW: Follow up  Please call patient to schedule routine follow up  Virtual visit is fine  ----- Message -----  From: Stella Salinas RN  Sent: 11/4/2022   9:12 AM EDT  To: Dominic Martin MD  Subject: FW: Follow up                                      ----- Message -----  From: Divya Cardoza  Sent: 11/3/2022   7:09 PM EDT  To: [facility]  Subject: Follow up                                        Dr Na Gerardo     The last time I saw you was April 2022  I never got appointment to come back  Do you want to see me if so can we do a virtual appointment  I’m not having any problems, just checking       Thank you,  Kriss Murrell

## 2023-01-02 DIAGNOSIS — G43.719 CHRONIC MIGRAINE WITHOUT AURA, INTRACTABLE, WITHOUT STATUS MIGRAINOSUS: ICD-10-CM

## 2023-01-03 RX ORDER — GALCANEZUMAB 120 MG/ML
INJECTION, SOLUTION SUBCUTANEOUS
Qty: 1 ML | Refills: 0 | Status: SHIPPED | OUTPATIENT
Start: 2023-01-03

## 2023-01-04 ENCOUNTER — TELEMEDICINE (OUTPATIENT)
Dept: GASTROENTEROLOGY | Facility: CLINIC | Age: 58
End: 2023-01-04

## 2023-01-04 DIAGNOSIS — R11.0 NAUSEA: ICD-10-CM

## 2023-01-04 DIAGNOSIS — K74.3 PRIMARY BILIARY CHOLANGITIS (HCC): Primary | ICD-10-CM

## 2023-01-04 DIAGNOSIS — K59.09 CHRONIC CONSTIPATION: ICD-10-CM

## 2023-01-04 DIAGNOSIS — Z86.010 HISTORY OF COLON POLYPS: ICD-10-CM

## 2023-01-04 NOTE — PROGRESS NOTES
[facility] - Outpatient Follow-up Note  Mohit Rios 65 y o  female MRN: [id number]  Encounter: [id number]          ASSESSMENT AND PLAN:    57F with PBC without cirrhosis, cervical cancer, renal cancer, depression and constipation seen by telehealth for follow up  Doing well  Taking ursodiol with normal alkaline phosphatase in April  Does report some mild nausea, suspect this is related to taking medications  Will trying spacing medications and taking ursodiol on an empty stomach  1  Primary biliary cholangitis (HCC)  Trend LFTs every 6 months  - Hepatic function panel; Future  - Continue ursodiol 300 mg twice daily    2  Nausea  - Try taking ursodiol with food  Cotninue pantoprazole on an empty stomach    3  Chronic constipation  - Continue amitiza 24mcg twice daily    4  History of colon polyps  - 2 small TAs 2021, repeat 2028    RTC in 1 year with LFT monitoring every 6 months or sooner if issues     ______________________________________________________________________    SUBJECTIVE:    Doing well  Has been getting some nausea when taking meds  Taking all meds together, 30 min before eating  No fatigue, jaundice or itchiness  Taking amitiza and no issues with constipation  Last seen 4/8/22 for PBC   on April labs  Telemedicine consent    Patient: Mohit Rios  Provider: Claire Singleton MD  Provider located at [address] [place] [id number] [phone]    The patient was identified by name and date of birth  Mohit Rios was informed that this is a telemedicine visit and that the visit is being conducted through the [employer]  She agrees to proceed     My office door was closed  No one else was in the room  She acknowledged consent and understanding of privacy and security of the video platform   The patient has agreed to participate and understands they can discontinue the visit at any time  Patient is aware this is a billable service  I spent 11 minutes with the patient during this visit  REVIEW OF SYSTEMS IS OTHERWISE NEGATIVE  Historical Information   Past Medical History:   Diagnosis Date   • Cervical cancer ([facility] )    • Depression    • GERD (gastroesophageal reflux disease)    • Migraine    • Renal cell carcinoma of right kidney ([facility] ) 3/25/2022     Past Surgical History:   Procedure Laterality Date   • HYSTERECTOMY     • IR BIOPSY LIVER RANDOM  3/15/2022   • IR CRYOABLATION  3/15/2022   • LAPAROSCOPIC SMALL BOWEL RESECTION     • WRIST SURGERY Right      Social History   Social History     Substance and Sexual Activity   Alcohol Use Yes    Comment: socially     Social History     Substance and Sexual Activity   Drug Use No     Social History     Tobacco Use   Smoking Status Never   Smokeless Tobacco Never     Family History   Problem Relation Age of Onset   • No Known Problems Mother    • No Known Problems Father    • Esophageal cancer Neg Hx        Meds/Allergies       Current Outpatient Medications:   •  Amitiza 24 MCG capsule  •  Biotin 1 MG CAPS  •  buPROPion (Wellbutrin XL) 300 mg 24 hr tablet  •  cholecalciferol (VITAMIN D3) 1,000 units tablet  •  Emgality 120 MG/ML SOSY  •  escitalopram (LEXAPRO) 20 mg tablet  •  ketorolac (TORADOL) 10 mg tablet  •  ketorolac (TORADOL) 30 mg/mL injection  •  LORazepam (Ativan) 0 5 mg tablet  •  pantoprazole (PROTONIX) 40 mg tablet  •  rosuvastatin (CRESTOR) 10 MG tablet  •  Syringe/Needle, Disp, (SYRINGE 3CC/21GX1") 21G X 1" 3 ML MISC  •  ursodiol (ACTIGALL) 300 mg capsule    Current Facility-Administered Medications:   •  ondansetron (ZOFRAN-ODT) dispersible tablet 4 mg, 4 mg, Oral, Q6H PRN, 4 mg at 11/17/18 1717    No Known Allergies        Objective   Seen by video visit  No acute distress  Pleasant cooperative appropriate  No increased work of breathing      Lab Results:   No visits with results within 1 Day(s) from this visit  Latest known visit with results is:   Appointment on 10/05/2022   Component Date Value   • Cholesterol 10/05/2022 195    • Triglycerides 10/05/2022 32    • HDL, Direct 10/05/2022 99    • LDL Calculated 10/05/2022 90    • Non-HDL-Chol (CHOL-HDL) 10/05/2022 96    • TSH 3RD GENERATON 10/05/2022 1 760          Radiology Results:   No results found

## 2023-02-23 ENCOUNTER — TELEPHONE (OUTPATIENT)
Dept: UROLOGY | Facility: AMBULATORY SURGERY CENTER | Age: 58
End: 2023-02-23

## 2023-02-23 NOTE — TELEPHONE ENCOUNTER
Pt has a MRI for 03/10/23  The patient states that the insurance will not pay it because they need more information       Please review and advice     Pt can be reached at [phone]

## 2023-02-27 NOTE — TELEPHONE ENCOUNTER
pT1a RCC small renal mass cryoablation March 2022  NCCN guidelines abdominal imaging at 3 months then q12 months for 3 years  Initial/last MRi was June 2022  MRI requested for now (March 2023, which is 12 months scan)  CXR is indicated now March 2023 as well    p2p completed today  MRI approved for March [id number]  Expires 8/26/23    Also There is a duplicate existing order request for ct abdomen pelvis w/wo leftover from September- this can be cancelled/removed

## 2023-03-08 ENCOUNTER — TELEPHONE (OUTPATIENT)
Dept: UROLOGY | Facility: CLINIC | Age: 58
End: 2023-03-08

## 2023-03-08 DIAGNOSIS — C64.1 RENAL CELL CARCINOMA OF RIGHT KIDNEY (HCC): Primary | ICD-10-CM

## 2023-03-08 NOTE — TELEPHONE ENCOUNTER
----- Message from Arya Enriquez sent at 3/7/2023  6:52 PM EST -----  Patient Emily Tello is scheduled for an MRI abdomen w wo contrast on 3/10/23  She will need a current BUN/Creat prior to her MRI  Thank you

## 2023-03-08 NOTE — TELEPHONE ENCOUNTER
Called and left detailed VM for patient that she will need BMP prior to scheduled MRI  Advised she can walk into any [facility] to have this completed prior to MRI  Asked patient to please call back to confirm she received message and will be getting blood work completed prior to MRI

## 2023-03-10 ENCOUNTER — HOSPITAL ENCOUNTER (OUTPATIENT)
Dept: RADIOLOGY | Facility: HOSPITAL | Age: 58
End: 2023-03-10

## 2023-03-10 ENCOUNTER — HOSPITAL ENCOUNTER (OUTPATIENT)
Dept: MRI IMAGING | Facility: HOSPITAL | Age: 58
End: 2023-03-10
Attending: UROLOGY

## 2023-03-10 DIAGNOSIS — C64.1 RENAL CELL CARCINOMA OF RIGHT KIDNEY (HCC): ICD-10-CM

## 2023-03-10 RX ADMIN — GADOBUTROL 6 ML: 604.72 INJECTION INTRAVENOUS at 11:11

## 2023-03-15 ENCOUNTER — PATIENT MESSAGE (OUTPATIENT)
Dept: UROLOGY | Facility: CLINIC | Age: 58
End: 2023-03-15

## 2023-03-17 ENCOUNTER — OFFICE VISIT (OUTPATIENT)
Dept: UROLOGY | Facility: CLINIC | Age: 58
End: 2023-03-17

## 2023-03-17 ENCOUNTER — TELEPHONE (OUTPATIENT)
Dept: OTHER | Facility: OTHER | Age: 58
End: 2023-03-17

## 2023-03-17 VITALS
HEIGHT: 71 IN | DIASTOLIC BLOOD PRESSURE: 80 MMHG | HEART RATE: 78 BPM | SYSTOLIC BLOOD PRESSURE: 138 MMHG | WEIGHT: 143.6 LBS | OXYGEN SATURATION: 98 % | BODY MASS INDEX: 20.1 KG/M2

## 2023-03-17 DIAGNOSIS — C64.1 RENAL CELL CARCINOMA OF RIGHT KIDNEY (HCC): Primary | ICD-10-CM

## 2023-03-17 NOTE — PROGRESS NOTES
UROLOGY FOLLOWUP NOTE     CHIEF COMPLAINT   Tiffani Roque is a 65 y o  female with a complaint of   Chief Complaint   Patient presents with   • Follow-up       History of Present Illness:     65 y o  female, with a personal history of cervical cancer and a family history of kidney cancer presents after recent CT and MRI demonstrated a small renal tumor on the right side  Patient has a history of cervical cancer status post hysterectomy, she then developed recurrence in the ovary and return to the operating room for oophorectomy  Patient has a history of perforated bowel likely related to postsurgical adhesions that required exploratory laparotomy  She does report a family history of kidney cancer in her sister who had her kidney removed and in her uncle  She presents with her daughter for discussion who is a nurse practitioner at [employer]  Patient presents today after her biopsy and cryoablation  No major issues with the procedure  Pathology was obtained from [facility] from her prior cervical procedure and compared  MR and CXR continue to look excellent now one year from procedure      Past Medical History:     Past Medical History:   Diagnosis Date   • Cervical cancer ([facility] [address] )    • Depression    • GERD (gastroesophageal reflux disease)    • Migraine    • Renal cell carcinoma of right kidney ([facility] [address] ) 3/25/2022       PAST SURGICAL HISTORY:     Past Surgical History:   Procedure Laterality Date   • HYSTERECTOMY     • IR BIOPSY LIVER RANDOM  3/15/2022   • IR CRYOABLATION  3/15/2022   • LAPAROSCOPIC SMALL BOWEL RESECTION     • WRIST SURGERY Right        CURRENT MEDICATIONS:     Current Outpatient Medications   Medication Sig Dispense Refill   • Amitiza 24 MCG capsule Take 1 capsule (24 mcg total) by mouth 2 (two) times a day with meals 180 capsule 1   • buPROPion (Wellbutrin XL) 300 mg 24 hr tablet Take 1 tablet (300 mg total) by mouth every morning 90 tablet 3   • cholecalciferol (VITAMIN D3) 1,000 units tablet Take 1 tablet (1,000 Units total) by mouth daily     • Emgality 120 MG/ML SOSY Inject 1 syringe monthly as directed  1 mL 0   • escitalopram (LEXAPRO) 20 mg tablet TAKE 1 TABLET DAILY 90 tablet 3   • ketorolac (TORADOL) 10 mg tablet take 1 tablet by mouth every 6 hours if needed for MODERATE PAIN      (REFER TO PRESCRIPTION NOTES)       • ketorolac (TORADOL) 30 mg/mL injection Inject 1 mL (30 mg total) into a muscle once for 1 dose 1 mL 3   • LORazepam (Ativan) 0 5 mg tablet Take 1 tablet (0 5 mg total) by mouth 2 (two) times a day 60 tablet 0   • pantoprazole (PROTONIX) 40 mg tablet TAKE 1 TABLET DAILY 90 tablet 3   • rosuvastatin (CRESTOR) 10 MG tablet TAKE 1 TABLET DAILY 90 tablet 3   • Syringe/Needle, Disp, (SYRINGE 3CC/21GX1") 21G X 1" 3 ML MISC Use in the morning 1 each 5   • ursodiol (ACTIGALL) 300 mg capsule Take 1 capsule (300 mg total) by mouth 2 (two) times a day 180 capsule 3   • Biotin 1 MG CAPS Take 1 capsule (1 mg total) by mouth daily       Current Facility-Administered Medications   Medication Dose Route Frequency Provider Last Rate Last Admin   • ondansetron (ZOFRAN-ODT) dispersible tablet 4 mg  4 mg Oral Q6H PRN RUPINDER Foote   4 mg at 11/17/18 1717       ALLERGIES:   No Known Allergies    SOCIAL HISTORY:     Social History     Socioeconomic History   • Marital status: /Civil Union     Spouse name: None   • Number of children: None   • Years of education: None   • Highest education level: None   Occupational History   • Occupation:    Tobacco Use   • Smoking status: Never   • Smokeless tobacco: Never   Vaping Use   • Vaping Use: Never used   Substance and Sexual Activity   • Alcohol use: Yes     Comment: socially, RARELY   • Drug use: Never   • Sexual activity: None   Other Topics Concern   • None   Social History Narrative   • None     Social Determinants of Health     Financial Resource Strain: Not on file   Food Insecurity: Not on file Transportation Needs: Not on file   Physical Activity: Not on file   Stress: Not on file   Social Connections: Not on file   Intimate Partner Violence: Not on file   Housing Stability: Not on file       SOCIAL HISTORY:     Family History   Problem Relation Age of Onset   • No Known Problems Mother    • No Known Problems Father    • Esophageal cancer Neg Hx        REVIEW OF SYSTEMS:     Review of Systems   Constitutional: Negative  Respiratory: Negative  Cardiovascular: Negative  Gastrointestinal: Negative  Genitourinary: Negative  Negative for flank pain and hematuria  Musculoskeletal: Negative  Skin: Negative  Psychiatric/Behavioral: The patient is not nervous/anxious  PHYSICAL EXAM:     /80 (BP Location: Right arm, Patient Position: Sitting, Cuff Size: Adult)   Pulse 78   Ht 5' 11" (1 803 m)   Wt 65 1 kg (143 lb 9 6 oz)   SpO2 98%   BMI 20 03 kg/m²     General:  Healthy appearing female in no acute distress  They have a normal affect  There is not appear to be any gross neurologic defects or abnormalities  HEENT:  Normocephalic, atraumatic  Neck is supple without any palpable lymphadenopathy  Cardiovascular:  Patient has normal palpable distal radial pulses  There is no significant peripheral edema  No JVD is noted  Respiratory:  Patient has unlabored respirations  There is no audible wheeze or rhonchi  Abdomen:  Abdomen is with healed surgical scars  Abdomen is soft and nontender  There is no tympany  Inguinal and umbilical hernia are not appreciated  Musculoskeletal:  Patient does not have significant CVA tenderness in the  flank with palpation or percussion  They full range of motion in all 4 extremities  Strength in all 4 extremities appears congruent  Patient is able to ambulate without assistance or difficulty  Dermatologic:  Patient has no skin abnormalities or rashes        LABS:     CBC:   Lab Results   Component Value Date    WBC 3 83 (L) 03/15/2022    HGB 13 0 03/15/2022    HCT 38 9 03/15/2022    MCV 89 03/15/2022     03/15/2022       BMP:   Lab Results   Component Value Date    GLUCOSE 91 11/04/2014    CALCIUM 10 2 (H) 04/08/2022     11/04/2014    K 4 3 04/08/2022    CO2 33 (H) 04/08/2022     04/08/2022    BUN 17 04/08/2022    CREATININE 0 97 04/08/2022       IMAGING:     3/10/23  MRI - ABDOMEN - WITH AND WITHOUT CONTRAST     INDICATION: 64-year-old female with history of right renal cell carcinoma status post cryoablation on 3/15/2022, surveillance, and primary biliary cholangitis  Remote history of cervical carcinoma      COMPARISON: MRI abdomen 6/4/2022      TECHNIQUE:  Multiplanar/multisequence MRI of the abdomen was performed before and after administration of contrast  Imaging performed on 1 5T MRI        IV Contrast:  6 mL of Gadobutrol injection (SINGLE-DOSE)      FINDINGS:     LOWER CHEST:   Unremarkable      LIVER:   Normal in size and configuration  No suspicious mass  Scattered hepatic cysts  The hepatic veins and portal veins are patent      BILE DUCTS: No intrahepatic or extrahepatic bile duct dilation       GALLBLADDER:  Normal      PANCREAS:  Unremarkable      ADRENAL GLANDS:  Normal      SPLEEN:  Normal      KIDNEYS/PROXIMAL URETERS: Decreased size of the cryoablation cavity in the posterolateral upper pole of the right kidney measuring 1 1 x 0 6 cm (11/145), previously 1 1 x 1 1 cm, at the site of the biopsy-proven renal cell carcinoma without suspicious enhancement to suggest local tumor recurrence      No hydronephrosis  No suspicious renal mass  Subcentimeter left simple renal cysts      BOWEL:   No dilated loops of bowel       PERITONEUM/RETROPERITONEUM: No mass  No ascites       LYMPH NODES: No abdominal lymphadenopathy      VASCULAR STRUCTURES:  No aneurysm      ABDOMINAL WALL:  Unremarkable      OSSEOUS STRUCTURES:  No suspicious osseous lesion    Unchanged grade 1 anterolisthesis of L4 on L5         IMPRESSION:     Decreased size of the right cryoablation cavity in the posterolateral upper pole right kidney without evidence of locally recurrent or metastatic renal cell carcinoma in the abdomen  3/15/22  PROCEDURE: Image-guided cryoablation     Procedural Personnel  Attending physician(s): [facility]     Pre-procedure diagnosis: Renal cell carcinoma  Post-procedure diagnosis: Same  Indication: Patient with incidentally found superior pole right kidney renal cell carcinoma presents for cryoablation      PROCEDURE SUMMARY:  - Image-guided cryoablation of superior pole right kidney mass  - Core needle biopsy of superior pole right kidney mass     PROCEDURE DETAILS:     Pre-procedure  Consent: Informed consent for the procedure including risks, benefits and alternatives was obtained and time-out was performed prior to the procedure  Preparation: The site was prepared and draped using maximal sterile barrier technique including cutaneous antisepsis      Anesthesia/sedation  Level of anesthesia/sedation: General anesthesia  Anesthesia/sedation administered by: Anesthesiology  Total intra-service sedation time (minutes): 120     Imaging prior to intervention  The patient was positioned prone  Initial imaging was performed  Imaging modality: Noncontrast CT  Target #1:  - Maximal diameter (cm): 1 5  - Location: Superior pole right kidney     Biopsy  Under CT guidance, the biopsy needle was advanced and positioned at the target  Target lesion: Target 1 above  Previous biopsy of same target (QCDR): No  - Biopsy type: Core needle  - Core needle biopsy device: Argon BioPince  - Core needle size: 18 gauge     Cryoablation  Under CT guidance, the ablation applicator(s) were advanced and positioned within the target(s)  For each target lesion the applicators were placed and repositioned as necessary to achieve the desired ablation zone     Ablation applicator: BTG IcePearl 2 1  Target #1:  - Therapeutic intent (QCDR): Curative  - Number of applicators: 1  Freeze Cycle 1:   - Freeze duration (minutes): 10   - Maximum intensity applied (percent): 100   Thaw Cycle 1:  - Thaw duration (minutes): 8  - Thaw cycle type: Passive thaw  Freeze Cycle 2:   - Freeze duration (minutes): 10    - Maximum intensity applied (percent): 100  Thaw Cycle 2:  - Thaw duration (minutes): 4  - Thaw cycle type: Passive thaw     Applicator removal  The ablation applicator(s) were removed and sterile bandages were applied      Imaging following ablation  Post-ablation imaging: Noncontrast CT  Post-ablation imaging findings: Small perilesional hemorrhage, likely status post biopsy  No pneumothorax      Radiation Dose  CT dose length product (mGy-cm): 1587 81      Additional Details  Specimens removed: 1 core needle sample of superior pole right renal mass placed into formalin  Estimated blood loss (mL): 1  Complications: No immediate complications      IMPRESSION:     Percutaneous cryoablation of superior pole right renal mass with biopsy      Plan:      - 4 hours bed rest status post ablation   - Follow up with urology  1/29/22  MRI OF THE ABDOMEN WITH AND WITHOUT CONTRAST WITH MRCP   IMPRESSION:     1   1 3 cm complex right renal mass with findings suspicious for internal enhancement  Findings are suspicious for neoplasm, see above for details      2  The etiology of the patient is clinical symptomatology with abnormal liver tests is not definitively identified on MRI  There is a uniform appearance to the liver without abnormal enhancement or nodular contour  The biliary system is within normal   limits  If there is clinical concern for radiographically occult osseous involving the liver, consider further evaluation tissue sampling as clinically indicated          ASSESSMENT:     3/15/22  Final Diagnosis    A  Kidney, Right, mass / during cryoablation:  - Epithelial neoplasm with clear cell features, favor renal cell carcinoma, nuclear Grade 3, unclassifiable  - See note  ASSESSMENT:     65 y o  female with small endophytic right posterior renal mass now status post interventional radiology biopsy and cryoablation    PLAN:     MR abd and CXR look excellent  In year two, will recommend MR in 6mos and if stable again in March 2024  Visit at that time

## 2023-03-17 NOTE — TELEPHONE ENCOUNTER
Pt called in stating she has an appt today but doesn't want to come to if her MRI results aren't ready  Spoke with Jailyn Pendleton and she will call pt back

## 2023-03-20 DIAGNOSIS — K74.3 PRIMARY BILIARY CHOLANGITIS (HCC): ICD-10-CM

## 2023-03-20 RX ORDER — URSODIOL 300 MG/1
CAPSULE ORAL
Qty: 180 CAPSULE | Refills: 3 | Status: SHIPPED | OUTPATIENT
Start: 2023-03-20

## 2023-03-26 DIAGNOSIS — G43.719 CHRONIC MIGRAINE WITHOUT AURA, INTRACTABLE, WITHOUT STATUS MIGRAINOSUS: ICD-10-CM

## 2023-03-27 RX ORDER — GALCANEZUMAB 120 MG/ML
INJECTION, SOLUTION SUBCUTANEOUS
Qty: 1 ML | Refills: 0 | Status: SHIPPED | OUTPATIENT
Start: 2023-03-27

## 2023-04-12 NOTE — TELEPHONE ENCOUNTER
Patient communicated via My Chart she wishes to wait until March 2023  Plan to cancel CT scan for now  Will send reminder card for March 2023 for imaging and scheduling of virtual visit  Will send encounter to MD for MRI orders    Thanks Minoxidil Counseling: Minoxidil is a topical medication which can increase blood flow where it is applied. It is uncertain how this medication increases hair growth. Side effects are uncommon and include stinging and allergic reactions.

## 2023-04-25 DIAGNOSIS — G43.719 CHRONIC MIGRAINE WITHOUT AURA, INTRACTABLE, WITHOUT STATUS MIGRAINOSUS: ICD-10-CM

## 2023-04-26 RX ORDER — GALCANEZUMAB 120 MG/ML
INJECTION, SOLUTION SUBCUTANEOUS
Qty: 1 ML | Refills: 0 | Status: SHIPPED | OUTPATIENT
Start: 2023-04-26

## 2023-04-26 NOTE — TELEPHONE ENCOUNTER
Requested medication(s) are due for refill today: Yes  Patient has already received a courtesy refill: No  Other reason request has been forwarded to provider: Can you please review Sig not completed

## 2023-05-27 DIAGNOSIS — G43.719 CHRONIC MIGRAINE WITHOUT AURA, INTRACTABLE, WITHOUT STATUS MIGRAINOSUS: ICD-10-CM

## 2023-05-29 DIAGNOSIS — E78.5 HYPERLIPIDEMIA, UNSPECIFIED HYPERLIPIDEMIA TYPE: ICD-10-CM

## 2023-05-29 RX ORDER — ROSUVASTATIN CALCIUM 10 MG/1
TABLET, COATED ORAL
Qty: 90 TABLET | Refills: 3 | Status: SHIPPED | OUTPATIENT
Start: 2023-05-29

## 2023-05-30 RX ORDER — GALCANEZUMAB 120 MG/ML
INJECTION, SOLUTION SUBCUTANEOUS
Qty: 1 ML | Refills: 1 | Status: SHIPPED | OUTPATIENT
Start: 2023-05-30

## 2023-06-06 DIAGNOSIS — F32.A DEPRESSION, UNSPECIFIED DEPRESSION TYPE: ICD-10-CM

## 2023-06-06 RX ORDER — BUPROPION HYDROCHLORIDE 300 MG/1
TABLET ORAL
Qty: 90 TABLET | Refills: 3 | Status: SHIPPED | OUTPATIENT
Start: 2023-06-06

## 2023-06-12 ENCOUNTER — RA CDI HCC (OUTPATIENT)
Dept: OTHER | Facility: HOSPITAL | Age: 58
End: 2023-06-12

## 2023-06-12 NOTE — PROGRESS NOTES
[facility]  coding opportunities          Chart Reviewed number of suggestions sent to Provider: 1  F33 0 Major depressive disorder, recurrent, mild  OR  F33 1 Major depressive disorder, recurrent, moderate  OR  F33 2 Major depressive disorder, recurrent, severe without psychotic features  OR  F33 3 Major depressive disorder, recurrent, severe, with psychotic features  OR  F33 41 Major depressive disorder, recurrent, in partial remission  OR  F33 42 Major depressive disorder, recurrent in full remission  OR  F33 8 Other recurrent depressive disorders       Patients Insurance        Commercial Insurance: [employer]

## 2023-06-19 DIAGNOSIS — K21.9 GASTROESOPHAGEAL REFLUX DISEASE WITHOUT ESOPHAGITIS: ICD-10-CM

## 2023-06-19 RX ORDER — PANTOPRAZOLE SODIUM 40 MG/1
TABLET, DELAYED RELEASE ORAL
Qty: 90 TABLET | Refills: 3 | Status: SHIPPED | OUTPATIENT
Start: 2023-06-19

## 2023-06-21 ENCOUNTER — OFFICE VISIT (OUTPATIENT)
Dept: INTERNAL MEDICINE CLINIC | Facility: CLINIC | Age: 58
End: 2023-06-21
Payer: COMMERCIAL

## 2023-06-21 VITALS
TEMPERATURE: 98.2 F | BODY MASS INDEX: 19.62 KG/M2 | DIASTOLIC BLOOD PRESSURE: 62 MMHG | WEIGHT: 140.13 LBS | SYSTOLIC BLOOD PRESSURE: 118 MMHG | OXYGEN SATURATION: 98 % | HEART RATE: 75 BPM | HEIGHT: 71 IN

## 2023-06-21 DIAGNOSIS — C64.1 RENAL CELL CARCINOMA OF RIGHT KIDNEY (HCC): Primary | ICD-10-CM

## 2023-06-21 DIAGNOSIS — G43.719 CHRONIC MIGRAINE WITHOUT AURA, INTRACTABLE, WITHOUT STATUS MIGRAINOSUS: ICD-10-CM

## 2023-06-21 DIAGNOSIS — Z78.0 POST-MENOPAUSAL: ICD-10-CM

## 2023-06-21 PROBLEM — R10.13 MIDEPIGASTRIC PAIN: Status: RESOLVED | Noted: 2022-01-10 | Resolved: 2023-06-21

## 2023-06-21 PROCEDURE — 99213 OFFICE O/P EST LOW 20 MIN: CPT | Performed by: PHYSICIAN ASSISTANT

## 2023-06-21 RX ORDER — GALCANEZUMAB 120 MG/ML
INJECTION, SOLUTION SUBCUTANEOUS
Qty: 1 ML | Refills: 5 | Status: SHIPPED | OUTPATIENT
Start: 2023-06-21 | End: 2023-06-23 | Stop reason: SDUPTHER

## 2023-06-21 NOTE — PROGRESS NOTES
Name: Ashu Dejesus      : 1965      MRN: [id number]  Encounter Provider: Andrew Hanks PA-C  Encounter Date: 2023   Encounter department: [address] [place] [address]     1  Renal cell carcinoma of right kidney [facility])  Assessment & Plan:  Doing well since treatment  Has MRI scheduled for   Post-menopausal  -     DXA bone density spine hip and pelvis; Future; Expected date: 2023    3  Chronic migraine without aura, intractable, without status migrainosus  Assessment & Plan:  Pts symptoms are stable with current regime  No changes at present  Orders:  -     Emgality 120 MG/ML SOSY; Inject 1ml monthly           Subjective      Pt presents for routine visit  She is doing well overall  She is up to date with labs, mammogram, and CRC screening  She is due for dexa scan  BP is normal  Since our last visit she had pneumonia and recovered nicely  She was also diagnosed with renal cell carcinoma and continues with surveillance MRIs  She denies any new complaints or concerns  Review of Systems   Constitutional: Negative for chills and fever  HENT: Negative for congestion, ear pain, hearing loss, postnasal drip, rhinorrhea, sinus pressure, sinus pain, sore throat and trouble swallowing  Eyes: Negative for pain and visual disturbance  Respiratory: Negative for cough, chest tightness, shortness of breath and wheezing  Cardiovascular: Negative  Negative for chest pain, palpitations and leg swelling  Gastrointestinal: Negative for abdominal pain, blood in stool, constipation, diarrhea, nausea and vomiting  Endocrine: Negative for cold intolerance, heat intolerance, polydipsia, polyphagia and polyuria  Genitourinary: Negative for difficulty urinating, dysuria, flank pain, hematuria and urgency  Musculoskeletal: Negative for arthralgias, back pain, gait problem and myalgias  Skin: Negative for color change and rash     Allergic/Immunologic: "Negative  Neurological: Negative for dizziness, seizures, syncope, weakness, light-headedness and headaches  Hematological: Negative  Psychiatric/Behavioral: Negative for behavioral problems, dysphoric mood and sleep disturbance  The patient is not nervous/anxious  All other systems reviewed and are negative  Current Outpatient Medications on File Prior to Visit   Medication Sig   • buPROPion (WELLBUTRIN XL) 300 mg 24 hr tablet TAKE 1 TABLET EVERY MORNING   • cholecalciferol (VITAMIN D3) 1,000 units tablet Take 1 tablet (1,000 Units total) by mouth daily   • escitalopram (LEXAPRO) 20 mg tablet TAKE 1 TABLET DAILY   • ketorolac (TORADOL) 10 mg tablet take 1 tablet by mouth every 6 hours if needed for MODERATE PAIN      (REFER TO PRESCRIPTION NOTES)  • LORazepam (Ativan) 0 5 mg tablet Take 1 tablet (0 5 mg total) by mouth 2 (two) times a day   • lubiprostone (AMITIZA) 24 mcg capsule TAKE 1 CAPSULE TWICE A DAY WITH MEALS   • pantoprazole (PROTONIX) 40 mg tablet TAKE 1 TABLET DAILY   • rosuvastatin (CRESTOR) 10 MG tablet TAKE 1 TABLET DAILY   • Syringe/Needle, Disp, (SYRINGE 3CC/21GX1\") 21G X 1\" 3 ML MISC Use in the morning   • ursodiol (ACTIGALL) 300 mg capsule TAKE 1 CAPSULE TWICE A DAY   • [DISCONTINUED] Emgality 120 MG/ML SOSY Inject 1ml monthly   • ketorolac (TORADOL) 30 mg/mL injection Inject 1 mL (30 mg total) into a muscle once for 1 dose   • [DISCONTINUED] Biotin 1 MG CAPS Take 1 capsule (1 mg total) by mouth daily   • [DISCONTINUED] methylPREDNISolone 4 MG tablet therapy pack Use as directed on package (Patient not taking: Reported on 6/21/2023)       Objective     /62 (BP Location: Left arm, Patient Position: Sitting)   Pulse 75   Temp 98 2 °F (36 8 °C)   Ht 5' 11\" (1 803 m)   Wt 63 6 kg (140 lb 2 oz)   SpO2 98%   BMI 19 54 kg/m²     Physical Exam  Vitals and nursing note reviewed  Constitutional:       General: She is not in acute distress       Appearance: She is " well-developed  She is not diaphoretic  HENT:      Head: Normocephalic and atraumatic  Right Ear: External ear normal       Left Ear: External ear normal       Nose: Nose normal       Mouth/Throat:      Pharynx: No oropharyngeal exudate  Eyes:      General: No scleral icterus  Right eye: No discharge  Left eye: No discharge  Conjunctiva/sclera: Conjunctivae normal       Pupils: Pupils are equal, round, and reactive to light  Neck:      Thyroid: No thyromegaly  Cardiovascular:      Rate and Rhythm: Normal rate and regular rhythm  Heart sounds: Normal heart sounds  No murmur heard  No friction rub  No gallop  Pulmonary:      Effort: Pulmonary effort is normal  No respiratory distress  Breath sounds: Normal breath sounds  No wheezing or rales  Abdominal:      General: Bowel sounds are normal  There is no distension  Palpations: Abdomen is soft  Tenderness: There is no abdominal tenderness  Musculoskeletal:         General: No tenderness or deformity  Normal range of motion  Cervical back: Normal range of motion and neck supple  Skin:     General: Skin is warm and dry  Neurological:      Mental Status: She is alert and oriented to person, place, and time  Cranial Nerves: No cranial nerve deficit  Psychiatric:         Behavior: Behavior normal          Thought Content:  Thought content normal          Judgment: Judgment normal        Debbie Flood PA-C

## 2023-06-23 DIAGNOSIS — G43.719 CHRONIC MIGRAINE WITHOUT AURA, INTRACTABLE, WITHOUT STATUS MIGRAINOSUS: ICD-10-CM

## 2023-06-23 RX ORDER — GALCANEZUMAB 120 MG/ML
INJECTION, SOLUTION SUBCUTANEOUS
Qty: 1 ML | Refills: 0 | Status: SHIPPED | OUTPATIENT
Start: 2023-06-23

## 2023-07-17 ENCOUNTER — OFFICE VISIT (OUTPATIENT)
Dept: INTERNAL MEDICINE CLINIC | Facility: CLINIC | Age: 58
End: 2023-07-17

## 2023-07-17 ENCOUNTER — APPOINTMENT (OUTPATIENT)
Dept: LAB | Facility: CLINIC | Age: 58
End: 2023-07-17
Payer: COMMERCIAL

## 2023-07-17 VITALS
OXYGEN SATURATION: 98 % | BODY MASS INDEX: 19.65 KG/M2 | WEIGHT: 140.38 LBS | TEMPERATURE: 99 F | HEART RATE: 78 BPM | DIASTOLIC BLOOD PRESSURE: 60 MMHG | SYSTOLIC BLOOD PRESSURE: 120 MMHG | HEIGHT: 71 IN

## 2023-07-17 DIAGNOSIS — E78.5 HYPERLIPIDEMIA, UNSPECIFIED HYPERLIPIDEMIA TYPE: ICD-10-CM

## 2023-07-17 DIAGNOSIS — E55.9 VITAMIN D DEFICIENCY: ICD-10-CM

## 2023-07-17 DIAGNOSIS — L65.9 HAIR LOSS: ICD-10-CM

## 2023-07-17 DIAGNOSIS — K74.3 PRIMARY BILIARY CHOLANGITIS (HCC): ICD-10-CM

## 2023-07-17 DIAGNOSIS — Z02.89 PHYSICAL EXAMINATION OF EMPLOYEE: Primary | ICD-10-CM

## 2023-07-17 DIAGNOSIS — C64.1 RENAL CELL CARCINOMA OF RIGHT KIDNEY (HCC): ICD-10-CM

## 2023-07-17 LAB
ALBUMIN SERPL BCP-MCNC: 4.1 G/DL (ref 3.5–5)
ALP SERPL-CCNC: 102 U/L (ref 46–116)
ALT SERPL W P-5'-P-CCNC: 20 U/L (ref 12–78)
ANION GAP SERPL CALCULATED.3IONS-SCNC: 0 MMOL/L
AST SERPL W P-5'-P-CCNC: 15 U/L (ref 5–45)
BASOPHILS # BLD AUTO: 0.04 THOUSANDS/ÂΜL (ref 0–0.1)
BASOPHILS NFR BLD AUTO: 1 % (ref 0–1)
BILIRUB DIRECT SERPL-MCNC: 0.14 MG/DL (ref 0–0.2)
BILIRUB SERPL-MCNC: 0.41 MG/DL (ref 0.2–1)
BUN SERPL-MCNC: 17 MG/DL (ref 5–25)
CALCIUM SERPL-MCNC: 10.1 MG/DL (ref 8.3–10.1)
CHLORIDE SERPL-SCNC: 108 MMOL/L (ref 96–108)
CHOLEST SERPL-MCNC: 198 MG/DL
CO2 SERPL-SCNC: 32 MMOL/L (ref 21–32)
CREAT SERPL-MCNC: 0.98 MG/DL (ref 0.6–1.3)
EOSINOPHIL # BLD AUTO: 0.1 THOUSAND/ÂΜL (ref 0–0.61)
EOSINOPHIL NFR BLD AUTO: 2 % (ref 0–6)
ERYTHROCYTE [DISTWIDTH] IN BLOOD BY AUTOMATED COUNT: 15.3 % (ref 11.6–15.1)
GFR SERPL CREATININE-BSD FRML MDRD: 64 ML/MIN/1.73SQ M
GLUCOSE P FAST SERPL-MCNC: 87 MG/DL (ref 65–99)
HCT VFR BLD AUTO: 38.4 % (ref 34.8–46.1)
HDLC SERPL-MCNC: 96 MG/DL
HGB BLD-MCNC: 12 G/DL (ref 11.5–15.4)
IMM GRANULOCYTES # BLD AUTO: 0.02 THOUSAND/UL (ref 0–0.2)
IMM GRANULOCYTES NFR BLD AUTO: 0 % (ref 0–2)
IRON SATN MFR SERPL: 8 % (ref 15–50)
IRON SERPL-MCNC: 42 UG/DL (ref 50–170)
LDLC SERPL CALC-MCNC: 89 MG/DL (ref 0–100)
LYMPHOCYTES # BLD AUTO: 1.83 THOUSANDS/ÂΜL (ref 0.6–4.47)
LYMPHOCYTES NFR BLD AUTO: 36 % (ref 14–44)
MCH RBC QN AUTO: 27.1 PG (ref 26.8–34.3)
MCHC RBC AUTO-ENTMCNC: 31.3 G/DL (ref 31.4–37.4)
MCV RBC AUTO: 87 FL (ref 82–98)
MONOCYTES # BLD AUTO: 0.45 THOUSAND/ÂΜL (ref 0.17–1.22)
MONOCYTES NFR BLD AUTO: 9 % (ref 4–12)
NEUTROPHILS # BLD AUTO: 2.6 THOUSANDS/ÂΜL (ref 1.85–7.62)
NEUTS SEG NFR BLD AUTO: 52 % (ref 43–75)
NONHDLC SERPL-MCNC: 102 MG/DL
NRBC BLD AUTO-RTO: 0 /100 WBCS
PLATELET # BLD AUTO: 279 THOUSANDS/UL (ref 149–390)
PMV BLD AUTO: 10.8 FL (ref 8.9–12.7)
POTASSIUM SERPL-SCNC: 4.7 MMOL/L (ref 3.5–5.3)
PROT SERPL-MCNC: 7.7 G/DL (ref 6.4–8.4)
RBC # BLD AUTO: 4.42 MILLION/UL (ref 3.81–5.12)
SODIUM SERPL-SCNC: 140 MMOL/L (ref 135–147)
TIBC SERPL-MCNC: 538 UG/DL (ref 250–450)
TRIGL SERPL-MCNC: 65 MG/DL
WBC # BLD AUTO: 5.04 THOUSAND/UL (ref 4.31–10.16)

## 2023-07-17 PROCEDURE — 83540 ASSAY OF IRON: CPT

## 2023-07-17 PROCEDURE — 82306 VITAMIN D 25 HYDROXY: CPT

## 2023-07-17 PROCEDURE — 36415 COLL VENOUS BLD VENIPUNCTURE: CPT

## 2023-07-17 PROCEDURE — 80053 COMPREHEN METABOLIC PANEL: CPT

## 2023-07-17 PROCEDURE — 82248 BILIRUBIN DIRECT: CPT

## 2023-07-17 PROCEDURE — 83550 IRON BINDING TEST: CPT

## 2023-07-17 PROCEDURE — 80061 LIPID PANEL: CPT

## 2023-07-17 PROCEDURE — 84443 ASSAY THYROID STIM HORMONE: CPT

## 2023-07-17 PROCEDURE — 99499 UNLISTED E&M SERVICE: CPT | Performed by: PHYSICIAN ASSISTANT

## 2023-07-17 PROCEDURE — 85025 COMPLETE CBC W/AUTO DIFF WBC: CPT

## 2023-07-17 PROCEDURE — 82607 VITAMIN B-12: CPT

## 2023-07-17 PROCEDURE — 82728 ASSAY OF FERRITIN: CPT

## 2023-07-17 NOTE — PROGRESS NOTES
Name: Jonas Luna      : 1965      MRN: [id number]  Encounter Provider: Sapna Fisher PA-C  Encounter Date: 2023   Encounter department: [id number] [address]     1. Physical examination of employee    2. Hair loss  -     TSH, 3rd generation; Future  -     CBC and differential; Future  -     Comprehensive metabolic panel; Future  -     Vitamin B12; Future  -     Iron Panel (Includes Ferritin, Iron Sat%, Iron, and TIBC); Future    3. Hyperlipidemia, unspecified hyperlipidemia type  -     Lipid panel; Future    4. Vitamin D deficiency  -     Vitamin D 25 hydroxy; Future    5. Primary biliary cholangitis (HCC)           Subjective      Pt presents for [facility] physical. She is doing well overall. No history of diabetes, HTN, hearing loss or vision loss. Vision is normal. She is feeling well. No rheumatologic or orthopedic condition that impairs her ability to safely perform her job. Review of Systems   Constitutional: Negative for chills and fever. HENT: Negative for congestion, ear pain, hearing loss, postnasal drip, rhinorrhea, sinus pressure, sinus pain, sore throat and trouble swallowing. Eyes: Negative for pain and visual disturbance. Respiratory: Negative for cough, chest tightness, shortness of breath and wheezing. Cardiovascular: Negative. Negative for chest pain, palpitations and leg swelling. Gastrointestinal: Negative for abdominal pain, blood in stool, constipation, diarrhea, nausea and vomiting. Endocrine: Negative for cold intolerance, heat intolerance, polydipsia, polyphagia and polyuria. Genitourinary: Negative for difficulty urinating, dysuria, flank pain and urgency. Musculoskeletal: Negative for arthralgias, back pain, gait problem and myalgias. Skin: Negative for rash. Allergic/Immunologic: Negative. Neurological: Negative for dizziness, weakness, light-headedness and headaches. Hematological: Negative. Psychiatric/Behavioral: Negative for behavioral problems, dysphoric mood and sleep disturbance. The patient is not nervous/anxious. Current Outpatient Medications on File Prior to Visit   Medication Sig   • buPROPion (WELLBUTRIN XL) 300 mg 24 hr tablet TAKE 1 TABLET EVERY MORNING   • cholecalciferol (VITAMIN D3) 1,000 units tablet Take 1 tablet (1,000 Units total) by mouth daily   • Emgality 120 MG/ML SOSY Inject 1ml monthly   • escitalopram (LEXAPRO) 20 mg tablet TAKE 1 TABLET DAILY   • ketorolac (TORADOL) 10 mg tablet take 1 tablet by mouth every 6 hours if needed for MODERATE PAIN . ..  (REFER TO PRESCRIPTION NOTES). • LORazepam (Ativan) 0.5 mg tablet Take 1 tablet (0.5 mg total) by mouth 2 (two) times a day   • lubiprostone (AMITIZA) 24 mcg capsule TAKE 1 CAPSULE TWICE A DAY WITH MEALS   • pantoprazole (PROTONIX) 40 mg tablet TAKE 1 TABLET DAILY   • rosuvastatin (CRESTOR) 10 MG tablet TAKE 1 TABLET DAILY   • Syringe/Needle, Disp, (SYRINGE 3CC/21GX1") 21G X 1" 3 ML MISC Use in the morning   • ursodiol (ACTIGALL) 300 mg capsule TAKE 1 CAPSULE TWICE A DAY   • ketorolac (TORADOL) 30 mg/mL injection Inject 1 mL (30 mg total) into a muscle once for 1 dose       Objective     /60 (BP Location: Left arm, Patient Position: Sitting)   Pulse 78   Temp 99 °F (37.2 °C)   Ht 5' 11" (1.803 m)   Wt 63.7 kg (140 lb 6 oz)   SpO2 98%   BMI 19.58 kg/m²     Physical Exam  Vitals and nursing note reviewed. Constitutional:       General: She is not in acute distress. Appearance: She is well-developed. She is not diaphoretic. HENT:      Head: Normocephalic and atraumatic. Right Ear: External ear normal.      Left Ear: External ear normal.      Nose: Nose normal.      Mouth/Throat:      Pharynx: No oropharyngeal exudate. Eyes:      General: No scleral icterus. Right eye: No discharge. Left eye: No discharge.       Conjunctiva/sclera: Conjunctivae normal.      Pupils: Pupils are equal, round, and reactive to light. Neck:      Thyroid: No thyromegaly. Cardiovascular:      Rate and Rhythm: Normal rate and regular rhythm. Heart sounds: Normal heart sounds. No murmur heard. No friction rub. No gallop. Pulmonary:      Effort: Pulmonary effort is normal. No respiratory distress. Breath sounds: Normal breath sounds. No wheezing or rales. Abdominal:      General: Bowel sounds are normal. There is no distension. Palpations: Abdomen is soft. Tenderness: There is no abdominal tenderness. Musculoskeletal:         General: No tenderness or deformity. Normal range of motion. Cervical back: Normal range of motion and neck supple. Skin:     General: Skin is warm and dry. Neurological:      Mental Status: She is alert and oriented to person, place, and time. Cranial Nerves: No cranial nerve deficit. Psychiatric:         Behavior: Behavior normal.         Thought Content:  Thought content normal.         Judgment: Judgment normal.       Debbie Flood PA-C

## 2023-07-18 LAB
25(OH)D3 SERPL-MCNC: 51.6 NG/ML (ref 30–100)
FERRITIN SERPL-MCNC: 6 NG/ML (ref 11–307)
TSH SERPL DL<=0.05 MIU/L-ACNC: 2.29 UIU/ML (ref 0.45–4.5)
VIT B12 SERPL-MCNC: 273 PG/ML (ref 180–914)

## 2023-07-26 DIAGNOSIS — G43.719 CHRONIC MIGRAINE WITHOUT AURA, INTRACTABLE, WITHOUT STATUS MIGRAINOSUS: ICD-10-CM

## 2023-07-26 RX ORDER — GALCANEZUMAB 120 MG/ML
INJECTION, SOLUTION SUBCUTANEOUS
Qty: 1 ML | Refills: 3 | Status: SHIPPED | OUTPATIENT
Start: 2023-07-26

## 2023-07-27 DIAGNOSIS — K59.09 CHRONIC CONSTIPATION: ICD-10-CM

## 2023-07-27 RX ORDER — LUBIPROSTONE 24 UG/1
CAPSULE ORAL
Qty: 180 CAPSULE | Refills: 3 | Status: SHIPPED | OUTPATIENT
Start: 2023-07-27

## 2023-08-01 ENCOUNTER — HOSPITAL ENCOUNTER (OUTPATIENT)
Dept: BONE DENSITY | Facility: HOSPITAL | Age: 58
Discharge: HOME | End: 2023-08-01

## 2023-08-01 DIAGNOSIS — Z78.0 POST-MENOPAUSAL: ICD-10-CM

## 2023-09-08 DIAGNOSIS — F32.A DEPRESSION, UNSPECIFIED DEPRESSION TYPE: ICD-10-CM

## 2023-09-08 RX ORDER — ESCITALOPRAM OXALATE 20 MG/1
TABLET ORAL
Qty: 90 TABLET | Refills: 3 | Status: SHIPPED | OUTPATIENT
Start: 2023-09-08

## 2023-09-17 ENCOUNTER — HOSPITAL ENCOUNTER (OUTPATIENT)
Dept: MRI IMAGING | Facility: HOSPITAL | Age: 58
Discharge: HOME | End: 2023-09-17
Attending: UROLOGY
Payer: COMMERCIAL

## 2023-09-17 DIAGNOSIS — C64.1 RENAL CELL CARCINOMA OF RIGHT KIDNEY (HCC): ICD-10-CM

## 2023-09-17 PROCEDURE — G1004 CDSM NDSC: HCPCS

## 2023-09-17 PROCEDURE — A9585 GADOBUTROL INJECTION: HCPCS | Performed by: UROLOGY

## 2023-09-17 PROCEDURE — 74183 MRI ABD W/O CNTR FLWD CNTR: CPT

## 2023-09-17 RX ORDER — GADOBUTROL 604.72 MG/ML
6 INJECTION INTRAVENOUS
Status: COMPLETED | OUTPATIENT
Start: 2023-09-17 | End: 2023-09-17

## 2023-09-17 RX ADMIN — GADOBUTROL 6 ML: 604.72 INJECTION INTRAVENOUS at 11:39

## 2023-09-22 ENCOUNTER — TELEPHONE (OUTPATIENT)
Dept: UROLOGY | Facility: CLINIC | Age: 58
End: 2023-09-22

## 2023-09-22 DIAGNOSIS — N28.89 RIGHT RENAL MASS: Primary | ICD-10-CM

## 2023-09-22 NOTE — TELEPHONE ENCOUNTER
Called patient and left a detailed vm, per communication consent. I informed the pt that her MRI looks excellent, per Dr. Hoff Se. Informed pt that it was recommended to follow-up in 6 months and to have an MRI done at that time. When pt calls back please schedule her for in 6 months for a f/u.

## 2023-09-22 NOTE — TELEPHONE ENCOUNTER
----- Message from Deepak Stevenson MD sent at 9/22/2023  1:25 PM EDT -----  Patient is 18 months status post cryoablation of her renal mass. MRI looks excellent. We will recommend follow-up with advanced practitioner in an additional 6 months which will be 2 years from treatment.   MRI can be performed at that time, has been ordered

## 2023-10-19 DIAGNOSIS — G43.719 CHRONIC MIGRAINE WITHOUT AURA, INTRACTABLE, WITHOUT STATUS MIGRAINOSUS: ICD-10-CM

## 2023-10-20 DIAGNOSIS — K59.09 CHRONIC CONSTIPATION: ICD-10-CM

## 2023-10-20 RX ORDER — LUBIPROSTONE 24 UG/1
24 CAPSULE ORAL 2 TIMES DAILY WITH MEALS
Qty: 180 CAPSULE | Refills: 3 | Status: SHIPPED | OUTPATIENT
Start: 2023-10-20

## 2023-10-20 RX ORDER — GALCANEZUMAB 120 MG/ML
INJECTION, SOLUTION SUBCUTANEOUS
Qty: 1 ML | Refills: 0 | Status: SHIPPED | OUTPATIENT
Start: 2023-10-20

## 2023-10-24 ENCOUNTER — TELEPHONE (OUTPATIENT)
Dept: INTERNAL MEDICINE CLINIC | Facility: CLINIC | Age: 58
End: 2023-10-24

## 2023-10-27 ENCOUNTER — TELEPHONE (OUTPATIENT)
Dept: OTHER | Facility: OTHER | Age: 58
End: 2023-10-27

## 2023-10-27 DIAGNOSIS — G43.719 CHRONIC MIGRAINE WITHOUT AURA, INTRACTABLE, WITHOUT STATUS MIGRAINOSUS: ICD-10-CM

## 2023-10-27 RX ORDER — SYRINGE W-NEEDLE,DISPOSAB,3 ML 25GX5/8"
SYRINGE, EMPTY DISPOSABLE MISCELLANEOUS DAILY
Qty: 1 EACH | Refills: 5 | Status: SHIPPED | OUTPATIENT
Start: 2023-10-27

## 2023-10-27 RX ORDER — KETOROLAC TROMETHAMINE 30 MG/ML
30 INJECTION, SOLUTION INTRAMUSCULAR; INTRAVENOUS ONCE
Qty: 1 ML | Refills: 0 | Status: SHIPPED | OUTPATIENT
Start: 2023-10-27 | End: 2023-10-27

## 2023-10-27 NOTE — TELEPHONE ENCOUNTER
Per neurology notes    Nurtec 75mg  Toradol tablet and injections  Effexor  Excedrin  Treximet  Sumatriptan  Zomig  Maxalt  Nortriptyline  zonegran

## 2023-10-27 NOTE — TELEPHONE ENCOUNTER
Prior auth denied because we do not have a documentation of past trail of medications  Eptinezumab-jjmr (Vyepti)  Erenumab-aooe (Aimovig)  Fremanezumab-vfrm (Ajovy)  OnabotulinumtoxinA (Botox)  Anticonvulsant(s) (e.g. divalproex sodium, topiramate, gabapentin, etc.)  Please specify other  Antidepressant(s) (e.g. amitriptyline, venlafaxine, nortriptyline, etc.)  Please specify other  Beta-blocker(s) (e.g. metoprolol, propranolol, timolol, etc.)  Please specify other  Triptan(s) (e.g. eletriptan, sumatriptan, zolmitriptan, etc.)  Please specify other    She has been in this since 6/2022 but I dont see anything documented

## 2023-10-27 NOTE — TELEPHONE ENCOUNTER
Patient called saying that she had spoke with Jonel Vaughan MA today regarding the refill of her medication Emgality 120 MG. Patient stated that she want Jomar Zazueta to know that the medication needs to be sent to the [facility] in [address].

## 2023-10-27 NOTE — TELEPHONE ENCOUNTER
Per our conversation. Patient advised you would look into it and be giving her a call back today. Thank you!

## 2023-12-04 DIAGNOSIS — G43.719 CHRONIC MIGRAINE WITHOUT AURA, INTRACTABLE, WITHOUT STATUS MIGRAINOSUS: ICD-10-CM

## 2023-12-04 RX ORDER — GALCANEZUMAB 120 MG/ML
INJECTION, SOLUTION SUBCUTANEOUS
Qty: 1 ML | Refills: 0 | Status: SHIPPED | OUTPATIENT
Start: 2023-12-04 | End: 2023-12-05 | Stop reason: SDUPTHER

## 2023-12-05 DIAGNOSIS — G43.719 CHRONIC MIGRAINE WITHOUT AURA, INTRACTABLE, WITHOUT STATUS MIGRAINOSUS: ICD-10-CM

## 2023-12-05 RX ORDER — GALCANEZUMAB 120 MG/ML
INJECTION, SOLUTION SUBCUTANEOUS
Qty: 1 ML | Refills: 0 | Status: SHIPPED | OUTPATIENT
Start: 2023-12-05

## 2023-12-05 RX ORDER — GALCANEZUMAB 120 MG/ML
INJECTION, SOLUTION SUBCUTANEOUS
Qty: 1 ML | Refills: 0 | OUTPATIENT
Start: 2023-12-05

## 2023-12-05 NOTE — TELEPHONE ENCOUNTER
I left message to call me back. Hi, this is Pankaj Lomax rebuild date of birth May 28, 1965. I got a message that my egality is being refilled by [facility] when it's supposed to be billed by [facility]. If someone could please call me [phone]. Thank you.

## 2023-12-05 NOTE — TELEPHONE ENCOUNTER
Patient needs Emgality 120 MG/ML SOSY refilled, send to [facility] on [address]. This was sent to [facility], she called and had that cancelled.

## 2023-12-18 DIAGNOSIS — G43.719 CHRONIC MIGRAINE WITHOUT AURA, INTRACTABLE, WITHOUT STATUS MIGRAINOSUS: ICD-10-CM

## 2023-12-18 RX ORDER — GALCANEZUMAB 120 MG/ML
INJECTION, SOLUTION SUBCUTANEOUS
Qty: 1 ML | Refills: 5 | Status: SHIPPED | OUTPATIENT
Start: 2023-12-18 | End: 2023-12-20 | Stop reason: SDUPTHER

## 2023-12-18 NOTE — TELEPHONE ENCOUNTER
Patient called stating [facility] on [facility] [address] [facility] does not have the Emgality in stock.     She is asking if this can be sent to [facility] in [place] instead.     Please advise.

## 2023-12-20 DIAGNOSIS — G43.719 CHRONIC MIGRAINE WITHOUT AURA, INTRACTABLE, WITHOUT STATUS MIGRAINOSUS: ICD-10-CM

## 2023-12-20 RX ORDER — GALCANEZUMAB 120 MG/ML
INJECTION, SOLUTION SUBCUTANEOUS
Qty: 1 ML | Refills: 5 | Status: SHIPPED | OUTPATIENT
Start: 2023-12-20

## 2024-01-04 ENCOUNTER — TELEPHONE (OUTPATIENT)
Dept: INTERNAL MEDICINE CLINIC | Facility: CLINIC | Age: 59
End: 2024-01-04

## 2024-01-04 NOTE — TELEPHONE ENCOUNTER
Patient called stating the Emgality is on back order at her pharmacy.     I told her if she finds a pharmacy that has it in stock and she lets us know we can send it there. She said everywhere she calls it is on back order.     She is asking if she was to get a headache can she use ketorolac (TORADOL) 30 mg/mL injection () ? She also asked with the Emgality be out of her system that she can take the toradol.     Please advise.

## 2024-01-09 ENCOUNTER — TELEPHONE (OUTPATIENT)
Dept: GASTROENTEROLOGY | Facility: CLINIC | Age: 59
End: 2024-01-09

## 2024-01-09 NOTE — TELEPHONE ENCOUNTER
----- Message from Naomi Ball MD sent at 1/9/2024  9:29 AM EST -----  Regarding: FW: Follow Up  Contact: [phone]  Yes! Virtual visit is great. Please schedule    ----- Message -----  From: Rhiannon Cortez MA  Sent: 1/8/2024  11:48 AM EST  To: Naomi Ball MD  Subject: FW: Follow Up                                    Please advise if you are ok to see patient as virtual visit  ----- Message -----  From: Adriana Pastor RN  Sent: 1/8/2024  11:43 AM EST  To: [facility]  Subject: FW: Follow Up                                      ----- Message -----  From: Tiffani Lancaster  Sent: 1/8/2024  11:00 AM EST  To: Gastro[facility]ology [facility]  Subject: Follow Up                                        Naomi Ball  I am due for a follow up appointment.  I was hoping we could do a virtual call like our last call.    Thank you,  Tiffani Lancaster

## 2024-01-10 DIAGNOSIS — G43.719 CHRONIC MIGRAINE WITHOUT AURA, INTRACTABLE, WITHOUT STATUS MIGRAINOSUS: ICD-10-CM

## 2024-01-10 RX ORDER — GALCANEZUMAB 120 MG/ML
INJECTION, SOLUTION SUBCUTANEOUS
Qty: 1 ML | Refills: 5 | Status: SHIPPED | OUTPATIENT
Start: 2024-01-10

## 2024-01-22 ENCOUNTER — TELEPHONE (OUTPATIENT)
Age: 59
End: 2024-01-22

## 2024-01-22 DIAGNOSIS — C53.8 MALIGNANT NEOPLASM OF OVERLAPPING SITES OF CERVIX (HCC): ICD-10-CM

## 2024-01-22 DIAGNOSIS — C64.1 RENAL CELL CARCINOMA OF RIGHT KIDNEY (HCC): Primary | ICD-10-CM

## 2024-01-22 NOTE — TELEPHONE ENCOUNTER
Verito from [facility] calling to find out CPT for genetic testing for kidney cancer.    Informed her, we are currently waiting for the provider's response on what testing it was he mentioned.    She is requesting when we contact patient back about this testing, to give her the CPT code so she can call her insurance back with it,

## 2024-01-22 NOTE — TELEPHONE ENCOUNTER
Pt managed by Dr. Montano    PT stated that at one of her appointments he mentioned genetic testing for kidney cancer due to family history.  Asking how to get this done.    Please advise.  Pt can be reached at [phone]

## 2024-01-23 ENCOUNTER — TELEPHONE (OUTPATIENT)
Dept: HEMATOLOGY ONCOLOGY | Facility: CLINIC | Age: 59
End: 2024-01-23

## 2024-01-23 NOTE — TELEPHONE ENCOUNTER
oops sorry! its just an ambulatory genetics referral that pt was asking for. they probably have a queue to get her in for her consult with them. nothing to do i don't think on our end

## 2024-01-23 NOTE — TELEPHONE ENCOUNTER
I called Tiffani to schedule a new patient appointment with the Cancer Risk and Genetics Program.      Outcome:   I left a voice message encouraging the patient to call the [facility] at [phone] to schedule this appointment. A [place] [employer] message was also send with our contact information.     Follow-up:   At this time the referral will be closed and we will wait to hear back from the patient regarding scheduling this appointment.

## 2024-01-25 NOTE — TELEPHONE ENCOUNTER
Called and spoke with Tiffani. There is also a patient message from patient. She is requesting a specific referral for genetic testing [facility] located on [facility]. Current referral was closed in her chart. Will forward to provider to enter new referral to patients requested location.

## 2024-01-26 NOTE — TELEPHONE ENCOUNTER
Spoke with Tiffani and made her aware generic referral to genetics was made and it was noted that patient would like consult at [facility] cancer risk and genetic assessment program. Patient will contact the office on [address] [facility] where she wishes to be seen and let our office know if anything additional is needed.

## 2024-01-26 NOTE — TELEPHONE ENCOUNTER
Please call patient to inform her that I am not able to place a direct referral to [facility] from the confines of the epic system.  I did place in the generic referral and the comments that she wishes to be seen by the [facility] cancer risk and genetic assessment program so hopefully this is sufficient    Will CC Yoselyn to see if she knows how to directly put in referral to [facility]

## 2024-01-29 NOTE — TELEPHONE ENCOUNTER
[facility] called back with fax number of [phone].  Referral faxed and received confirmation it was received.

## 2024-01-29 NOTE — TELEPHONE ENCOUNTER
Pt  calling to see if we have sent referral [facility].  Pt gave number on [phone] not sure if it is phone number or fax. They need to know when pt was last seen and demographics.      Please Advise pt can be reached at [phone]

## 2024-01-29 NOTE — TELEPHONE ENCOUNTER
[phone] and left a detailed message for office to call with an appropriate fax number to send referral to.

## 2024-02-20 ENCOUNTER — TELEMEDICINE (OUTPATIENT)
Dept: GASTROENTEROLOGY | Facility: CLINIC | Age: 59
End: 2024-02-20
Payer: COMMERCIAL

## 2024-02-20 DIAGNOSIS — K21.9 GASTROESOPHAGEAL REFLUX DISEASE WITHOUT ESOPHAGITIS: ICD-10-CM

## 2024-02-20 DIAGNOSIS — E61.1 IRON DEFICIENCY: ICD-10-CM

## 2024-02-20 DIAGNOSIS — K74.3 PRIMARY BILIARY CHOLANGITIS (HCC): Primary | ICD-10-CM

## 2024-02-20 PROCEDURE — 99214 OFFICE O/P EST MOD 30 MIN: CPT | Performed by: STUDENT IN AN ORGANIZED HEALTH CARE EDUCATION/TRAINING PROGRAM

## 2024-02-20 RX ORDER — QUINIDINE GLUCONATE 324 MG
240 TABLET, EXTENDED RELEASE ORAL 2 TIMES DAILY WITH MEALS
Qty: 180 TABLET | Refills: 3 | Status: SHIPPED | OUTPATIENT
Start: 2024-02-20

## 2024-02-20 RX ORDER — URSODIOL 300 MG/1
300 CAPSULE ORAL 2 TIMES DAILY
Qty: 180 CAPSULE | Refills: 3 | Status: SHIPPED | OUTPATIENT
Start: 2024-02-20

## 2024-02-20 NOTE — PROGRESS NOTES
[facility] - Outpatient Follow-up Note  Tiffani Lancaster 58 y.o. female MRN: [id number]  Encounter: [id number]          ASSESSMENT AND PLAN:    58F with PBC without cirrhosis, cervical cancer, renal cancer, depression and constipation seen by telehealth for follow up.  Normal alkaline phosphatase July 2023 on ursodiol.  1. Primary biliary cholangitis (HCC)  - Hepatic function panel; Future  - ursodiol (ACTIGALL) 300 mg capsule; Take 1 capsule (300 mg total) by mouth 2 (two) times a day  Dispense: 180 capsule; Refill: 3    2. Iron deficiency  Iron deficient but not anemic on last labs.  No overt bleeding and no longer menstruating.  EGD 2022 with no ulcer or malignancy.  Colonoscopy 2021 with diminutive polyps.  - Iron Panel (Includes Ferritin, Iron Sat%, Iron, and TIBC); Future  - ferrous gluconate (FERGON) 240 (27 FE) MG tablet; Take 1 tablet (240 mg total) by mouth 2 (two) times a day with meals  Dispense: 180 tablet; Refill: 3  - If unable to tolerate oral iron, may benefit from hematology referral for iron infusions    3. Gastroesophageal reflux disease without esophagitis  Unclear if she requires daily pantoprazole.  Recommend she try weaning this by decreasing to every other day and then stopping.  If she develops peptic symptoms, should go back on daily pantoprazole.    ______________________________________________________________________    SUBJECTIVE:    Doing well. No GI issues.  Having 1 formed BM per day. No blood in stool.  Getting some nausea with taking her pills in the AM on empty stomach. Takes pantoprazole at bedtime, not sure if it is doing anything.    Is iron deficient. Not seeing any blood in stool. Does feel tired.  Not using NSAIDs regularly    Lab 7/17/2023 CMP with alkaline phosphatase 102 and otherwise normal LFTs, CBC with hemoglobin 12, ferritin 6    Telemedicine consent    Patient: Tiffani Lancaster  Provider: Naomi Ball MD  Provider located at [facility]  [facility] [place]  [address] [facility]  [facility] [address]  [phone]    The patient was identified by name and date of birth. Tiffani Lancaster was informed that this is a telemedicine visit and that the visit is being conducted through the Epic Embedded platform. She agrees to proceed..  My office door was closed. No one else was in the room.  She acknowledged consent and understanding of privacy and security of the video platform. The patient has agreed to participate and understands they can discontinue the visit at any time.    Patient is aware this is a billable service.     I spent 14 minutes with the patient during this visit.        REVIEW OF SYSTEMS IS OTHERWISE NEGATIVE.      Historical Information   Past Medical History:   Diagnosis Date    Cancer (HCC) 2009    cervical    Cervical cancer (HCC)     Colon polyp 2021    Depression     GERD (gastroesophageal reflux disease)     Hernia 2016    Migraine     Renal cell carcinoma of right kidney (HCC) 03/25/2022     Past Surgical History:   Procedure Laterality Date    ABDOMINAL SURGERY  2016    intestinal blockage    COLONOSCOPY  2021    HERNIA REPAIR  2016    HYSTERECTOMY      IR BIOPSY LIVER RANDOM  03/15/2022    IR CRYOABLATION  03/15/2022    LAPAROSCOPIC SMALL BOWEL RESECTION      WRIST SURGERY Right      Social History   Social History     Substance and Sexual Activity   Alcohol Use Yes    Comment: socially, RARELY     Social History     Substance and Sexual Activity   Drug Use No     Social History     Tobacco Use   Smoking Status Never   Smokeless Tobacco Never     Family History   Problem Relation Age of Onset    Arthritis Mother     Diabetes Father     Cancer Sister         kidney    Colon polyps Brother         polyps    Esophageal cancer Neg Hx        Meds/Allergies       Current Outpatient Medications:     buPROPion (WELLBUTRIN XL) 300 mg 24 hr tablet    cholecalciferol (VITAMIN D3) 1,000  "units tablet    Emgality 120 MG/ML SOSY    escitalopram (LEXAPRO) 20 mg tablet    ketorolac (TORADOL) 10 mg tablet    LORazepam (Ativan) 0.5 mg tablet    lubiprostone (AMITIZA) 24 mcg capsule    pantoprazole (PROTONIX) 40 mg tablet    rosuvastatin (CRESTOR) 10 MG tablet    Syringe/Needle, Disp, (SYRINGE 3CC/21GX1\") 21G X 1\" 3 ML MISC    ursodiol (ACTIGALL) 300 mg capsule    ketorolac (TORADOL) 30 mg/mL injection    Current Facility-Administered Medications:     ondansetron (ZOFRAN-ODT) dispersible tablet 4 mg, 4 mg, Oral, Q6H PRN, 4 mg at 11/17/18 1717    No Known Allergies        Objective   Seen via telehealth.  No acute distress.  Pleasant, cooperative, appropriate.  Excellent historian.  No increased work of breathing.  Speaking in full sentences.      Lab Results:   No visits with results within 1 Day(s) from this visit.   Latest known visit with results is:   Appointment on 07/17/2023   Component Date Value    TSH 3RD GENERATON 07/17/2023 2.285     WBC 07/17/2023 5.04     RBC 07/17/2023 4.42     Hemoglobin 07/17/2023 12.0     Hematocrit 07/17/2023 38.4     MCV 07/17/2023 87     MCH 07/17/2023 27.1     MCHC 07/17/2023 31.3 (L)     RDW 07/17/2023 15.3 (H)     MPV 07/17/2023 10.8     Platelets 07/17/2023 279     nRBC 07/17/2023 0     Neutrophils Relative 07/17/2023 52     Immat GRANS % 07/17/2023 0     Lymphocytes Relative 07/17/2023 36     Monocytes Relative 07/17/2023 9     Eosinophils Relative 07/17/2023 2     Basophils Relative 07/17/2023 1     Neutrophils Absolute 07/17/2023 2.60     Immature Grans Absolute 07/17/2023 0.02     Lymphocytes Absolute 07/17/2023 1.83     Monocytes Absolute 07/17/2023 0.45     Eosinophils Absolute 07/17/2023 0.10     Basophils Absolute 07/17/2023 0.04     Sodium 07/17/2023 140     Potassium 07/17/2023 4.7     Chloride 07/17/2023 108     CO2 07/17/2023 32     ANION GAP 07/17/2023 0     BUN 07/17/2023 17     Creatinine 07/17/2023 0.98     Glucose, Fasting 07/17/2023 87     Calcium " 07/17/2023 10.1     AST 07/17/2023 15     ALT 07/17/2023 20     Alkaline Phosphatase 07/17/2023 102     Total Protein 07/17/2023 7.7     Albumin 07/17/2023 4.1     Total Bilirubin 07/17/2023 0.41     eGFR 07/17/2023 64     Cholesterol 07/17/2023 198     Triglycerides 07/17/2023 65     HDL, Direct 07/17/2023 96     LDL Calculated 07/17/2023 89     Non-HDL-Chol (CHOL-HDL) 07/17/2023 102     Vitamin B-12 07/17/2023 273     Vit D, 25-Hydroxy 07/17/2023 51.6     Iron Saturation 07/17/2023 8 (L)     TIBC 07/17/2023 538 (H)     Iron 07/17/2023 42 (L)     Ferritin 07/17/2023 6 (L)     Bilirubin, Direct 07/17/2023 0.14          Radiology Results:   No results found.

## 2024-02-21 PROBLEM — Z02.89 PHYSICAL EXAMINATION OF EMPLOYEE: Status: RESOLVED | Noted: 2018-04-04 | Resolved: 2024-02-21

## 2024-02-22 DIAGNOSIS — G43.719 CHRONIC MIGRAINE WITHOUT AURA, INTRACTABLE, WITHOUT STATUS MIGRAINOSUS: ICD-10-CM

## 2024-02-22 RX ORDER — GALCANEZUMAB 120 MG/ML
INJECTION, SOLUTION SUBCUTANEOUS
Qty: 1 ML | Refills: 0 | Status: SHIPPED | OUTPATIENT
Start: 2024-02-22

## 2024-02-23 ENCOUNTER — TELEPHONE (OUTPATIENT)
Age: 59
End: 2024-02-23

## 2024-02-23 ENCOUNTER — TELEPHONE (OUTPATIENT)
Dept: INTERNAL MEDICINE CLINIC | Facility: CLINIC | Age: 59
End: 2024-02-23

## 2024-02-23 NOTE — TELEPHONE ENCOUNTER
Patient called today to confirm her rescheduled appointment scheduled on 4/19 at 11 AM with THELMA Ivy.    Pt is questioning if her appointment can be a telephone call versus an office visit?    Please review.    Please remind the patient of the office's address. Not sure if patient is aware that it is scheduled in [place] not the [place] location.    Call back [phone]    Minocycline Counseling: Patient advised regarding possible photosensitivity and discoloration of the teeth, skin, lips, tongue and gums.  Patient instructed to avoid sunlight, if possible.  When exposed to sunlight, patients should wear protective clothing, sunglasses, and sunscreen.  The patient was instructed to call the office immediately if the following severe adverse effects occur:  hearing changes, easy bruising/bleeding, severe headache, or vision changes.  The patient verbalized understanding of the proper use and possible adverse effects of minocycline.  All of the patient's questions and concerns were addressed.

## 2024-03-12 ENCOUNTER — APPOINTMENT (OUTPATIENT)
Dept: LAB | Facility: CLINIC | Age: 59
End: 2024-03-12
Payer: COMMERCIAL

## 2024-03-12 DIAGNOSIS — E61.1 IRON DEFICIENCY: ICD-10-CM

## 2024-03-12 DIAGNOSIS — N28.89 RIGHT RENAL MASS: ICD-10-CM

## 2024-03-12 DIAGNOSIS — K74.3 PRIMARY BILIARY CHOLANGITIS (HCC): ICD-10-CM

## 2024-03-12 LAB
ALBUMIN SERPL BCP-MCNC: 4.4 G/DL (ref 3.5–5)
ALP SERPL-CCNC: 72 U/L (ref 34–104)
ALT SERPL W P-5'-P-CCNC: 12 U/L (ref 7–52)
ANION GAP SERPL CALCULATED.3IONS-SCNC: 8 MMOL/L (ref 4–13)
AST SERPL W P-5'-P-CCNC: 19 U/L (ref 13–39)
BILIRUB DIRECT SERPL-MCNC: 0.08 MG/DL (ref 0–0.2)
BILIRUB SERPL-MCNC: 0.53 MG/DL (ref 0.2–1)
BUN SERPL-MCNC: 11 MG/DL (ref 5–25)
CALCIUM SERPL-MCNC: 9.6 MG/DL (ref 8.4–10.2)
CHLORIDE SERPL-SCNC: 102 MMOL/L (ref 96–108)
CO2 SERPL-SCNC: 30 MMOL/L (ref 21–32)
CREAT SERPL-MCNC: 0.86 MG/DL (ref 0.6–1.3)
FERRITIN SERPL-MCNC: 6 NG/ML (ref 11–307)
GFR SERPL CREATININE-BSD FRML MDRD: 75 ML/MIN/1.73SQ M
GLUCOSE P FAST SERPL-MCNC: 84 MG/DL (ref 65–99)
IRON SATN MFR SERPL: 55 % (ref 15–50)
IRON SERPL-MCNC: 157 UG/DL (ref 50–212)
POTASSIUM SERPL-SCNC: 3.9 MMOL/L (ref 3.5–5.3)
PROT SERPL-MCNC: 6.7 G/DL (ref 6.4–8.4)
SODIUM SERPL-SCNC: 140 MMOL/L (ref 135–147)
TIBC SERPL-MCNC: 288 UG/DL (ref 250–450)
UIBC SERPL-MCNC: 131 UG/DL (ref 155–355)

## 2024-03-12 PROCEDURE — 36415 COLL VENOUS BLD VENIPUNCTURE: CPT

## 2024-03-12 PROCEDURE — 83550 IRON BINDING TEST: CPT

## 2024-03-12 PROCEDURE — 82728 ASSAY OF FERRITIN: CPT

## 2024-03-12 PROCEDURE — 80048 BASIC METABOLIC PNL TOTAL CA: CPT

## 2024-03-12 PROCEDURE — 80076 HEPATIC FUNCTION PANEL: CPT

## 2024-03-12 PROCEDURE — 83540 ASSAY OF IRON: CPT

## 2024-03-13 DIAGNOSIS — E61.1 IRON DEFICIENCY: Primary | ICD-10-CM

## 2024-03-14 DIAGNOSIS — E61.1 IRON DEFICIENCY: Primary | ICD-10-CM

## 2024-03-15 ENCOUNTER — LAB (OUTPATIENT)
Dept: LAB | Facility: CLINIC | Age: 59
End: 2024-03-15
Payer: COMMERCIAL

## 2024-03-15 DIAGNOSIS — E61.1 IRON DEFICIENCY: ICD-10-CM

## 2024-03-15 LAB
ERYTHROCYTE [DISTWIDTH] IN BLOOD BY AUTOMATED COUNT: 14.5 % (ref 11.6–15.1)
FERRITIN SERPL-MCNC: 8 NG/ML (ref 11–307)
HCT VFR BLD AUTO: 37.1 % (ref 34.8–46.1)
HGB BLD-MCNC: 11.9 G/DL (ref 11.5–15.4)
IRON SATN MFR SERPL: 15 % (ref 15–50)
IRON SERPL-MCNC: 70 UG/DL (ref 50–212)
MCH RBC QN AUTO: 28.7 PG (ref 26.8–34.3)
MCHC RBC AUTO-ENTMCNC: 32.1 G/DL (ref 31.4–37.4)
MCV RBC AUTO: 89 FL (ref 82–98)
PLATELET # BLD AUTO: 276 THOUSANDS/UL (ref 149–390)
PMV BLD AUTO: 9.9 FL (ref 8.9–12.7)
RBC # BLD AUTO: 4.15 MILLION/UL (ref 3.81–5.12)
TIBC SERPL-MCNC: 464 UG/DL (ref 250–450)
UIBC SERPL-MCNC: 394 UG/DL (ref 155–355)
WBC # BLD AUTO: 4.6 THOUSAND/UL (ref 4.31–10.16)

## 2024-03-15 PROCEDURE — 82728 ASSAY OF FERRITIN: CPT

## 2024-03-15 PROCEDURE — 85027 COMPLETE CBC AUTOMATED: CPT

## 2024-03-15 PROCEDURE — 36415 COLL VENOUS BLD VENIPUNCTURE: CPT

## 2024-03-15 PROCEDURE — 83550 IRON BINDING TEST: CPT

## 2024-03-15 PROCEDURE — 83540 ASSAY OF IRON: CPT

## 2024-03-16 ENCOUNTER — HOSPITAL ENCOUNTER (OUTPATIENT)
Dept: MRI IMAGING | Facility: HOSPITAL | Age: 59
Discharge: HOME | End: 2024-03-16
Attending: UROLOGY
Payer: COMMERCIAL

## 2024-03-16 DIAGNOSIS — N28.89 RIGHT RENAL MASS: ICD-10-CM

## 2024-03-16 PROCEDURE — 74183 MRI ABD W/O CNTR FLWD CNTR: CPT

## 2024-03-16 PROCEDURE — A9585 GADOBUTROL INJECTION: HCPCS | Performed by: UROLOGY

## 2024-03-16 PROCEDURE — G1004 CDSM NDSC: HCPCS

## 2024-03-16 RX ORDER — GADOBUTROL 604.72 MG/ML
6 INJECTION INTRAVENOUS
Status: COMPLETED | OUTPATIENT
Start: 2024-03-16 | End: 2024-03-16

## 2024-03-16 RX ADMIN — GADOBUTROL 6 ML: 604.72 INJECTION INTRAVENOUS at 10:02

## 2024-03-20 ENCOUNTER — TELEMEDICINE (OUTPATIENT)
Dept: INTERNAL MEDICINE CLINIC | Facility: CLINIC | Age: 59
End: 2024-03-20
Payer: COMMERCIAL

## 2024-03-20 DIAGNOSIS — Z00.00 WELL ADULT EXAM: Primary | ICD-10-CM

## 2024-03-20 PROCEDURE — 99396 PREV VISIT EST AGE 40-64: CPT | Performed by: PHYSICIAN ASSISTANT

## 2024-03-20 RX ORDER — ALPHA LIPOIC ACID 200 MG
CAPSULE ORAL DAILY
COMMUNITY

## 2024-03-20 NOTE — PROGRESS NOTES
Virtual Regular Visit    Verification of patient location:    Patient is located at Other in the following state in which I hold an active license [place]      Assessment/Plan:    Problem List Items Addressed This Visit    None  Visit Diagnoses       Well adult exam    -  Primary              Depression Screening and Follow-up Plan: Patient was screened for depression during today's encounter. They screened negative with a PHQ-9 score of 2.        Reason for visit is   Chief Complaint   Patient presents with    Follow-up    Virtual Regular Visit          Encounter provider Debbie Flood PA-C    Provider located at [address] [address]      Recent Visits  No visits were found meeting these conditions.  Showing recent visits within past 7 days and meeting all other requirements  Today's Visits  Date Type Provider Dept   03/20/24 Telemedicine Debbie Flood PA-C [facility]   Showing today's visits and meeting all other requirements  Future Appointments  No visits were found meeting these conditions.  Showing future appointments within next 150 days and meeting all other requirements       The patient was identified by name and date of birth. Tiffani Lancaster was informed that this is a telemedicine visit and that the visit is being conducted through the Epic Embedded platform. She agrees to proceed..  My office door was closed. No one else was in the room.  She acknowledged consent and understanding of privacy and security of the video platform. The patient has agreed to participate and understands they can discontinue the visit at any time.    Patient is aware this is a billable service.     Subjective  Tiffani Lancaster is a 58 y.o. female  .      Pt presents for annual visit. She is doing well overall. Headaches remain controlled with emgality. She follows closely with urology and hematology. She will be starting iron infusions. She had  "a recent MRI but results are not back yet.          Past Medical History:   Diagnosis Date    Anxiety past year 2021 to present    Cancer (HCC) 2009    cervical    Cervical cancer (HCC)     Colon polyp 2021    Depression     GERD (gastroesophageal reflux disease)     Headache(784.0) 1978    migraines    Hernia 2016    Memory loss 6 months    I know what I want to say just can't remember.  Eventually d    Migraine     Renal cell carcinoma of right kidney (HCC) 03/25/2022       Past Surgical History:   Procedure Laterality Date    ABDOMINAL SURGERY  2016    intestinal blockage    COLONOSCOPY  2021    EYE SURGERY  2018    FRACTURE SURGERY  2018    wrist    HERNIA REPAIR  2016    HYSTERECTOMY      IR BIOPSY LIVER RANDOM  03/15/2022    IR CRYOABLATION  03/15/2022    LAPAROSCOPIC SMALL BOWEL RESECTION      WRIST SURGERY Right        Current Outpatient Medications   Medication Sig Dispense Refill    B Complex Vitamins (B Complex-B12) TABS Take by mouth daily      buPROPion (WELLBUTRIN XL) 300 mg 24 hr tablet TAKE 1 TABLET EVERY MORNING 90 tablet 3    cholecalciferol (VITAMIN D3) 1,000 units tablet Take 1 tablet (1,000 Units total) by mouth daily      Emgality 120 MG/ML SOSY Inject 1ml monthly 1 mL 0    escitalopram (LEXAPRO) 20 mg tablet TAKE 1 TABLET DAILY 90 tablet 3    ketorolac (TORADOL) 10 mg tablet take 1 tablet by mouth every 6 hours if needed for MODERATE PAIN ...  (REFER TO PRESCRIPTION NOTES).      LORazepam (Ativan) 0.5 mg tablet Take 1 tablet (0.5 mg total) by mouth 2 (two) times a day 60 tablet 0    lubiprostone (AMITIZA) 24 mcg capsule Take 1 capsule (24 mcg total) by mouth 2 (two) times a day with meals 180 capsule 3    rosuvastatin (CRESTOR) 10 MG tablet TAKE 1 TABLET DAILY 90 tablet 3    Syringe/Needle, Disp, (SYRINGE 3CC/21GX1\") 21G X 1\" 3 ML MISC Use in the morning 1 each 5    ursodiol (ACTIGALL) 300 mg capsule Take 1 capsule (300 mg total) by mouth 2 (two) times a day 180 capsule 3     Current " Facility-Administered Medications   Medication Dose Route Frequency Provider Last Rate Last Admin    ondansetron (ZOFRAN-ODT) dispersible tablet 4 mg  4 mg Oral Q6H PRN Shantell CARROLL RUPINDER Mclean   4 mg at 11/17/18 1717        No Known Allergies    Review of Systems   Constitutional:  Negative for chills and fever.   HENT:  Negative for congestion, ear pain, hearing loss, postnasal drip, rhinorrhea, sinus pressure, sinus pain, sore throat and trouble swallowing.    Eyes:  Negative for pain and visual disturbance.   Respiratory:  Negative for cough, chest tightness, shortness of breath and wheezing.    Cardiovascular: Negative.  Negative for chest pain, palpitations and leg swelling.   Gastrointestinal:  Negative for abdominal pain, blood in stool, constipation, diarrhea, nausea and vomiting.   Endocrine: Negative for cold intolerance, heat intolerance, polydipsia, polyphagia and polyuria.   Genitourinary:  Negative for difficulty urinating, dysuria, flank pain and urgency.   Musculoskeletal:  Negative for arthralgias, back pain, gait problem and myalgias.   Skin:  Negative for rash.   Allergic/Immunologic: Negative.    Neurological:  Negative for dizziness, weakness, light-headedness and headaches.   Hematological: Negative.    Psychiatric/Behavioral:  Negative for behavioral problems, dysphoric mood and sleep disturbance. The patient is not nervous/anxious.        Video Exam    Vitals:       Physical Exam  Nursing note reviewed.   Constitutional:       Appearance: She is well-developed.   HENT:      Head: Normocephalic and atraumatic.      Nose: Nose normal.   Eyes:      Extraocular Movements: Extraocular movements intact.      Pupils: Pupils are equal, round, and reactive to light.   Pulmonary:      Effort: Pulmonary effort is normal.   Musculoskeletal:         General: Normal range of motion.      Cervical back: Normal range of motion.   Neurological:      General: No focal deficit present.      Mental Status: She is  alert and oriented to person, place, and time.   Psychiatric:         Behavior: Behavior normal.         Thought Content: Thought content normal.         Judgment: Judgment normal.          Visit Time  Total Visit Duration: 15

## 2024-03-27 DIAGNOSIS — G43.719 CHRONIC MIGRAINE WITHOUT AURA, INTRACTABLE, WITHOUT STATUS MIGRAINOSUS: ICD-10-CM

## 2024-03-27 RX ORDER — GALCANEZUMAB 120 MG/ML
INJECTION, SOLUTION SUBCUTANEOUS
Qty: 3 ML | Refills: 1 | Status: SHIPPED | OUTPATIENT
Start: 2024-03-27

## 2024-04-02 ENCOUNTER — HOSPITAL ENCOUNTER (OUTPATIENT)
Dept: BONE DENSITY | Facility: HOSPITAL | Age: 59
Discharge: HOME | End: 2024-04-02
Payer: COMMERCIAL

## 2024-04-02 VITALS — WEIGHT: 140 LBS | BODY MASS INDEX: 19.6 KG/M2 | HEIGHT: 71 IN

## 2024-04-02 PROCEDURE — 77080 DXA BONE DENSITY AXIAL: CPT

## 2024-04-08 ENCOUNTER — CONSULT (OUTPATIENT)
Dept: HEMATOLOGY ONCOLOGY | Facility: CLINIC | Age: 59
End: 2024-04-08
Payer: COMMERCIAL

## 2024-04-08 ENCOUNTER — TELEPHONE (OUTPATIENT)
Dept: HEMATOLOGY ONCOLOGY | Facility: CLINIC | Age: 59
End: 2024-04-08

## 2024-04-08 VITALS
WEIGHT: 143 LBS | DIASTOLIC BLOOD PRESSURE: 60 MMHG | HEART RATE: 78 BPM | TEMPERATURE: 97.3 F | BODY MASS INDEX: 20.02 KG/M2 | HEIGHT: 71 IN | OXYGEN SATURATION: 96 % | SYSTOLIC BLOOD PRESSURE: 104 MMHG

## 2024-04-08 DIAGNOSIS — M81.0 AGE-RELATED OSTEOPOROSIS WITHOUT CURRENT PATHOLOGICAL FRACTURE: Primary | ICD-10-CM

## 2024-04-08 DIAGNOSIS — D50.9 IRON DEFICIENCY ANEMIA, UNSPECIFIED IRON DEFICIENCY ANEMIA TYPE: Primary | ICD-10-CM

## 2024-04-08 DIAGNOSIS — E53.8 B12 DEFICIENCY: ICD-10-CM

## 2024-04-08 PROBLEM — E61.1 IRON DEFICIENCY: Status: ACTIVE | Noted: 2024-04-08

## 2024-04-08 PROCEDURE — 99204 OFFICE O/P NEW MOD 45 MIN: CPT

## 2024-04-08 RX ORDER — SODIUM CHLORIDE 9 MG/ML
20 INJECTION, SOLUTION INTRAVENOUS ONCE
OUTPATIENT
Start: 2024-04-22

## 2024-04-08 NOTE — PROGRESS NOTES
[address]  [facility] [place]  [address]  [facility] [address]  Hematology Ambulatory Consult  Tiffani Lancaster, 1965, [id number]  4/8/2024    Assessment/Plan:  1. Iron deficiency anemia, unspecified iron deficiency anemia type  Ms. Lancaster is a 58-year-old female seen in consultation for iron deficiency.  She has had a longstanding history of iron deficiency that is not really responding to oral iron.  She does not tolerate increased doses of oral iron due to constipation.  Most recent iron panel demonstrated a ferritin of 8 with an iron saturation of 15% and TIBC of 464.  There is mild anemia associated with this with most recent hemoglobin being 11.9 with a baseline ranging from 12-13.  She is mildly symptomatic with fatigue, ice chewing, restless legs, and easy bruising.  Since she has not been responding or tolerating oral IV iron was recommended.  We discussed potential side effects which include but are not limited to IV site reactions, tattooing, hypotension, arthralgias, headache, nausea, and anaphylaxis.  If patient experiences any side effects they are to call the office to discuss premedications are necessary for subsequent dosing.  She will receive weekly IV Venofer 200 mg x 6 doses.  She will repeat labs in 3 months prior to follow-up to discuss if subsequent dosing of IV iron is recommended or discuss surveillance/maintenance.  She knows to call the office with new or worsening symptoms for sooner evaluation.    - Ambulatory Referral to Hematology / Oncology  - CBC and differential; Future  - Iron Panel (Includes Ferritin, Iron Sat%, Iron, and TIBC); Future  - Folate; Future  - Vitamin B12; Future  - Methylmalonic acid, serum; Future    2. B12 deficiency  In July 2023 B12 was low at 273.  She is taking a B supplement meant but is unsure of it is a B complex or the dosage of B12.  I suggested she have repeat B12 testing completed prior to starting the  infusions.  If her B12 remains low we discussed adding weekly B12 injections x 4 doses along with the IV iron.      The patient is scheduled for follow-up in approximately 3 months.   Patient voiced agreement and understanding to the above. Patient knows to call the Hematology/Oncology office with any questions and concerns regarding the above.      Barrier(s) to care: None.   The patient is able to self care.    -------------------------------------------------------------------------------------------------------    Chief Complaint   Patient presents with    Consult       Referring provider:  Naomi Ball MD  [address] [place] [address]  [address]  [place] [address]    History of present illness:  Tiffani Lancaster is a 58-year-old female with past medical history of migraines, GERD, biliarg cholangitis, cervical cancer status post hysterectomy, renal cell carcinoma status post cryoablation, depression, hyperlipidemia, and glaucoma.  She is seen in consultation today for iron deficiency.  She has had longstanding history of iron deficiency on and off oral iron supplementation.  This is never been very effective and she does not tolerate higher doses due to constipation.  She has never required IV iron previously.  She is unsure if she is ever needed blood transfusion.  She has a history of renal cell carcinoma status post cryoablation and follows with urology under surveillance with MRIs.  She also has a history of cervical cancer status post hysterectomy in 2009.  She has never required systemic treatment.  She denies ever being told that she or a family member is diagnosed with thalassemia.   She denies history of gastric bypass.  She has had surgery on her colon for a bowel blockage resulting in perforation, it is uncertain if a portion of the bowel was removed.  Her last colonoscopy was 2021, good for 5 years .  She was on Protonix for many years for GERD and has stopped this a few weeks ago.    She denies coffee  ground stools, tarry stools, bright red blood per rectum, hematuria, or menorrhagia. She denies any food intolerances.  She denies lightheadedness, dizziness, shortness of breath, BRENNAN, palpitations, or chest pain.  She denies fevers, chills, unintentional weight loss, abdominal pain/distension, nausea, vomiting, constipation, diarrhea, petechiae/purpura, or LE swelling.    Her current symptoms include ice chewing, restless legs, thinning hair, fatigue, and easy bruising.    Component  Ref Range & Units 3/15/24 10:41 AM 3/12/24 10:20 AM 7/17/23  1:50 PM 1/14/22  9:34 AM   Ferritin  11 - 307 ng/mL 8 Low  6 Low  6 Low  34 R              Component  Ref Range & Units 3/15/24 10:41 AM 3/12/24 10:20 AM 7/17/23  1:50 PM 1/14/22  9:34 AM   Iron Saturation  15 - 50 % 15 55 High  8 Low  15   TIBC  250 - 450 ug/dL 464 High  288 538 High  422   Iron  50 - 212 ug/dL 70 157 CM 42 Low  R, CM 64 R, CM   Comment: Patients treated with metal-binding drugs (ie. Deferoxamine) may have depressed iron values.   UIBC  155 - 355 ug/dL 394 High  131 Low               Review of Systems   Constitutional:  Positive for fatigue. Negative for activity change, appetite change, diaphoresis, fever and unexpected weight change.   HENT:  Negative for trouble swallowing and voice change.    Eyes:  Negative for photophobia and visual disturbance.   Respiratory:  Negative for cough, chest tightness and shortness of breath.    Cardiovascular:  Negative for chest pain, palpitations and leg swelling.   Gastrointestinal:  Negative for abdominal distention, abdominal pain, anal bleeding, blood in stool, constipation, diarrhea, nausea and vomiting.   Endocrine: Negative for cold intolerance and heat intolerance.   Genitourinary:  Negative for dysuria, hematuria and urgency.   Musculoskeletal:  Negative for arthralgias, back pain, gait problem and joint swelling.   Skin:  Negative for pallor and rash.   Neurological:  Negative for dizziness, weakness,  light-headedness, numbness and headaches.   Hematological:  Negative for adenopathy. Bruises/bleeds easily.   Psychiatric/Behavioral:  Negative for confusion and sleep disturbance.        Patient Active Problem List   Diagnosis    Depression    Hyperlipidemia    Migraine headache    Vitamin D deficiency    Angle-closure glaucoma, moderate stage    Panic attacks    Gastroesophageal reflux disease without esophagitis    Chronic migraine without aura, intractable, without status migrainosus    Dependent edema    Other hemoglobinopathies (HCC)    Weight gain    Chronic constipation    Malignant neoplasm of overlapping sites of cervix (HCC)    Primary biliary cholangitis (HCC)    Renal cell carcinoma of right kidney (HCC)    Iron deficiency       Past Medical History:   Diagnosis Date    Anxiety past year 2021 to present    Cancer (HCC) 2009    cervical    Cervical cancer (HCC)     Colon polyp 2021    Depression     GERD (gastroesophageal reflux disease)     Headache(784.0) 1978    migraines    Hernia 2016    Memory loss 6 months    I know what I want to say just can't remember.  Eventually d    Migraine     Renal cell carcinoma of right kidney (HCC) 03/25/2022       Past Surgical History:   Procedure Laterality Date    ABDOMINAL SURGERY  2016    intestinal blockage    COLONOSCOPY  2021    EYE SURGERY  2018    FRACTURE SURGERY  2018    wrist    HERNIA REPAIR  2016    HYSTERECTOMY      IR BIOPSY LIVER RANDOM  03/15/2022    IR CRYOABLATION  03/15/2022    LAPAROSCOPIC SMALL BOWEL RESECTION      WRIST SURGERY Right        Family History   Problem Relation Age of Onset    Arthritis Mother     Dementia Mother     Glaucoma Mother     Diabetes Father     Cancer Sister         kidney    Colon polyps Brother         polyps    Depression Daughter     Cancer Maternal Uncle         kidney    Anxiety disorder Sister     Esophageal cancer Neg Hx        Social History     Socioeconomic History    Marital status: /Civil Union      Spouse name: None    Number of children: None    Years of education: None    Highest education level: None   Occupational History    Occupation:    Tobacco Use    Smoking status: Never     Passive exposure: Never    Smokeless tobacco: Never   Vaping Use    Vaping status: Never Used   Substance and Sexual Activity    Alcohol use: Yes     Comment: socially, RARELY    Drug use: No    Sexual activity: Yes     Partners: Male     Birth control/protection: None   Other Topics Concern    None   Social History Narrative    None     Social Determinants of Health     Financial Resource Strain: Low Risk  (4/13/2023)    Received from [facility]    Overall Financial Resource Strain (CARDIA)     Difficulty of Paying Living Expenses: Not very hard   Food Insecurity: No Food Insecurity (4/13/2023)    Received from [facility]    Hunger Vital Sign     Worried About Running Out of Food in the Last Year: Never true     Ran Out of Food in the Last Year: Never true   Transportation Needs: No Transportation Needs (4/13/2023)    Received from [facility]    PRAPARE - Transportation     Lack of Transportation (Medical): No     Lack of Transportation (Non-Medical): No   Physical Activity: Not on file   Stress: Not on file   Social Connections: Not on file   Intimate Partner Violence: Not At Risk (4/13/2023)    Received from [facility]    Humiliation, Afraid, Rape, and Kick questionnaire     Fear of Current or Ex-Partner: No     Emotionally Abused: No     Physically Abused: No     Sexually Abused: No   Housing Stability: Low Risk  (4/13/2023)    Received from [facility]    Housing Stability Vital Sign     Unable to Pay for Housing in the Last Year: No     Number of Places Lived in the Last Year: 1     Unstable Housing in the Last Year: No         Current Outpatient Medications:     B Complex Vitamins (B Complex-B12) TABS, Take by mouth daily,  "Disp: , Rfl:     buPROPion (WELLBUTRIN XL) 300 mg 24 hr tablet, TAKE 1 TABLET EVERY MORNING, Disp: 90 tablet, Rfl: 3    cholecalciferol (VITAMIN D3) 1,000 units tablet, Take 1 tablet (1,000 Units total) by mouth daily, Disp: , Rfl:     denosumab (PROLIA) 60 mg/mL, Inject 60 mg under the skin once, Disp: , Rfl:     Emgality 120 MG/ML SOAJ, INJECT 1ML MONTHLY, Disp: 3 mL, Rfl: 1    escitalopram (LEXAPRO) 20 mg tablet, TAKE 1 TABLET DAILY, Disp: 90 tablet, Rfl: 3    ketorolac (TORADOL) 10 mg tablet, take 1 tablet by mouth every 6 hours if needed for MODERATE PAIN ...  (REFER TO PRESCRIPTION NOTES)., Disp: , Rfl:     LORazepam (Ativan) 0.5 mg tablet, Take 1 tablet (0.5 mg total) by mouth 2 (two) times a day, Disp: 60 tablet, Rfl: 0    lubiprostone (AMITIZA) 24 mcg capsule, Take 1 capsule (24 mcg total) by mouth 2 (two) times a day with meals, Disp: 180 capsule, Rfl: 3    rosuvastatin (CRESTOR) 10 MG tablet, TAKE 1 TABLET DAILY, Disp: 90 tablet, Rfl: 3    Syringe/Needle, Disp, (SYRINGE 3CC/21GX1\") 21G X 1\" 3 ML MISC, Use in the morning, Disp: 1 each, Rfl: 5    ursodiol (ACTIGALL) 300 mg capsule, Take 1 capsule (300 mg total) by mouth 2 (two) times a day, Disp: 180 capsule, Rfl: 3    Current Facility-Administered Medications:     ondansetron (ZOFRAN-ODT) dispersible tablet 4 mg, 4 mg, Oral, Q6H PRN, RUPINDER Woodard, 4 mg at 11/17/18 1717    No Known Allergies    Objective:  /60 (BP Location: Right arm, Patient Position: Sitting, Cuff Size: Standard)   Pulse 78   Temp (!) 97.3 °F (36.3 °C) (Temporal)   Ht 5' 11\" (1.803 m)   Wt 64.9 kg (143 lb)   SpO2 96%   BMI 19.94 kg/m²   Physical Exam  Constitutional:       General: She is not in acute distress.     Appearance: Normal appearance. She is not ill-appearing.   HENT:      Head: Normocephalic.   Eyes:      Extraocular Movements: Extraocular movements intact.      Conjunctiva/sclera: Conjunctivae normal.   Cardiovascular:      Rate and Rhythm: Normal rate.    "   Pulses: Normal pulses.   Pulmonary:      Effort: Pulmonary effort is normal. No respiratory distress.   Abdominal:      General: Bowel sounds are normal. There is no distension.      Tenderness: There is no abdominal tenderness.   Musculoskeletal:      Cervical back: Normal range of motion.      Right lower leg: No edema.      Left lower leg: No edema.   Skin:     General: Skin is warm and dry.      Capillary Refill: Capillary refill takes less than 2 seconds.      Coloration: Skin is not jaundiced or pale.   Neurological:      General: No focal deficit present.      Mental Status: She is alert and oriented to person, place, and time. Mental status is at baseline.      Motor: No weakness.      Gait: Gait normal.   Psychiatric:         Mood and Affect: Mood normal.         Behavior: Behavior normal.         Thought Content: Thought content normal.         Judgment: Judgment normal.         Result Review  Labs:   Lab on 03/15/2024   Component Date Value Ref Range Status    WBC 03/15/2024 4.60  4.31 - 10.16 Thousand/uL Final    RBC 03/15/2024 4.15  3.81 - 5.12 Million/uL Final    Hemoglobin 03/15/2024 11.9  11.5 - 15.4 g/dL Final    Hematocrit 03/15/2024 37.1  34.8 - 46.1 % Final    MCV 03/15/2024 89  82 - 98 fL Final    MCH 03/15/2024 28.7  26.8 - 34.3 pg Final    MCHC 03/15/2024 32.1  31.4 - 37.4 g/dL Final    RDW 03/15/2024 14.5  11.6 - 15.1 % Final    Platelets 03/15/2024 276  149 - 390 Thousands/uL Final    MPV 03/15/2024 9.9  8.9 - 12.7 fL Final    Iron Saturation 03/15/2024 15  15 - 50 % Final    TIBC 03/15/2024 464 (H)  250 - 450 ug/dL Final    Iron 03/15/2024 70  50 - 212 ug/dL Final    Patients treated with metal-binding drugs (ie. Deferoxamine) may have depressed iron values.    UIBC 03/15/2024 394 (H)  155 - 355 ug/dL Final    Ferritin 03/15/2024 8 (L)  11 - 307 ng/mL Final   Appointment on 03/12/2024   Component Date Value Ref Range Status    Sodium 03/12/2024 140  135 - 147 mmol/L Final    Potassium  03/12/2024 3.9  3.5 - 5.3 mmol/L Final    Chloride 03/12/2024 102  96 - 108 mmol/L Final    CO2 03/12/2024 30  21 - 32 mmol/L Final    ANION GAP 03/12/2024 8  4 - 13 mmol/L Final    BUN 03/12/2024 11  5 - 25 mg/dL Final    Creatinine 03/12/2024 0.86  0.60 - 1.30 mg/dL Final    Standardized to IDMS reference method    Glucose, Fasting 03/12/2024 84  65 - 99 mg/dL Final    Calcium 03/12/2024 9.6  8.4 - 10.2 mg/dL Final    eGFR 03/12/2024 75  ml/min/1.73sq m Final    Total Bilirubin 03/12/2024 0.53  0.20 - 1.00 mg/dL Final    Use of this assay is not recommended for patients undergoing treatment with eltrombopag due to the potential for falsely elevated results.  N-acetyl-p-benzoquinone imine (metabolite of Acetaminophen) will generate erroneously low results in samples for patients that have taken an overdose of Acetaminophen.    Bilirubin, Direct 03/12/2024 0.08  0.00 - 0.20 mg/dL Final    Alkaline Phosphatase 03/12/2024 72  34 - 104 U/L Final    AST 03/12/2024 19  13 - 39 U/L Final    ALT 03/12/2024 12  7 - 52 U/L Final    Specimen collection should occur prior to Sulfasalazine administration due to the potential for falsely depressed results.     Total Protein 03/12/2024 6.7  6.4 - 8.4 g/dL Final    Albumin 03/12/2024 4.4  3.5 - 5.0 g/dL Final    Iron Saturation 03/12/2024 55 (H)  15 - 50 % Final    TIBC 03/12/2024 288  250 - 450 ug/dL Final    Iron 03/12/2024 157  50 - 212 ug/dL Final    Patients treated with metal-binding drugs (ie. Deferoxamine) may have depressed iron values.    UIBC 03/12/2024 131 (L)  155 - 355 ug/dL Final    Ferritin 03/12/2024 6 (L)  11 - 307 ng/mL Final       Imaging:  no relevant imaging to review      Please note:  This report has been generated by a voice recognition software system. Therefore there may be syntax, spelling, and/or grammatical errors. Please call if you have any questions.

## 2024-04-10 ENCOUNTER — APPOINTMENT (OUTPATIENT)
Dept: LAB | Facility: CLINIC | Age: 59
End: 2024-04-10
Payer: COMMERCIAL

## 2024-04-10 DIAGNOSIS — E53.8 B12 DEFICIENCY: ICD-10-CM

## 2024-04-10 LAB — VIT B12 SERPL-MCNC: 1299 PG/ML (ref 180–914)

## 2024-04-10 PROCEDURE — 82607 VITAMIN B-12: CPT

## 2024-04-10 PROCEDURE — 36415 COLL VENOUS BLD VENIPUNCTURE: CPT

## 2024-04-11 ENCOUNTER — TELEPHONE (OUTPATIENT)
Dept: INTERNAL MEDICINE CLINIC | Facility: CLINIC | Age: 59
End: 2024-04-11

## 2024-04-16 ENCOUNTER — TELEPHONE (OUTPATIENT)
Dept: INTERNAL MEDICINE CLINIC | Facility: CLINIC | Age: 59
End: 2024-04-16

## 2024-04-19 ENCOUNTER — TELEMEDICINE (OUTPATIENT)
Dept: UROLOGY | Facility: MEDICAL CENTER | Age: 59
End: 2024-04-19
Payer: COMMERCIAL

## 2024-04-19 DIAGNOSIS — C64.1 RENAL CELL CARCINOMA OF RIGHT KIDNEY (HCC): Primary | ICD-10-CM

## 2024-04-19 PROCEDURE — 99213 OFFICE O/P EST LOW 20 MIN: CPT | Performed by: PHYSICIAN ASSISTANT

## 2024-04-19 NOTE — PROGRESS NOTES
Virtual Brief Visit    This Visit is being completed by telephone. The Patient is located at Home and in the following state in which I hold an active license [place]    The patient was identified by name and date of birth. Tiffani Lancaster was informed that this is a telemedicine visit and that the visit is being conducted through the [employer] Teams platform. She agrees to proceed..  My office door was closed. No one else was in the room.  She acknowledged consent and understanding of privacy and security of the video platform. The patient has agreed to participate and understands they can discontinue the visit at any time.    Patient is aware this is a billable service.   Visit performed via telephone as I was 22 minutes late to her appointment time and the video had timed out. I apologized for my delay.       Assessment/Plan:    Right renal cell carcinoma- bx proven 3/15/22  Cryoablation 3/15/22    Now 2 years s/p biopsy and cryoablation of small 1.3cm right renal mass confirmed clear cell renal cell carcinoma. Posttreatment images show no evidence of disease including MRI abdomen few weeks ago. Recommend continued annual surveillance per NCCN guidelines with CXR and MRI abdomen again in 12 months, yearly for 5 years.    Problem List Items Addressed This Visit        Genitourinary    Renal cell carcinoma of right kidney (HCC) - Primary    Relevant Orders    XR chest pa & lateral    MRI abdomen w wo contrast    Basic metabolic panel         Recent Visits  No visits were found meeting these conditions.  Showing recent visits within past 7 days and meeting all other requirements  Today's Visits  Date Type Provider Dept   04/19/24 Telemedicine Yoselyn Ivy PA-C  [facility]   Showing today's visits and meeting all other requirements  Future Appointments  No visits were found meeting these conditions.  Showing future appointments within next 150 days and meeting all other requirements         Visit  Time  Total Visit Duration: 7 minutes

## 2024-04-25 ENCOUNTER — TELEPHONE (OUTPATIENT)
Dept: INFUSION CENTER | Facility: CLINIC | Age: 59
End: 2024-04-25

## 2024-04-25 NOTE — TELEPHONE ENCOUNTER
Called and left VM with patient to confirm appointment for Venofer at 10:00. Discussed location of the infusion center, projected length of appointment, visitor policy, and availability of snacks, drinks, and WiFi. Provided call back number of [phone] with questions.

## 2024-04-26 ENCOUNTER — HOSPITAL ENCOUNTER (OUTPATIENT)
Dept: INFUSION CENTER | Facility: CLINIC | Age: 59
End: 2024-04-26
Payer: COMMERCIAL

## 2024-04-26 ENCOUNTER — HOSPITAL ENCOUNTER (OUTPATIENT)
Dept: INFUSION CENTER | Facility: CLINIC | Age: 59
End: 2024-04-26

## 2024-04-26 VITALS
DIASTOLIC BLOOD PRESSURE: 70 MMHG | TEMPERATURE: 98.8 F | SYSTOLIC BLOOD PRESSURE: 108 MMHG | RESPIRATION RATE: 18 BRPM | HEART RATE: 80 BPM

## 2024-04-26 DIAGNOSIS — E61.1 IRON DEFICIENCY: Primary | ICD-10-CM

## 2024-04-26 PROCEDURE — 96365 THER/PROPH/DIAG IV INF INIT: CPT

## 2024-04-26 RX ORDER — SODIUM CHLORIDE 9 MG/ML
20 INJECTION, SOLUTION INTRAVENOUS ONCE
Status: CANCELLED | OUTPATIENT
Start: 2024-05-03

## 2024-04-26 RX ORDER — SODIUM CHLORIDE 9 MG/ML
20 INJECTION, SOLUTION INTRAVENOUS ONCE
Status: COMPLETED | OUTPATIENT
Start: 2024-04-26 | End: 2024-04-26

## 2024-04-26 RX ADMIN — IRON SUCROSE 200 MG: 20 INJECTION, SOLUTION INTRAVENOUS at 10:02

## 2024-04-26 RX ADMIN — SODIUM CHLORIDE 20 ML/HR: 0.9 INJECTION, SOLUTION INTRAVENOUS at 10:01

## 2024-04-26 NOTE — PROGRESS NOTES
Pt arrived to unit without complaint, tolerated Venofer infusion without any adverse reaction. Pt left unit in stable condition without question or concern, AVS declined, aware of next appt 5/3 5891

## 2024-05-03 ENCOUNTER — HOSPITAL ENCOUNTER (OUTPATIENT)
Dept: INFUSION CENTER | Facility: CLINIC | Age: 59
Discharge: HOME | End: 2024-05-03
Payer: COMMERCIAL

## 2024-05-03 VITALS
HEART RATE: 72 BPM | SYSTOLIC BLOOD PRESSURE: 111 MMHG | TEMPERATURE: 98.3 F | DIASTOLIC BLOOD PRESSURE: 69 MMHG | RESPIRATION RATE: 18 BRPM

## 2024-05-03 DIAGNOSIS — E61.1 IRON DEFICIENCY: Primary | ICD-10-CM

## 2024-05-03 PROCEDURE — 96365 THER/PROPH/DIAG IV INF INIT: CPT

## 2024-05-03 RX ORDER — SODIUM CHLORIDE 9 MG/ML
20 INJECTION, SOLUTION INTRAVENOUS ONCE
OUTPATIENT
Start: 2024-05-10

## 2024-05-03 RX ORDER — SODIUM CHLORIDE 9 MG/ML
20 INJECTION, SOLUTION INTRAVENOUS ONCE
Status: COMPLETED | OUTPATIENT
Start: 2024-05-03 | End: 2024-05-03

## 2024-05-03 RX ADMIN — IRON SUCROSE 200 MG: 20 INJECTION, SOLUTION INTRAVENOUS at 10:47

## 2024-05-03 RX ADMIN — SODIUM CHLORIDE 20 ML/HR: 0.9 INJECTION, SOLUTION INTRAVENOUS at 10:45

## 2024-05-03 NOTE — PROGRESS NOTES
Pt tolerated venofer today without complications. Confirmed appt back Friday 5-10-24 9:30, AVS provided.

## 2024-05-09 ENCOUNTER — CLINICAL SUPPORT (OUTPATIENT)
Dept: INTERNAL MEDICINE CLINIC | Facility: CLINIC | Age: 59
End: 2024-05-09
Payer: COMMERCIAL

## 2024-05-09 DIAGNOSIS — M81.0 AGE-RELATED OSTEOPOROSIS WITHOUT CURRENT PATHOLOGICAL FRACTURE: Primary | ICD-10-CM

## 2024-05-09 PROCEDURE — 96372 THER/PROPH/DIAG INJ SC/IM: CPT

## 2024-05-10 ENCOUNTER — HOSPITAL ENCOUNTER (OUTPATIENT)
Dept: INFUSION CENTER | Facility: CLINIC | Age: 59
End: 2024-05-10
Payer: COMMERCIAL

## 2024-05-10 VITALS
TEMPERATURE: 96.6 F | DIASTOLIC BLOOD PRESSURE: 73 MMHG | HEART RATE: 81 BPM | RESPIRATION RATE: 18 BRPM | SYSTOLIC BLOOD PRESSURE: 113 MMHG

## 2024-05-10 DIAGNOSIS — E61.1 IRON DEFICIENCY: Primary | ICD-10-CM

## 2024-05-10 PROCEDURE — 96365 THER/PROPH/DIAG IV INF INIT: CPT

## 2024-05-10 RX ORDER — SODIUM CHLORIDE 9 MG/ML
20 INJECTION, SOLUTION INTRAVENOUS ONCE
Status: CANCELLED | OUTPATIENT
Start: 2024-05-15

## 2024-05-10 RX ORDER — SODIUM CHLORIDE 9 MG/ML
20 INJECTION, SOLUTION INTRAVENOUS ONCE
Status: COMPLETED | OUTPATIENT
Start: 2024-05-10 | End: 2024-05-10

## 2024-05-10 RX ADMIN — SODIUM CHLORIDE 20 ML/HR: 9 INJECTION, SOLUTION INTRAVENOUS at 09:57

## 2024-05-10 RX ADMIN — IRON SUCROSE 200 MG: 20 INJECTION, SOLUTION INTRAVENOUS at 09:57

## 2024-05-10 NOTE — PROGRESS NOTES
Pt presents for venofer. No new meds or concerns. Pt tolerated treatment without adverse reaction. Future appointments discussed, confirmed with patient for 5/15/2024 1030. AVS declined.

## 2024-05-13 DIAGNOSIS — E61.1 IRON DEFICIENCY: Primary | ICD-10-CM

## 2024-05-15 ENCOUNTER — HOSPITAL ENCOUNTER (OUTPATIENT)
Dept: INFUSION CENTER | Facility: CLINIC | Age: 59
Discharge: HOME | End: 2024-05-15
Payer: COMMERCIAL

## 2024-05-15 VITALS
SYSTOLIC BLOOD PRESSURE: 115 MMHG | DIASTOLIC BLOOD PRESSURE: 67 MMHG | RESPIRATION RATE: 18 BRPM | TEMPERATURE: 98.6 F | HEART RATE: 73 BPM

## 2024-05-15 DIAGNOSIS — E61.1 IRON DEFICIENCY: Primary | ICD-10-CM

## 2024-05-15 PROCEDURE — 96365 THER/PROPH/DIAG IV INF INIT: CPT

## 2024-05-15 RX ORDER — SODIUM CHLORIDE 9 MG/ML
20 INJECTION, SOLUTION INTRAVENOUS ONCE
Status: CANCELLED | OUTPATIENT
Start: 2024-05-17

## 2024-05-15 RX ORDER — SODIUM CHLORIDE 9 MG/ML
20 INJECTION, SOLUTION INTRAVENOUS ONCE
Status: COMPLETED | OUTPATIENT
Start: 2024-05-15 | End: 2024-05-15

## 2024-05-15 RX ORDER — SODIUM CHLORIDE 9 MG/ML
20 INJECTION, SOLUTION INTRAVENOUS ONCE
Status: CANCELLED | OUTPATIENT
Start: 2024-05-23

## 2024-05-15 RX ADMIN — SODIUM CHLORIDE 20 ML/HR: 9 INJECTION, SOLUTION INTRAVENOUS at 10:31

## 2024-05-15 RX ADMIN — IRON SUCROSE 200 MG: 20 INJECTION, SOLUTION INTRAVENOUS at 10:31

## 2024-05-15 NOTE — PROGRESS NOTES
Pt here for venofer, stated she has been having some diarrhea since starting, only two episodes the day after.  Tried running venofer over 1 hour today to see if any benefits, tolerated well without complications. Confirmed next appt thur 5-23 10:30 , AVS provided.

## 2024-05-22 DIAGNOSIS — E78.5 HYPERLIPIDEMIA, UNSPECIFIED HYPERLIPIDEMIA TYPE: ICD-10-CM

## 2024-05-22 RX ORDER — ROSUVASTATIN CALCIUM 10 MG/1
TABLET, COATED ORAL
Qty: 90 TABLET | Refills: 1 | Status: SHIPPED | OUTPATIENT
Start: 2024-05-22

## 2024-05-23 ENCOUNTER — HOSPITAL ENCOUNTER (OUTPATIENT)
Dept: INFUSION CENTER | Facility: CLINIC | Age: 59
Discharge: HOME | End: 2024-05-23
Payer: COMMERCIAL

## 2024-05-23 VITALS
DIASTOLIC BLOOD PRESSURE: 70 MMHG | SYSTOLIC BLOOD PRESSURE: 106 MMHG | TEMPERATURE: 98.2 F | RESPIRATION RATE: 18 BRPM | HEART RATE: 67 BPM

## 2024-05-23 DIAGNOSIS — E61.1 IRON DEFICIENCY: Primary | ICD-10-CM

## 2024-05-23 PROCEDURE — 96365 THER/PROPH/DIAG IV INF INIT: CPT

## 2024-05-23 RX ORDER — SODIUM CHLORIDE 9 MG/ML
20 INJECTION, SOLUTION INTRAVENOUS ONCE
Status: COMPLETED | OUTPATIENT
Start: 2024-05-23 | End: 2024-05-23

## 2024-05-23 RX ORDER — SODIUM CHLORIDE 9 MG/ML
20 INJECTION, SOLUTION INTRAVENOUS ONCE
Status: CANCELLED | OUTPATIENT
Start: 2024-05-30

## 2024-05-23 RX ADMIN — IRON SUCROSE 200 MG: 20 INJECTION, SOLUTION INTRAVENOUS at 10:35

## 2024-05-23 RX ADMIN — SODIUM CHLORIDE 20 ML/HR: 0.9 INJECTION, SOLUTION INTRAVENOUS at 10:31

## 2024-05-23 NOTE — PROGRESS NOTES
Tiffani Lancaster  tolerated Venofer without incident.      Tiffani Lancaster is aware of future appt on 5/30 at 10am.     AVS printed and given to Tiffani Lancaster:    No (Declined by Tiffani Lancaster)

## 2024-05-30 ENCOUNTER — HOSPITAL ENCOUNTER (OUTPATIENT)
Dept: INFUSION CENTER | Facility: CLINIC | Age: 59
Discharge: HOME | End: 2024-05-30
Payer: COMMERCIAL

## 2024-05-30 VITALS
RESPIRATION RATE: 18 BRPM | DIASTOLIC BLOOD PRESSURE: 76 MMHG | HEART RATE: 63 BPM | SYSTOLIC BLOOD PRESSURE: 113 MMHG | TEMPERATURE: 98.7 F

## 2024-05-30 DIAGNOSIS — D50.9 IRON DEFICIENCY ANEMIA, UNSPECIFIED IRON DEFICIENCY ANEMIA TYPE: ICD-10-CM

## 2024-05-30 DIAGNOSIS — E61.1 IRON DEFICIENCY: Primary | ICD-10-CM

## 2024-05-30 DIAGNOSIS — E53.8 B12 DEFICIENCY: ICD-10-CM

## 2024-05-30 PROCEDURE — 96365 THER/PROPH/DIAG IV INF INIT: CPT

## 2024-05-30 RX ORDER — SODIUM CHLORIDE 9 MG/ML
20 INJECTION, SOLUTION INTRAVENOUS ONCE
Status: COMPLETED | OUTPATIENT
Start: 2024-05-30 | End: 2024-05-30

## 2024-05-30 RX ORDER — SODIUM CHLORIDE 9 MG/ML
20 INJECTION, SOLUTION INTRAVENOUS ONCE
OUTPATIENT
Start: 2024-06-06

## 2024-05-30 RX ADMIN — SODIUM CHLORIDE 20 ML/HR: 0.9 INJECTION, SOLUTION INTRAVENOUS at 09:58

## 2024-05-30 RX ADMIN — IRON SUCROSE 200 MG: 20 INJECTION, SOLUTION INTRAVENOUS at 09:58

## 2024-05-30 NOTE — PROGRESS NOTES
Confirmed with Dr. Morrison that pt has in fact finished her cycle of venofer and should have labs drawn prior to seeing Faustino in July.

## 2024-05-30 NOTE — PROGRESS NOTES
Pt tolerated venofer infusion without incident.  Pt declined AVS.  Pt is aware this is her last scheduled venofer infusion.

## 2024-05-30 NOTE — PROGRESS NOTES
Reviewed with Dr. Morrison, patient to have 6 doses of venofer with repeat labs prior to appointment in July. Reviewed with BRITTNEE Topete at [facility].

## 2024-05-31 DIAGNOSIS — F32.A DEPRESSION, UNSPECIFIED DEPRESSION TYPE: ICD-10-CM

## 2024-05-31 RX ORDER — BUPROPION HYDROCHLORIDE 300 MG/1
TABLET ORAL
Qty: 90 TABLET | Refills: 1 | Status: SHIPPED | OUTPATIENT
Start: 2024-05-31

## 2024-06-24 ENCOUNTER — TELEPHONE (OUTPATIENT)
Dept: HEMATOLOGY ONCOLOGY | Facility: CLINIC | Age: 59
End: 2024-06-24

## 2024-06-24 NOTE — TELEPHONE ENCOUNTER
OK for virtual but must complete labs before. If they are not completed, appt will be rescheduled.

## 2024-06-24 NOTE — TELEPHONE ENCOUNTER
Patient Call    Who are you speaking with? Patient    If it is not the patient, are they listed on an active communication consent form? N/A   What is the reason for this call? Patient is asking if her 7/10/24 appointment can be made into a virtual appointment due to conflicts   Does this require a call back? Yes   If a call back is required, please list [facility] call back number [phone]   If a call back is required, advise that a message will be forwarded to their care team and someone will return their call as soon as possible.   Did you relay this information to the patient? Yes

## 2024-06-24 NOTE — TELEPHONE ENCOUNTER
Called patient and left voicemail letting her know that we will change this to virtual and to make sure labs are completed before visit. Provided patient with call back number if she had any questions.

## 2024-07-06 ENCOUNTER — APPOINTMENT (OUTPATIENT)
Dept: LAB | Facility: CLINIC | Age: 59
End: 2024-07-06
Payer: COMMERCIAL

## 2024-07-06 DIAGNOSIS — E53.8 B12 DEFICIENCY: ICD-10-CM

## 2024-07-06 DIAGNOSIS — E61.1 IRON DEFICIENCY: ICD-10-CM

## 2024-07-06 DIAGNOSIS — D50.9 IRON DEFICIENCY ANEMIA, UNSPECIFIED IRON DEFICIENCY ANEMIA TYPE: ICD-10-CM

## 2024-07-06 LAB
BASOPHILS # BLD AUTO: 0.04 THOUSANDS/ÂΜL (ref 0–0.1)
BASOPHILS NFR BLD AUTO: 1 % (ref 0–1)
EOSINOPHIL # BLD AUTO: 0.11 THOUSAND/ÂΜL (ref 0–0.61)
EOSINOPHIL NFR BLD AUTO: 3 % (ref 0–6)
ERYTHROCYTE [DISTWIDTH] IN BLOOD BY AUTOMATED COUNT: 13.9 % (ref 11.6–15.1)
FERRITIN SERPL-MCNC: 175 NG/ML (ref 11–307)
FOLATE SERPL-MCNC: 4.7 NG/ML
HCT VFR BLD AUTO: 40.2 % (ref 34.8–46.1)
HGB BLD-MCNC: 13.3 G/DL (ref 11.5–15.4)
IMM GRANULOCYTES # BLD AUTO: 0.01 THOUSAND/UL (ref 0–0.2)
IMM GRANULOCYTES NFR BLD AUTO: 0 % (ref 0–2)
IRON SATN MFR SERPL: 43 % (ref 15–50)
IRON SERPL-MCNC: 142 UG/DL (ref 50–212)
LYMPHOCYTES # BLD AUTO: 2.17 THOUSANDS/ÂΜL (ref 0.6–4.47)
LYMPHOCYTES NFR BLD AUTO: 53 % (ref 14–44)
MCH RBC QN AUTO: 30.8 PG (ref 26.8–34.3)
MCHC RBC AUTO-ENTMCNC: 33.1 G/DL (ref 31.4–37.4)
MCV RBC AUTO: 93 FL (ref 82–98)
MONOCYTES # BLD AUTO: 0.32 THOUSAND/ÂΜL (ref 0.17–1.22)
MONOCYTES NFR BLD AUTO: 8 % (ref 4–12)
NEUTROPHILS # BLD AUTO: 1.44 THOUSANDS/ÂΜL (ref 1.85–7.62)
NEUTS SEG NFR BLD AUTO: 35 % (ref 43–75)
NRBC BLD AUTO-RTO: 0 /100 WBCS
PLATELET # BLD AUTO: 215 THOUSANDS/UL (ref 149–390)
PMV BLD AUTO: 10.1 FL (ref 8.9–12.7)
RBC # BLD AUTO: 4.32 MILLION/UL (ref 3.81–5.12)
TIBC SERPL-MCNC: 327 UG/DL (ref 250–450)
UIBC SERPL-MCNC: 185 UG/DL (ref 155–355)
VIT B12 SERPL-MCNC: 1879 PG/ML (ref 180–914)
WBC # BLD AUTO: 4.09 THOUSAND/UL (ref 4.31–10.16)

## 2024-07-06 PROCEDURE — 83550 IRON BINDING TEST: CPT

## 2024-07-06 PROCEDURE — 83540 ASSAY OF IRON: CPT

## 2024-07-06 PROCEDURE — 36415 COLL VENOUS BLD VENIPUNCTURE: CPT

## 2024-07-06 PROCEDURE — 83918 ORGANIC ACIDS TOTAL QUANT: CPT

## 2024-07-06 PROCEDURE — 82728 ASSAY OF FERRITIN: CPT

## 2024-07-06 PROCEDURE — 85025 COMPLETE CBC W/AUTO DIFF WBC: CPT

## 2024-07-06 PROCEDURE — 82746 ASSAY OF FOLIC ACID SERUM: CPT

## 2024-07-06 PROCEDURE — 82607 VITAMIN B-12: CPT

## 2024-07-10 ENCOUNTER — TELEMEDICINE (OUTPATIENT)
Dept: HEMATOLOGY ONCOLOGY | Facility: CLINIC | Age: 59
End: 2024-07-10
Payer: COMMERCIAL

## 2024-07-10 DIAGNOSIS — E61.1 IRON DEFICIENCY: Primary | ICD-10-CM

## 2024-07-10 DIAGNOSIS — E53.8 FOLIC ACID DEFICIENCY: ICD-10-CM

## 2024-07-10 LAB — METHYLMALONATE SERPL-SCNC: 89 NMOL/L (ref 0–378)

## 2024-07-10 PROCEDURE — 99213 OFFICE O/P EST LOW 20 MIN: CPT | Performed by: PHYSICIAN ASSISTANT

## 2024-07-10 NOTE — PROGRESS NOTES
"Virtual Regular Visit  Name: Tiffani Lancaster      : 1965      MRN: [id number]  Encounter Provider: Yamel David PA-C  Encounter Date: 7/10/2024   Encounter department: [facility] [place]    Verification of patient location:    Patient is located at Home in the following state in which I hold an active license [place]    Assessment & Plan   Encounter provider Yamel David PA-C    The patient was identified by name and date of birth. Tiffani Lancaster was informed that this is a telemedicine visit and that the visit is being conducted through the Epic Embedded platform. She agrees to proceed..  My office door was closed. No one else was in the room.  She acknowledged consent and understanding of privacy and security of the video platform. The patient has agreed to participate and understands they can discontinue the visit at any time.    Patient is aware this is a billable service.     History of Present Illness     Tiffani Lancaster is a 59 y.o. female who presents for follow up visit.     HANNA from Yuliya BUCIO     \"Tiffani Lancaster is a 58-year-old female with past medical history of migraines, GERD, biliarg cholangitis, cervical cancer status post hysterectomy, renal cell carcinoma status post cryoablation, depression, hyperlipidemia, and glaucoma.    She was seen in consult in 2024 by Yuliya Abarca.     She has had longstanding history of iron deficiency on and off oral iron supplementation.     This is never been very effective and she does not tolerate higher doses due to constipation.  She has never required IV iron previously.  She is unsure if she is ever needed blood transfusion.    She has a history of renal cell carcinoma status post cryoablation and follows with urology under surveillance with MRIs.  She also has a history of cervical cancer status post hysterectomy in .  She has never required systemic treatment.    She denies ever " "being told that she or a family member is diagnosed with thalassemia.   She denies history of gastric bypass.  She has had surgery on her colon for a bowel blockage resulting in perforation, it is uncertain if a portion of the bowel was removed.  Her last colonoscopy was 2021, good for 5 years .  She was on Protonix for many years for GERD and has stopped this a few weeks ago.     Her current symptoms include ice chewing, restless legs, thinning hair, fatigue, and easy bruising.\"     Component  Ref Range & Units 3/15/24 10:41 AM 3/12/24 10:20 AM 7/17/23  1:50 PM 1/14/22  9:34 AM   Ferritin  11 - 307 ng/mL 8 Low  6 Low  6 Low  34 R                     Component  Ref Range & Units 3/15/24 10:41 AM 3/12/24 10:20 AM 7/17/23  1:50 PM 1/14/22  9:34 AM   Iron Saturation  15 - 50 % 15 55 High  8 Low  15   TIBC  250 - 450 ug/dL 464 High  288 538 High  422   Iron  50 - 212 ug/dL 70 157 CM 42 Low  R, CM 64 R, CM   Comment: Patients treated with metal-binding drugs (ie. Deferoxamine) may have depressed iron values.   UIBC  155 - 355 ug/dL 394 High  131 Low                Review of Systems   Constitutional:  Positive for fatigue. Negative for activity change, appetite change, chills, fever and unexpected weight change.   Respiratory:  Negative for cough and shortness of breath.    Cardiovascular:  Negative for chest pain, palpitations and leg swelling.   Gastrointestinal:  Negative for abdominal pain, constipation, diarrhea, nausea and vomiting.   Genitourinary:  Negative for difficulty urinating, dysuria and hematuria.   Musculoskeletal:  Negative for arthralgias.   Skin: Negative.    Neurological:  Negative for dizziness, weakness, light-headedness, numbness and headaches.   Hematological: Negative.    Psychiatric/Behavioral: Negative.       Objective     There were no vitals taken for this visit.    Physical Exam  Constitutional:       Appearance: Normal appearance.   HENT:      Head: Normocephalic and atraumatic.   Eyes:     "  General: No scleral icterus.  Pulmonary:      Effort: Pulmonary effort is normal. No respiratory distress.   Skin:     Coloration: Skin is not pale.   Neurological:      General: No focal deficit present.      Mental Status: She is alert and oriented to person, place, and time.   Psychiatric:         Mood and Affect: Mood normal.         Behavior: Behavior normal.     1. Iron deficiency  S/p loading dose iron infusions, last one in May 30 2024  Iron levels are excellent at this time   Could be 2/2 malabsorbing iron from chronic PPI use, which she d/c already   Continue f/u with GI for scopes at appropriate intervals  Also diet recall shows lack of iron in the diet  Rec PCP to check CBC and iron studies 1-2 times a year  If recs needed for iron infusions again, PCP to message us and we can give directions on ordering    Follow up PRN.    2. Folic acid deficiency  Likely 2/2 lack in diet   Start supplement   Also to continue B12 PO daily    - folic acid (FOLVITE) 1 mg tablet; Take 1 tablet (1 mg total) by mouth daily  Dispense: 90 tablet; Refill: 3       Visit Time  Total Visit Duration: 10 min

## 2024-07-12 RX ORDER — FOLIC ACID 1 MG/1
1 TABLET ORAL DAILY
Qty: 90 TABLET | Refills: 3 | Status: SHIPPED | OUTPATIENT
Start: 2024-07-12

## 2024-07-24 DIAGNOSIS — F41.0 PANIC ATTACKS: ICD-10-CM

## 2024-07-25 RX ORDER — LORAZEPAM 0.5 MG/1
0.5 TABLET ORAL 2 TIMES DAILY
Qty: 60 TABLET | Refills: 0 | Status: SHIPPED | OUTPATIENT
Start: 2024-07-25

## 2024-08-12 ENCOUNTER — OFFICE VISIT (OUTPATIENT)
Dept: INTERNAL MEDICINE CLINIC | Facility: CLINIC | Age: 59
End: 2024-08-12
Payer: COMMERCIAL

## 2024-08-12 VITALS
WEIGHT: 144.25 LBS | TEMPERATURE: 98.4 F | HEART RATE: 77 BPM | DIASTOLIC BLOOD PRESSURE: 78 MMHG | SYSTOLIC BLOOD PRESSURE: 110 MMHG | HEIGHT: 70 IN | OXYGEN SATURATION: 98 % | BODY MASS INDEX: 20.65 KG/M2

## 2024-08-12 DIAGNOSIS — Z11.4 SCREENING FOR HIV (HUMAN IMMUNODEFICIENCY VIRUS): ICD-10-CM

## 2024-08-12 DIAGNOSIS — Z13.220 SCREENING, LIPID: ICD-10-CM

## 2024-08-12 DIAGNOSIS — Z13.1 SCREENING FOR DIABETES MELLITUS: ICD-10-CM

## 2024-08-12 DIAGNOSIS — Z02.89 ENCOUNTER FOR PHYSICAL EXAMINATION RELATED TO EMPLOYMENT: Primary | ICD-10-CM

## 2024-08-12 DIAGNOSIS — Z12.31 ENCOUNTER FOR SCREENING MAMMOGRAM FOR BREAST CANCER: ICD-10-CM

## 2024-08-12 DIAGNOSIS — E78.5 HYPERLIPIDEMIA, UNSPECIFIED HYPERLIPIDEMIA TYPE: ICD-10-CM

## 2024-08-12 DIAGNOSIS — Z13.29 SCREENING FOR THYROID DISORDER: ICD-10-CM

## 2024-08-12 DIAGNOSIS — E55.9 VITAMIN D DEFICIENCY: ICD-10-CM

## 2024-08-12 DIAGNOSIS — Z23 ENCOUNTER FOR IMMUNIZATION: ICD-10-CM

## 2024-08-12 DIAGNOSIS — F32.A DEPRESSION, UNSPECIFIED DEPRESSION TYPE: ICD-10-CM

## 2024-08-12 PROCEDURE — 99499 UNLISTED E&M SERVICE: CPT | Performed by: PHYSICIAN ASSISTANT

## 2024-08-12 PROCEDURE — 99213 OFFICE O/P EST LOW 20 MIN: CPT | Performed by: PHYSICIAN ASSISTANT

## 2024-08-12 RX ORDER — DESVENLAFAXINE 50 MG/1
50 TABLET, FILM COATED, EXTENDED RELEASE ORAL DAILY
Qty: 30 TABLET | Refills: 5 | Status: SHIPPED | OUTPATIENT
Start: 2024-08-12 | End: 2024-08-15 | Stop reason: SDUPTHER

## 2024-08-12 NOTE — PROGRESS NOTES
Adult Annual Physical  Name: Tiffani Lancaster      : 1965      MRN: [id number]  Encounter Provider: Debbie Flood PA-C  Encounter Date: 2024   Encounter department: [facility]    Assessment & Plan   1. Encounter for physical examination related to employment  2. Encounter for screening mammogram for breast cancer  -     Mammo screening bilateral w 3d & cad; Future; Expected date: 2024  3. Screening for HIV (human immunodeficiency virus)  -     HIV 1/2 AG/AB w Reflex [facility] for 2 yr old and above; Future  4. Encounter for immunization  -     Zoster Vaccine Recombinant IM  5. Vitamin D deficiency  -     Vitamin D 25 hydroxy; Future  6. Hyperlipidemia, unspecified hyperlipidemia type  -     Lipid panel; Future  7. Screening, lipid  8. Screening for thyroid disorder  -     TSH, 3rd generation; Future  9. Screening for diabetes mellitus  -     Comprehensive metabolic panel; Future  10. Depression, unspecified depression type  -     desvenlafaxine succinate (PRISTIQ) 50 mg 24 hr tablet; Take 1 tablet (50 mg total) by mouth daily    Immunizations and preventive care screenings were discussed with patient today. Appropriate education was printed on patient's after visit summary.    Counseling:  Labs ordered, mammogram ordered. [facility] form completed.   Noting increased depressive symptoms. Will discontinue lexapro in favor of Pristiq. Continue wellbutrin for now but may be able to discontinue this at future visit based on response.     Depression Screening and Follow-up Plan: Patient's depression screening was positive with a PHQ-9 score of 9. Continue regular follow-up with their mental health provider who is managing their mental health condition(s).         History of Present Illness     Adult Annual Physical:  Patient presents for annual physical.     Diet and Physical Activity:  - Diet/Nutrition: well balanced diet.  - Exercise: walking.    Depression Screening:    - PHQ-9 Score:  9    General Health:  - Sleep: sleeps well.  - Hearing: normal hearing bilateral ears.  - Vision: goes for regular eye exams.  - Dental: regular dental visits.    /GYN Health:  - Follows with GYN: yes.   - Menopause: postmenopausal.     Advanced Care Planning:  - Has an advanced directive?: no    - Has a durable medical POA?: no    - ACP document given to patient?: no      Review of Systems   Constitutional:  Negative for chills and fever.   HENT:  Negative for congestion, ear pain, hearing loss, postnasal drip, rhinorrhea, sinus pressure, sinus pain, sore throat and trouble swallowing.    Eyes:  Negative for pain and visual disturbance.   Respiratory:  Negative for cough, chest tightness, shortness of breath and wheezing.    Cardiovascular: Negative.  Negative for chest pain, palpitations and leg swelling.   Gastrointestinal:  Negative for abdominal pain, blood in stool, constipation, diarrhea, nausea and vomiting.   Endocrine: Negative for cold intolerance, heat intolerance, polydipsia, polyphagia and polyuria.   Genitourinary:  Negative for difficulty urinating, dysuria, flank pain and urgency.   Musculoskeletal:  Negative for arthralgias, back pain, gait problem and myalgias.   Skin:  Negative for rash.   Allergic/Immunologic: Negative.    Neurological:  Negative for dizziness, weakness, light-headedness and headaches.   Hematological: Negative.    Psychiatric/Behavioral:  Positive for dysphoric mood. Negative for behavioral problems and sleep disturbance. The patient is not nervous/anxious.      Current Outpatient Medications on File Prior to Visit   Medication Sig Dispense Refill   • B Complex Vitamins (B Complex-B12) TABS Take by mouth daily     • buPROPion (WELLBUTRIN XL) 300 mg 24 hr tablet TAKE 1 TABLET EVERY MORNING 90 tablet 1   • cholecalciferol (VITAMIN D3) 1,000 units tablet Take 1 tablet (1,000 Units total) by mouth daily     • denosumab (PROLIA) 60 mg/mL Inject 60 mg under the skin once     •  "Emgality 120 MG/ML SOAJ INJECT 1ML MONTHLY 3 mL 1   • folic acid (FOLVITE) 1 mg tablet Take 1 tablet (1 mg total) by mouth daily 90 tablet 3   • LORazepam (Ativan) 0.5 mg tablet Take 1 tablet (0.5 mg total) by mouth 2 (two) times a day 60 tablet 0   • lubiprostone (AMITIZA) 24 mcg capsule Take 1 capsule (24 mcg total) by mouth 2 (two) times a day with meals 180 capsule 3   • rosuvastatin (CRESTOR) 10 MG tablet TAKE 1 TABLET DAILY 90 tablet 1   • Syringe/Needle, Disp, (SYRINGE 3CC/21GX1\") 21G X 1\" 3 ML MISC Use in the morning 1 each 5   • ursodiol (ACTIGALL) 300 mg capsule Take 1 capsule (300 mg total) by mouth 2 (two) times a day 180 capsule 3   • [DISCONTINUED] escitalopram (LEXAPRO) 20 mg tablet TAKE 1 TABLET DAILY 90 tablet 3   • denosumab (PROLIA) 60 mg/mL Inject 1 mL (60 mg total) under the skin once for 1 dose 1 mL 0   • ketorolac (TORADOL) 10 mg tablet take 1 tablet by mouth every 6 hours if needed for MODERATE PAIN ...  (REFER TO PRESCRIPTION NOTES).       Current Facility-Administered Medications on File Prior to Visit   Medication Dose Route Frequency Provider Last Rate Last Admin   • ondansetron (ZOFRAN-ODT) dispersible tablet 4 mg  4 mg Oral Q6H PRN RUPINDER Woodard   4 mg at 11/17/18 1717      Social History     Tobacco Use   • Smoking status: Never     Passive exposure: Never   • Smokeless tobacco: Never   Vaping Use   • Vaping status: Never Used   Substance and Sexual Activity   • Alcohol use: Yes     Comment: socially, RARELY   • Drug use: No   • Sexual activity: Yes     Partners: Male     Birth control/protection: None       Objective     /78 (BP Location: Right arm, Patient Position: Sitting)   Pulse 77   Temp 98.4 °F (36.9 °C) (Tympanic)   Ht 5' 9.69\" (1.77 m)   Wt 65.4 kg (144 lb 4 oz)   SpO2 98%   BMI 20.89 kg/m²     Physical Exam  Constitutional:       Appearance: She is well-developed.   HENT:      Head: Normocephalic and atraumatic.      Right Ear: External ear normal.      " Left Ear: External ear normal.      Nose: Nose normal.   Eyes:      Conjunctiva/sclera: Conjunctivae normal.   Cardiovascular:      Rate and Rhythm: Normal rate and regular rhythm.      Heart sounds: Normal heart sounds.   Pulmonary:      Effort: Pulmonary effort is normal.      Breath sounds: Normal breath sounds.   Abdominal:      General: Bowel sounds are normal.      Palpations: Abdomen is soft.   Musculoskeletal:         General: Normal range of motion.      Cervical back: Normal range of motion and neck supple.   Skin:     General: Skin is warm and dry.   Neurological:      Mental Status: She is alert and oriented to person, place, and time.   Psychiatric:         Mood and Affect: Mood is depressed.         Behavior: Behavior normal.         Thought Content: Thought content normal.         Judgment: Judgment normal.       Administrative Statements   I have spent a total time of 20 minutes in caring for this patient on the day of the visit/encounter including Risks and benefits of tx options, Instructions for management, Patient and family education, Importance of tx compliance, Risk factor reductions, Impressions, Counseling / Coordination of care, Documenting in the medical record, and Reviewing / ordering tests, medicine, procedures  .

## 2024-08-14 ENCOUNTER — TELEPHONE (OUTPATIENT)
Age: 59
End: 2024-08-14

## 2024-08-14 NOTE — TELEPHONE ENCOUNTER
Patient called and said Debbie called in the medication desvenlafaxine for her.  She's asking if Debbie can call in the brand (Pristiq) instead since it is cheaper for her to get the brand than it is for the generic.  Pharmacy is [facility] and she would like a call back letting her know if Debbie will do this.

## 2024-08-15 ENCOUNTER — TELEPHONE (OUTPATIENT)
Age: 59
End: 2024-08-15

## 2024-08-15 DIAGNOSIS — F32.A DEPRESSION, UNSPECIFIED DEPRESSION TYPE: ICD-10-CM

## 2024-08-15 RX ORDER — DESVENLAFAXINE 50 MG/1
50 TABLET, FILM COATED, EXTENDED RELEASE ORAL DAILY
Qty: 30 TABLET | Refills: 5 | Status: SHIPPED | OUTPATIENT
Start: 2024-08-15 | End: 2024-08-20 | Stop reason: SDUPTHER

## 2024-08-15 NOTE — TELEPHONE ENCOUNTER
Pt called in and said she still hasn't been able to get her Pristiq and has gone 2 days without taking her other medication. A PA is required for the brand name. Pt would also know if she should restart her prior med while she waits for the PA to be completed. Please advise.

## 2024-08-19 NOTE — TELEPHONE ENCOUNTER
PA for desvenlafaxine succinate (PRISTIQ) 50 mg 24 hr tablet SUBMITTED     via    [x]CMM-KEY: [id number]  []Surescripts-Case ID #   []Faxed to plan   []Other website   []Phone call Case ID #     Office notes sent, clinical questions answered. Awaiting determination    Turnaround time for your insurance to make a decision on your Prior Authorization can take 7-21 business days.             negative...

## 2024-08-20 ENCOUNTER — TELEPHONE (OUTPATIENT)
Age: 59
End: 2024-08-20

## 2024-08-20 DIAGNOSIS — F32.A DEPRESSION, UNSPECIFIED DEPRESSION TYPE: ICD-10-CM

## 2024-08-20 RX ORDER — DESVENLAFAXINE 50 MG/1
50 TABLET, FILM COATED, EXTENDED RELEASE ORAL DAILY
Qty: 30 TABLET | Refills: 5 | Status: SHIPPED | OUTPATIENT
Start: 2024-08-20 | End: 2024-08-21 | Stop reason: SDUPTHER

## 2024-08-20 RX ORDER — DESVENLAFAXINE 50 MG/1
50 TABLET, FILM COATED, EXTENDED RELEASE ORAL DAILY
Qty: 30 TABLET | Refills: 5 | Status: SHIPPED | OUTPATIENT
Start: 2024-08-20 | End: 2024-08-20

## 2024-08-20 NOTE — TELEPHONE ENCOUNTER
Patient spoke with both ins & pharmacy she just needs the script for desvenlafaxine succinate (PRISTIQ) 50 mg 24 hr tablet sent to the pharmacy it doesn't need a pre-auth    Please call patient back with any questions at [phone]

## 2024-08-20 NOTE — TELEPHONE ENCOUNTER
PA for  (PRISTIQ) 50mg not required     Reason (screenshot if applicable)          Patient advised by        Office staff  [] MyChart Message  [] Phone call   []LMOM  []L/M to call office as no active Communication consent on file  []Unable to leave detailed message as VM not approved on Communication consent       Pharmacy advised by    [x]Fax  []Phone call

## 2024-08-20 NOTE — TELEPHONE ENCOUNTER
Patient needs brand name due to having a prescription card. She would also like to know if she should be taking her lexapro until she gets the pristiq.

## 2024-08-21 ENCOUNTER — TELEPHONE (OUTPATIENT)
Dept: INTERNAL MEDICINE CLINIC | Facility: CLINIC | Age: 59
End: 2024-08-21

## 2024-08-21 DIAGNOSIS — F32.A DEPRESSION, UNSPECIFIED DEPRESSION TYPE: ICD-10-CM

## 2024-08-21 RX ORDER — DESVENLAFAXINE 50 MG/1
50 TABLET, FILM COATED, EXTENDED RELEASE ORAL DAILY
Qty: 30 TABLET | Refills: 5 | Status: SHIPPED | OUTPATIENT
Start: 2024-08-21 | End: 2025-02-17

## 2024-08-21 NOTE — TELEPHONE ENCOUNTER
Patient called about medication Pristiq. I see it was sent to [facility] but it says it didn't reach its destination.

## 2024-08-21 NOTE — TELEPHONE ENCOUNTER
Spoke with patient and explained that she need a prior auth for this medication. She states she is going to pay for it out of pocket using a discount card. I told her I would figure out how I can get the medication released for her to do so and will give her a call back

## 2024-08-21 NOTE — TELEPHONE ENCOUNTER
"Patient called for update. Medication states \"not released to destination\". Patient is requesting to know what is going on. Patient requests call back to update today.   "

## 2024-08-22 ENCOUNTER — TELEPHONE (OUTPATIENT)
Age: 59
End: 2024-08-22

## 2024-08-22 NOTE — TELEPHONE ENCOUNTER
PT called in stating that her insurance was billed for her recent physical on 08/12.    PT reports that Physical Appt should have been billed to her IU, as they pay for all physicals.     PT is requesting for this to be corrected, and be billed to IU.     Please advise & call pt with update. Thank you!    Tiffani Lancaster   [phone]

## 2024-09-04 ENCOUNTER — LAB (OUTPATIENT)
Dept: LAB | Facility: CLINIC | Age: 59
End: 2024-09-04
Payer: COMMERCIAL

## 2024-09-04 DIAGNOSIS — Z13.1 SCREENING FOR DIABETES MELLITUS: ICD-10-CM

## 2024-09-04 DIAGNOSIS — Z11.4 SCREENING FOR HIV (HUMAN IMMUNODEFICIENCY VIRUS): ICD-10-CM

## 2024-09-04 DIAGNOSIS — E55.9 VITAMIN D DEFICIENCY: ICD-10-CM

## 2024-09-04 DIAGNOSIS — E78.5 HYPERLIPIDEMIA, UNSPECIFIED HYPERLIPIDEMIA TYPE: ICD-10-CM

## 2024-09-04 DIAGNOSIS — Z13.29 SCREENING FOR THYROID DISORDER: ICD-10-CM

## 2024-09-04 LAB
25(OH)D3 SERPL-MCNC: 78.3 NG/ML (ref 30–100)
ALBUMIN SERPL BCG-MCNC: 4.7 G/DL (ref 3.5–5)
ALP SERPL-CCNC: 55 U/L (ref 34–104)
ALT SERPL W P-5'-P-CCNC: 14 U/L (ref 7–52)
ANION GAP SERPL CALCULATED.3IONS-SCNC: 9 MMOL/L (ref 4–13)
AST SERPL W P-5'-P-CCNC: 17 U/L (ref 13–39)
BILIRUB SERPL-MCNC: 0.58 MG/DL (ref 0.2–1)
BUN SERPL-MCNC: 13 MG/DL (ref 5–25)
CALCIUM SERPL-MCNC: 9.9 MG/DL (ref 8.4–10.2)
CHLORIDE SERPL-SCNC: 101 MMOL/L (ref 96–108)
CHOLEST SERPL-MCNC: 196 MG/DL
CO2 SERPL-SCNC: 30 MMOL/L (ref 21–32)
CREAT SERPL-MCNC: 0.86 MG/DL (ref 0.6–1.3)
GFR SERPL CREATININE-BSD FRML MDRD: 74 ML/MIN/1.73SQ M
GLUCOSE P FAST SERPL-MCNC: 85 MG/DL (ref 65–99)
HDLC SERPL-MCNC: 77 MG/DL
LDLC SERPL CALC-MCNC: 107 MG/DL (ref 0–100)
NONHDLC SERPL-MCNC: 119 MG/DL
POTASSIUM SERPL-SCNC: 4 MMOL/L (ref 3.5–5.3)
PROT SERPL-MCNC: 6.9 G/DL (ref 6.4–8.4)
SODIUM SERPL-SCNC: 140 MMOL/L (ref 135–147)
TRIGL SERPL-MCNC: 60 MG/DL
TSH SERPL DL<=0.05 MIU/L-ACNC: 1.63 UIU/ML (ref 0.45–4.5)

## 2024-09-04 PROCEDURE — 87389 HIV-1 AG W/HIV-1&-2 AB AG IA: CPT

## 2024-09-04 PROCEDURE — 80053 COMPREHEN METABOLIC PANEL: CPT

## 2024-09-04 PROCEDURE — 80061 LIPID PANEL: CPT

## 2024-09-04 PROCEDURE — 84443 ASSAY THYROID STIM HORMONE: CPT

## 2024-09-04 PROCEDURE — 82306 VITAMIN D 25 HYDROXY: CPT

## 2024-09-04 PROCEDURE — 36415 COLL VENOUS BLD VENIPUNCTURE: CPT

## 2024-09-05 LAB
HIV 1+2 AB+HIV1 P24 AG SERPL QL IA: NORMAL
HIV 2 AB SERPL QL IA: NORMAL
HIV1 AB SERPL QL IA: NORMAL
HIV1 P24 AG SERPL QL IA: NORMAL

## 2024-09-08 NOTE — RESULT ENCOUNTER NOTE
Magan Nixon  I received and reviewed your recent labs and everything looked good. Please reach out with any specific questions or concerns. Have a great day  -Debbie

## 2024-09-11 DIAGNOSIS — E53.8 FOLIC ACID DEFICIENCY: ICD-10-CM

## 2024-09-11 DIAGNOSIS — G43.719 CHRONIC MIGRAINE WITHOUT AURA, INTRACTABLE, WITHOUT STATUS MIGRAINOSUS: ICD-10-CM

## 2024-09-11 DIAGNOSIS — F32.A DEPRESSION, UNSPECIFIED DEPRESSION TYPE: ICD-10-CM

## 2024-09-11 RX ORDER — GALCANEZUMAB 120 MG/ML
1 INJECTION, SOLUTION SUBCUTANEOUS
Qty: 3 ML | Refills: 0 | Status: SHIPPED | OUTPATIENT
Start: 2024-09-11

## 2024-09-12 RX ORDER — DESVENLAFAXINE 50 MG/1
50 TABLET, FILM COATED, EXTENDED RELEASE ORAL DAILY
Qty: 30 TABLET | Refills: 0 | Status: SHIPPED | OUTPATIENT
Start: 2024-09-12 | End: 2025-03-11

## 2024-09-13 RX ORDER — FOLIC ACID 1 MG/1
1 TABLET ORAL DAILY
Qty: 90 TABLET | Refills: 3 | Status: SHIPPED | OUTPATIENT
Start: 2024-09-13

## 2024-09-26 DIAGNOSIS — K59.09 CHRONIC CONSTIPATION: ICD-10-CM

## 2024-09-26 RX ORDER — LUBIPROSTONE 24 UG/1
CAPSULE ORAL
Qty: 180 CAPSULE | Refills: 1 | Status: SHIPPED | OUTPATIENT
Start: 2024-09-26

## 2024-10-04 DIAGNOSIS — G43.719 CHRONIC MIGRAINE WITHOUT AURA, INTRACTABLE, WITHOUT STATUS MIGRAINOSUS: ICD-10-CM

## 2024-10-07 RX ORDER — GALCANEZUMAB 120 MG/ML
1 INJECTION, SOLUTION SUBCUTANEOUS
Qty: 3 ML | Refills: 0 | Status: SHIPPED | OUTPATIENT
Start: 2024-10-07

## 2024-10-09 DIAGNOSIS — E53.8 FOLIC ACID DEFICIENCY: ICD-10-CM

## 2024-10-09 DIAGNOSIS — F32.A DEPRESSION, UNSPECIFIED DEPRESSION TYPE: ICD-10-CM

## 2024-10-09 RX ORDER — FOLIC ACID 1 MG/1
1 TABLET ORAL DAILY
Qty: 90 TABLET | Refills: 0 | Status: SHIPPED | OUTPATIENT
Start: 2024-10-09

## 2024-10-10 RX ORDER — DESVENLAFAXINE 50 MG/1
50 TABLET, FILM COATED, EXTENDED RELEASE ORAL DAILY
Qty: 30 TABLET | Refills: 0 | Status: SHIPPED | OUTPATIENT
Start: 2024-10-10 | End: 2025-04-08

## 2024-10-24 DIAGNOSIS — E53.8 FOLIC ACID DEFICIENCY: ICD-10-CM

## 2024-10-24 RX ORDER — FOLIC ACID 1 MG/1
1 TABLET ORAL DAILY
Qty: 90 TABLET | Refills: 1 | Status: SHIPPED | OUTPATIENT
Start: 2024-10-24

## 2024-10-29 DIAGNOSIS — M81.0 AGE-RELATED OSTEOPOROSIS WITHOUT CURRENT PATHOLOGICAL FRACTURE: ICD-10-CM

## 2024-10-30 NOTE — TELEPHONE ENCOUNTER
Patient called back, she is requesting the      denosumab (PROLIA) 60 mg/mL    To be sent to the     [facility]   [address]  [place], [place]      Please advise, thank you

## 2024-11-04 DIAGNOSIS — G43.719 CHRONIC MIGRAINE WITHOUT AURA, INTRACTABLE, WITHOUT STATUS MIGRAINOSUS: ICD-10-CM

## 2024-11-05 ENCOUNTER — RA CDI HCC (OUTPATIENT)
Dept: OTHER | Facility: HOSPITAL | Age: 59
End: 2024-11-05

## 2024-11-05 DIAGNOSIS — F32.A DEPRESSION, UNSPECIFIED DEPRESSION TYPE: ICD-10-CM

## 2024-11-05 DIAGNOSIS — G43.719 CHRONIC MIGRAINE WITHOUT AURA, INTRACTABLE, WITHOUT STATUS MIGRAINOSUS: ICD-10-CM

## 2024-11-05 RX ORDER — GALCANEZUMAB 120 MG/ML
1 INJECTION, SOLUTION SUBCUTANEOUS
Qty: 3 ML | Refills: 2 | Status: SHIPPED | OUTPATIENT
Start: 2024-11-05 | End: 2024-11-05 | Stop reason: SDUPTHER

## 2024-11-06 RX ORDER — DESVENLAFAXINE SUCCINATE 50 MG
50 TABLET, EXTENDED RELEASE 24 HR ORAL DAILY
Qty: 30 TABLET | Refills: 5 | Status: SHIPPED | OUTPATIENT
Start: 2024-11-06

## 2024-11-06 RX ORDER — GALCANEZUMAB 120 MG/ML
1 INJECTION, SOLUTION SUBCUTANEOUS
Qty: 3 ML | Refills: 0 | Status: SHIPPED | OUTPATIENT
Start: 2024-11-06 | End: 2024-11-12

## 2024-11-12 ENCOUNTER — TELEPHONE (OUTPATIENT)
Dept: INTERNAL MEDICINE CLINIC | Facility: CLINIC | Age: 59
End: 2024-11-12

## 2024-11-12 ENCOUNTER — TELEPHONE (OUTPATIENT)
Age: 59
End: 2024-11-12

## 2024-11-12 ENCOUNTER — OFFICE VISIT (OUTPATIENT)
Dept: INTERNAL MEDICINE CLINIC | Facility: CLINIC | Age: 59
End: 2024-11-12
Payer: COMMERCIAL

## 2024-11-12 VITALS
SYSTOLIC BLOOD PRESSURE: 102 MMHG | OXYGEN SATURATION: 98 % | WEIGHT: 144.25 LBS | HEIGHT: 69 IN | BODY MASS INDEX: 21.36 KG/M2 | DIASTOLIC BLOOD PRESSURE: 64 MMHG | TEMPERATURE: 97.7 F | HEART RATE: 66 BPM

## 2024-11-12 DIAGNOSIS — M81.0 AGE-RELATED OSTEOPOROSIS WITHOUT CURRENT PATHOLOGICAL FRACTURE: ICD-10-CM

## 2024-11-12 DIAGNOSIS — G43.909 MIGRAINE WITHOUT STATUS MIGRAINOSUS, NOT INTRACTABLE, UNSPECIFIED MIGRAINE TYPE: ICD-10-CM

## 2024-11-12 DIAGNOSIS — G43.719 CHRONIC MIGRAINE WITHOUT AURA, INTRACTABLE, WITHOUT STATUS MIGRAINOSUS: ICD-10-CM

## 2024-11-12 DIAGNOSIS — K74.3 PRIMARY BILIARY CHOLANGITIS (HCC): ICD-10-CM

## 2024-11-12 DIAGNOSIS — C64.1 RENAL CELL CARCINOMA OF RIGHT KIDNEY (HCC): ICD-10-CM

## 2024-11-12 DIAGNOSIS — Z23 ENCOUNTER FOR IMMUNIZATION: ICD-10-CM

## 2024-11-12 DIAGNOSIS — F32.A DEPRESSION, UNSPECIFIED DEPRESSION TYPE: Primary | ICD-10-CM

## 2024-11-12 PROCEDURE — 90471 IMMUNIZATION ADMIN: CPT

## 2024-11-12 PROCEDURE — 99213 OFFICE O/P EST LOW 20 MIN: CPT | Performed by: PHYSICIAN ASSISTANT

## 2024-11-12 PROCEDURE — 90673 RIV3 VACCINE NO PRESERV IM: CPT

## 2024-11-12 RX ORDER — BUPROPION HYDROCHLORIDE 300 MG/1
300 TABLET ORAL EVERY MORNING
Qty: 90 TABLET | Refills: 1 | Status: SHIPPED | OUTPATIENT
Start: 2024-11-12 | End: 2024-11-13 | Stop reason: SDUPTHER

## 2024-11-12 RX ORDER — KETOROLAC TROMETHAMINE 15 MG/ML
15 INJECTION, SOLUTION INTRAMUSCULAR; INTRAVENOUS ONCE AS NEEDED
Qty: 1 ML | Refills: 2 | Status: SHIPPED | OUTPATIENT
Start: 2024-11-12

## 2024-11-12 RX ORDER — SYRINGE W-NEEDLE,DISPOSAB,3 ML 25GX5/8"
SYRINGE, EMPTY DISPOSABLE MISCELLANEOUS DAILY
Qty: 1 EACH | Refills: 5 | Status: SHIPPED | OUTPATIENT
Start: 2024-11-12

## 2024-11-12 RX ORDER — GALCANEZUMAB 120 MG/ML
1 INJECTION, SOLUTION SUBCUTANEOUS
Qty: 3 ML | Refills: 3 | Status: SHIPPED | OUTPATIENT
Start: 2024-11-12

## 2024-11-12 NOTE — PROGRESS NOTES
"Ambulatory Visit  Name: Tiffani Lancaster      : 1965      MRN: [id number]  Encounter Provider: Debbie Flood PA-C  Encounter Date: 2024   Encounter department: [facility]    Assessment & Plan  Encounter for immunization    Orders:    Zoster Vaccine Recombinant IM    influenza vaccine, recombinant, PF, 0.5 mL IM (Flublok)    Renal cell carcinoma of right kidney (HCC)         Primary biliary cholangitis (HCC)         Chronic migraine without aura, intractable, without status migrainosus    Orders:    Syringe/Needle, Disp, (SYRINGE 3CC/21GX1\") 21G X 1\" 3 ML MISC; Use in the morning    Emgality 120 MG/ML SOAJ; 1 mL by Intramuscular (multi inj) route every 30 (thirty) days    Migraine without status migrainosus, not intractable, unspecified migraine type    Orders:    ketorolac (TORADOL) 15 mg/mL injection; Inject 1 mL (15 mg total) into a muscle once as needed for moderate pain for up to 1 dose    Depression, unspecified depression type  Depression Screening Follow-up Plan: Patient's depression screening was positive with a PHQ-9 score of 8. Continue regular follow-up with their psychologist/therapist/psychiatrist who is managing their mental health condition(s).    Patient expressed that she feels better with the Pristiq. However, feels like there is room for improvement. Added Wellbutrin back into treatment plan to help with mood and weight concerns.      Orders:    buPROPion (WELLBUTRIN XL) 300 mg 24 hr tablet; Take 1 tablet (300 mg total) by mouth every morning    Age-related osteoporosis without current pathological fracture    Orders:    denosumab (PROLIA) 60 mg/mL; Inject 1 mL (60 mg total) under the skin once for 1 dose      Depression Screening and Follow-up Plan: Patient's depression screening was positive with a PHQ-9 score of 8. Patient assessed for underlying major depression. Brief counseling provided and recommend additional follow-up/re-evaluation next office visit. "       History of Present Illness     Patient presents to office for routine follow up on depression. Patient stopped taking Wellbutrin and has concerns about weight since stopping the Wellbutrin. Patient expressed that her depression is ok but endorses it could be better. Explained the option of restarting Wellbutrin to help with mood and the concern for weight gain. Patient expressed no other concerns at this time.         History obtained from : patient  Review of Systems   Constitutional:  Negative for chills and fever.   HENT:  Negative for congestion, ear pain, hearing loss, postnasal drip, rhinorrhea, sinus pressure, sinus pain, sore throat and trouble swallowing.    Eyes:  Negative for pain and visual disturbance.   Respiratory:  Negative for cough, chest tightness, shortness of breath and wheezing.    Cardiovascular: Negative.  Negative for chest pain, palpitations and leg swelling.   Gastrointestinal:  Negative for abdominal pain, blood in stool, constipation, diarrhea, nausea and vomiting.   Endocrine: Negative for cold intolerance, heat intolerance, polydipsia, polyphagia and polyuria.   Genitourinary:  Negative for difficulty urinating, dysuria, flank pain, hematuria and urgency.   Musculoskeletal:  Negative for arthralgias, back pain, gait problem and myalgias.   Skin:  Negative for color change and rash.   Allergic/Immunologic: Negative.    Neurological:  Negative for dizziness, seizures, syncope, weakness, light-headedness and headaches.   Hematological: Negative.    Psychiatric/Behavioral:  Positive for dysphoric mood. Negative for behavioral problems and sleep disturbance. The patient is not nervous/anxious.    All other systems reviewed and are negative.    Current Outpatient Medications on File Prior to Visit   Medication Sig Dispense Refill    B Complex Vitamins (B Complex-B12) TABS Take by mouth daily      cholecalciferol (VITAMIN D3) 1,000 units tablet Take 1 tablet (1,000 Units total) by mouth  "daily      folic acid (FOLVITE) 1 mg tablet Take 1 tablet (1 mg total) by mouth daily 90 tablet 1    LORazepam (Ativan) 0.5 mg tablet Take 1 tablet (0.5 mg total) by mouth 2 (two) times a day 60 tablet 0    lubiprostone (AMITIZA) 24 mcg capsule TAKE 1 CAPSULE TWICE A DAY WITH MEALS 180 capsule 1    Pristiq 50 MG 24 hr tablet TAKE 1 TABLET (50 MG TOTAL) BY MOUTH DAILY 30 tablet 5    rosuvastatin (CRESTOR) 10 MG tablet TAKE 1 TABLET DAILY 90 tablet 1    ursodiol (ACTIGALL) 300 mg capsule Take 1 capsule (300 mg total) by mouth 2 (two) times a day 180 capsule 3    [DISCONTINUED] denosumab (PROLIA) 60 mg/mL Inject 1 mL (60 mg total) under the skin once for 1 dose 1 mL 0    [DISCONTINUED] Emgality 120 MG/ML SOAJ 1 mL by Intramuscular (multi inj) route every 30 (thirty) days 3 mL 0    [DISCONTINUED] Syringe/Needle, Disp, (SYRINGE 3CC/21GX1\") 21G X 1\" 3 ML MISC Use in the morning 1 each 5    [DISCONTINUED] buPROPion (WELLBUTRIN XL) 300 mg 24 hr tablet TAKE 1 TABLET EVERY MORNING (Patient not taking: Reported on 11/12/2024) 90 tablet 1    [DISCONTINUED] denosumab (PROLIA) 60 mg/mL Inject 60 mg under the skin once (Patient not taking: Reported on 11/12/2024)      [DISCONTINUED] ketorolac (TORADOL) 10 mg tablet take 1 tablet by mouth every 6 hours if needed for MODERATE PAIN ...  (REFER TO PRESCRIPTION NOTES).       Current Facility-Administered Medications on File Prior to Visit   Medication Dose Route Frequency Provider Last Rate Last Admin    ondansetron (ZOFRAN-ODT) dispersible tablet 4 mg  4 mg Oral Q6H PRN RUPINDER Woodard   4 mg at 11/17/18 5017      Social History     Tobacco Use    Smoking status: Never     Passive exposure: Never    Smokeless tobacco: Never   Vaping Use    Vaping status: Never Used   Substance and Sexual Activity    Alcohol use: Yes     Comment: socially, RARELY    Drug use: No    Sexual activity: Yes     Partners: Male     Birth control/protection: None         Objective     /64   Pulse " "66   Temp 97.7 °F (36.5 °C)   Ht 5' 9\" (1.753 m)   Wt 65.4 kg (144 lb 4 oz)   SpO2 98%   BMI 21.30 kg/m²     Physical Exam  Vitals and nursing note reviewed.   Constitutional:       General: She is not in acute distress.     Appearance: She is well-developed.   HENT:      Head: Normocephalic and atraumatic.   Eyes:      Conjunctiva/sclera: Conjunctivae normal.   Cardiovascular:      Rate and Rhythm: Normal rate and regular rhythm.      Heart sounds: No murmur heard.  Pulmonary:      Effort: Pulmonary effort is normal. No respiratory distress.      Breath sounds: Normal breath sounds.   Abdominal:      Palpations: Abdomen is soft.      Tenderness: There is no abdominal tenderness.   Musculoskeletal:         General: No swelling.      Cervical back: Neck supple.   Skin:     General: Skin is warm and dry.      Capillary Refill: Capillary refill takes less than 2 seconds.   Neurological:      General: No focal deficit present.      Mental Status: She is alert and oriented to person, place, and time.   Psychiatric:         Mood and Affect: Mood is depressed. Affect is flat.         Behavior: Behavior normal.         Thought Content: Thought content normal.         Judgment: Judgment normal.       Administrative Statements   I have spent a total time of 15 minutes in caring for this patient on the day of the visit/encounter including Risks and benefits of tx options, Instructions for management, Patient and family education, Importance of tx compliance, Risk factor reductions, Impressions, Counseling / Coordination of care, Documenting in the medical record, Reviewing / ordering tests, medicine, procedures  , and Obtaining or reviewing history  .  "

## 2024-11-12 NOTE — ASSESSMENT & PLAN NOTE
"  Orders:    Syringe/Needle, Disp, (SYRINGE 3CC/21GX1\") 21G X 1\" 3 ML MISC; Use in the morning    Emgality 120 MG/ML SOAJ; 1 mL by Intramuscular (multi inj) route every 30 (thirty) days    "

## 2024-11-12 NOTE — TELEPHONE ENCOUNTER
Patient called to let Debbie know that she would need the name brand Wellbutrin XL and name brand Prolia sent to the pharmacy to be able too use her discount card.  I noticed that the Prolia is crossed out of her list of medications but she said she should be getting it.    Please call the patient back, thank you.   Quality 226: Preventive Care And Screening: Tobacco Use: Screening And Cessation Intervention: Patient screened for tobacco use and is an ex/non-smoker Detail Level: Detailed Quality 431: Preventive Care And Screening: Unhealthy Alcohol Use - Screening: Patient screened for unhealthy alcohol use using a single question and scores less than 2 times per year Quality 130: Documentation Of Current Medications In The Medical Record: Current Medications Documented Quality 111:Pneumonia Vaccination Status For Older Adults: Pneumococcal Vaccination not Administered or Previously Received, Reason not Otherwise Specified Quality 110: Preventive Care And Screening: Influenza Immunization: Influenza Immunization Ordered or Recommended, but not Administered due to system reason

## 2024-11-12 NOTE — ASSESSMENT & PLAN NOTE
Orders:    ketorolac (TORADOL) 15 mg/mL injection; Inject 1 mL (15 mg total) into a muscle once as needed for moderate pain for up to 1 dose

## 2024-11-12 NOTE — ASSESSMENT & PLAN NOTE
Depression Screening Follow-up Plan: Patient's depression screening was positive with a PHQ-9 score of 8. Continue regular follow-up with their psychologist/therapist/psychiatrist who is managing their mental health condition(s).    Patient expressed that she feels better with the Pristiq. However, feels like there is room for improvement. Added Wellbutrin back into treatment plan to help with mood and weight concerns.      Orders:    buPROPion (WELLBUTRIN XL) 300 mg 24 hr tablet; Take 1 tablet (300 mg total) by mouth every morning

## 2024-11-13 ENCOUNTER — TELEPHONE (OUTPATIENT)
Age: 59
End: 2024-11-13

## 2024-11-13 DIAGNOSIS — E61.1 IRON DEFICIENCY: Primary | ICD-10-CM

## 2024-11-13 DIAGNOSIS — F32.A DEPRESSION, UNSPECIFIED DEPRESSION TYPE: ICD-10-CM

## 2024-11-13 DIAGNOSIS — M81.0 AGE-RELATED OSTEOPOROSIS WITHOUT CURRENT PATHOLOGICAL FRACTURE: ICD-10-CM

## 2024-11-13 RX ORDER — BUPROPION HYDROCHLORIDE 300 MG/1
300 TABLET ORAL EVERY MORNING
Qty: 90 TABLET | Refills: 1 | Status: SHIPPED | OUTPATIENT
Start: 2024-11-13 | End: 2024-11-15 | Stop reason: SDUPTHER

## 2024-11-13 NOTE — TELEPHONE ENCOUNTER
PA for Emgality 120 MG/ML SOAJ  SUBMITTED to [employer]    via    [x]CMM-KEY: [id number]  []Surescripts-Case ID #   []Availity-Auth ID # NDC #   []Faxed to plan   []Other website   []Phone call Case ID #     [x]PA sent as URGENT    All office notes, labs and other pertaining documents and studies sent. Clinical questions answered. Awaiting determination from insurance company.     Turnaround time for your insurance to make a decision on your Prior Authorization can take 7-21 business days.

## 2024-11-14 NOTE — TELEPHONE ENCOUNTER
PA for  Emgality 120 MG/ML SOAJ  APPROVED     Date(s) approved  09/13/2024 - 11/12/2025    Case # [id number]    Patient advised by          [x]MyChart Message  []Phone call   []LMOM  []L/M to call office as no active Communication consent on file  []Unable to leave detailed message as VM not approved on Communication consent       Pharmacy advised by    [x]Fax  []Phone call    Approval letter scanned into [employer] Yes

## 2024-11-15 ENCOUNTER — APPOINTMENT (OUTPATIENT)
Dept: LAB | Facility: CLINIC | Age: 59
End: 2024-11-15
Payer: COMMERCIAL

## 2024-11-15 ENCOUNTER — TELEPHONE (OUTPATIENT)
Age: 59
End: 2024-11-15

## 2024-11-15 DIAGNOSIS — E61.1 IRON DEFICIENCY: ICD-10-CM

## 2024-11-15 DIAGNOSIS — F32.A DEPRESSION, UNSPECIFIED DEPRESSION TYPE: ICD-10-CM

## 2024-11-15 LAB
FERRITIN SERPL-MCNC: 171 NG/ML (ref 11–307)
IRON SATN MFR SERPL: 30 % (ref 15–50)
IRON SERPL-MCNC: 109 UG/DL (ref 50–212)
TIBC SERPL-MCNC: 368 UG/DL (ref 250–450)
UIBC SERPL-MCNC: 259 UG/DL (ref 155–355)

## 2024-11-15 PROCEDURE — 83540 ASSAY OF IRON: CPT

## 2024-11-15 PROCEDURE — 83550 IRON BINDING TEST: CPT

## 2024-11-15 PROCEDURE — 82728 ASSAY OF FERRITIN: CPT

## 2024-11-15 PROCEDURE — 36415 COLL VENOUS BLD VENIPUNCTURE: CPT

## 2024-11-15 RX ORDER — BUPROPION HYDROCHLORIDE 300 MG/1
300 TABLET ORAL EVERY MORNING
Qty: 90 TABLET | Refills: 1 | Status: SHIPPED | OUTPATIENT
Start: 2024-11-15

## 2024-11-15 NOTE — TELEPHONE ENCOUNTER
Rx went to [facility]     She needs it to go to [facility] and it needs to be done today so she has meds.    [facility] and [facility].

## 2024-11-18 DIAGNOSIS — E78.5 HYPERLIPIDEMIA, UNSPECIFIED HYPERLIPIDEMIA TYPE: ICD-10-CM

## 2024-11-19 ENCOUNTER — RESULTS FOLLOW-UP (OUTPATIENT)
Dept: INTERNAL MEDICINE CLINIC | Facility: CLINIC | Age: 59
End: 2024-11-19

## 2024-11-19 RX ORDER — ROSUVASTATIN CALCIUM 10 MG/1
10 TABLET, COATED ORAL DAILY
Qty: 90 TABLET | Refills: 1 | Status: SHIPPED | OUTPATIENT
Start: 2024-11-19

## 2024-12-03 ENCOUNTER — CLINICAL SUPPORT (OUTPATIENT)
Dept: INTERNAL MEDICINE CLINIC | Facility: CLINIC | Age: 59
End: 2024-12-03
Payer: COMMERCIAL

## 2024-12-03 DIAGNOSIS — M81.0 AGE-RELATED OSTEOPOROSIS WITHOUT CURRENT PATHOLOGICAL FRACTURE: Primary | ICD-10-CM

## 2024-12-03 PROCEDURE — 96372 THER/PROPH/DIAG INJ SC/IM: CPT

## 2024-12-05 DIAGNOSIS — G43.719 CHRONIC MIGRAINE WITHOUT AURA, INTRACTABLE, WITHOUT STATUS MIGRAINOSUS: ICD-10-CM

## 2024-12-06 RX ORDER — GALCANEZUMAB 120 MG/ML
1 INJECTION, SOLUTION SUBCUTANEOUS
Qty: 3 ML | Refills: 0 | Status: SHIPPED | OUTPATIENT
Start: 2024-12-06 | End: 2024-12-11 | Stop reason: SDUPTHER

## 2024-12-11 DIAGNOSIS — G43.719 CHRONIC MIGRAINE WITHOUT AURA, INTRACTABLE, WITHOUT STATUS MIGRAINOSUS: ICD-10-CM

## 2024-12-12 RX ORDER — GALCANEZUMAB 120 MG/ML
1 INJECTION, SOLUTION SUBCUTANEOUS
Qty: 3 ML | Refills: 3 | Status: SHIPPED | OUTPATIENT
Start: 2024-12-12

## 2025-01-02 DIAGNOSIS — G43.719 CHRONIC MIGRAINE WITHOUT AURA, INTRACTABLE, WITHOUT STATUS MIGRAINOSUS: ICD-10-CM

## 2025-01-03 RX ORDER — GALCANEZUMAB 120 MG/ML
1 INJECTION, SOLUTION SUBCUTANEOUS
Qty: 3 ML | Refills: 1 | Status: SHIPPED | OUTPATIENT
Start: 2025-01-03

## 2025-01-10 NOTE — TELEPHONE ENCOUNTER
Pt called, pharmacy told her that pcp discontinued the meds. No notes of that anywhere. Advised with Isabel, she is going to reach out to pharmacy and will give pt a call back.

## 2025-01-25 DIAGNOSIS — G43.909 MIGRAINE WITHOUT STATUS MIGRAINOSUS, NOT INTRACTABLE, UNSPECIFIED MIGRAINE TYPE: ICD-10-CM

## 2025-01-25 DIAGNOSIS — G43.719 CHRONIC MIGRAINE WITHOUT AURA, INTRACTABLE, WITHOUT STATUS MIGRAINOSUS: ICD-10-CM

## 2025-01-27 RX ORDER — SYRINGE W-NEEDLE,DISPOSAB,3 ML 25GX5/8"
SYRINGE, EMPTY DISPOSABLE MISCELLANEOUS DAILY
Qty: 1 EACH | Refills: 0 | Status: SHIPPED | OUTPATIENT
Start: 2025-01-27

## 2025-01-27 RX ORDER — KETOROLAC TROMETHAMINE 15 MG/ML
15 INJECTION, SOLUTION INTRAMUSCULAR; INTRAVENOUS ONCE AS NEEDED
Qty: 1 ML | Refills: 0 | Status: SHIPPED | OUTPATIENT
Start: 2025-01-27 | End: 2025-01-28

## 2025-01-28 DIAGNOSIS — G43.909 MIGRAINE WITHOUT STATUS MIGRAINOSUS, NOT INTRACTABLE, UNSPECIFIED MIGRAINE TYPE: ICD-10-CM

## 2025-01-28 RX ORDER — KETOROLAC TROMETHAMINE 15 MG/ML
15 INJECTION, SOLUTION INTRAMUSCULAR; INTRAVENOUS ONCE AS NEEDED
Qty: 1 ML | Refills: 0 | Status: SHIPPED | OUTPATIENT
Start: 2025-01-28

## 2025-01-30 DIAGNOSIS — G43.909 MIGRAINE WITHOUT STATUS MIGRAINOSUS, NOT INTRACTABLE, UNSPECIFIED MIGRAINE TYPE: ICD-10-CM

## 2025-01-30 RX ORDER — KETOROLAC TROMETHAMINE 15 MG/ML
15 INJECTION, SOLUTION INTRAMUSCULAR; INTRAVENOUS ONCE AS NEEDED
Qty: 1 ML | Refills: 0 | OUTPATIENT
Start: 2025-01-30

## 2025-02-06 DIAGNOSIS — G43.719 CHRONIC MIGRAINE WITHOUT AURA, INTRACTABLE, WITHOUT STATUS MIGRAINOSUS: ICD-10-CM

## 2025-02-07 RX ORDER — GALCANEZUMAB 120 MG/ML
1 INJECTION, SOLUTION SUBCUTANEOUS
Qty: 3 ML | Refills: 0 | Status: SHIPPED | OUTPATIENT
Start: 2025-02-07

## 2025-02-12 ENCOUNTER — PATIENT MESSAGE (OUTPATIENT)
Dept: INTERNAL MEDICINE CLINIC | Facility: CLINIC | Age: 60
End: 2025-02-12

## 2025-02-13 DIAGNOSIS — G43.719 CHRONIC MIGRAINE WITHOUT AURA, INTRACTABLE, WITHOUT STATUS MIGRAINOSUS: ICD-10-CM

## 2025-02-13 RX ORDER — KETOROLAC TROMETHAMINE 30 MG/ML
30 INJECTION, SOLUTION INTRAMUSCULAR; INTRAVENOUS ONCE
Qty: 1 ML | Refills: 3 | Status: SHIPPED | OUTPATIENT
Start: 2025-02-13 | End: 2025-02-13 | Stop reason: SDUPTHER

## 2025-02-13 RX ORDER — KETOROLAC TROMETHAMINE 30 MG/ML
30 INJECTION, SOLUTION INTRAMUSCULAR; INTRAVENOUS ONCE
Qty: 1 ML | Refills: 3 | Status: SHIPPED | OUTPATIENT
Start: 2025-02-13 | End: 2025-02-13

## 2025-02-18 ENCOUNTER — TELEPHONE (OUTPATIENT)
Age: 60
End: 2025-02-18

## 2025-02-18 NOTE — TELEPHONE ENCOUNTER
Patients GI provider:  Dr. Ball    Number to return call: [phone]    Reason for call: Pt calling to ask if Dr Ball would like to see her back for a f/u and if so if it can be virtual. I did look in the last virtual OV but did not see a f/u recommendation. Pt did say she if having no symptoms. Please reach back out to the pt to discuss if a f/u is needed    Scheduled procedure/appointment date if applicable: Apt/procedure

## 2025-02-20 ENCOUNTER — TELEPHONE (OUTPATIENT)
Dept: GASTROENTEROLOGY | Facility: CLINIC | Age: 60
End: 2025-02-20

## 2025-03-04 ENCOUNTER — TELEPHONE (OUTPATIENT)
Age: 60
End: 2025-03-04

## 2025-03-04 NOTE — TELEPHONE ENCOUNTER
Contacted patient and made her aware her appointment with ISREAL Topete was updated to a virtual visit per request. Advised that moving forward, we do have an office in [place] and she can be scheduled for appointments there. She verbalized understanding.

## 2025-03-04 NOTE — TELEPHONE ENCOUNTER
Pt under care of:  Yoselyn Ivy  Office Location: [place]    Last Seen (include Follow Up recommendations of last visit- see Office Visit - Instructions): 4/19/2024 - Return in 1 year for 1 year renal MRI, BMP, CXR.     Pt calling due to: Schedule 1 year follow up    Active Symptoms?  Explain: N/A    Pt can be reached at: [phone]    Appointment Details  Date:  4/25/2025  Time:   10:40am   Location: [place]   Provider: Yoselyn Ivy      Does the appointment need further review? Yes - pt requesting this appointment is held virtually.

## 2025-03-09 DIAGNOSIS — F32.A DEPRESSION, UNSPECIFIED DEPRESSION TYPE: ICD-10-CM

## 2025-03-10 RX ORDER — BUPROPION HYDROCHLORIDE 300 MG/1
300 TABLET ORAL EVERY MORNING
Qty: 90 TABLET | Refills: 1 | Status: SHIPPED | OUTPATIENT
Start: 2025-03-10 | End: 2025-03-18 | Stop reason: SDUPTHER

## 2025-03-10 RX ORDER — DESVENLAFAXINE SUCCINATE 50 MG
50 TABLET, EXTENDED RELEASE 24 HR ORAL DAILY
Qty: 30 TABLET | Refills: 5 | Status: SHIPPED | OUTPATIENT
Start: 2025-03-10

## 2025-03-10 NOTE — PATIENT COMMUNICATION
Pt called stating she received a call from the pharmacy stating a prescription was called in for Ketoralac. Pt states she called them about the Ketoralac a few weeks ago but not recently.    Pt made aware that the last prescription for Ketoralac was sent to the pharmacy for her on 2/13/25.  CTS suggested patient call the pharmacy to check if it was on back order from that time.    Pt agreeable.

## 2025-03-17 ENCOUNTER — TELEPHONE (OUTPATIENT)
Age: 60
End: 2025-03-17

## 2025-03-17 NOTE — TELEPHONE ENCOUNTER
Patient called, they stated a prior auth was needed for their medication. She stated is needs to be the Brand and not generic.     buPROPion (WELLBUTRIN XL) 300 mg 24 hr tablet      The patient would like a call back with an update once we here from their insurance.     Please advise, thank you

## 2025-03-17 NOTE — TELEPHONE ENCOUNTER
Can you please change the script to say Brand if that is what the patient needs to move forward with PA     buPROPion (WELLBUTRIN XL) 300 mg 24 hr tab   Thank you

## 2025-03-18 DIAGNOSIS — F32.A DEPRESSION, UNSPECIFIED DEPRESSION TYPE: ICD-10-CM

## 2025-03-18 RX ORDER — BUPROPION HYDROCHLORIDE 300 MG/1
300 TABLET ORAL EVERY MORNING
Qty: 90 TABLET | Refills: 1 | Status: SHIPPED | OUTPATIENT
Start: 2025-03-18 | End: 2025-03-19 | Stop reason: CLARIF

## 2025-03-19 ENCOUNTER — NURSE TRIAGE (OUTPATIENT)
Age: 60
End: 2025-03-19

## 2025-03-19 DIAGNOSIS — F32.A DEPRESSION, UNSPECIFIED DEPRESSION TYPE: ICD-10-CM

## 2025-03-19 RX ORDER — BUPROPION HYDROCHLORIDE 300 MG/1
300 TABLET ORAL EVERY MORNING
Qty: 90 TABLET | Refills: 1 | Status: SHIPPED | OUTPATIENT
Start: 2025-03-19 | End: 2025-03-26 | Stop reason: SDUPTHER

## 2025-03-19 NOTE — TELEPHONE ENCOUNTER
"Rx send to the correct pharmacy [facility] pharmacy per patient's request.      Reason for Disposition   Prescription prescribed recently is not at pharmacy and triager has access to patient's EMR and prescription is recorded in the EMR    Answer Assessment - Initial Assessment Questions  1. NAME of MEDICINE: \"What medicine(s) are you calling about?\"      buPROPion (WELLBUTRIN XL) 300 mg 24 hr tablet    2. QUESTION: \"What is your question?\" (e.g., double dose of medicine, side effect)      Send to the wrong pharmacy. Patient prefers [facility]   3. PRESCRIBER: \"Who prescribed the medicine?\" Reason: if prescribed by specialist, call should be referred to that group.      Debbie Flood PA-C    Protocols used: Medication Question Call-Adult-[place]    "

## 2025-03-20 ENCOUNTER — TELEPHONE (OUTPATIENT)
Age: 60
End: 2025-03-20

## 2025-03-20 NOTE — TELEPHONE ENCOUNTER
Patient is calling regarding the prior auth for the   buPROPion (WELLBUTRIN XL) 300 mg 24 hr tablet and has to be the brand name to [facility] on file, patient only has 7 pills left. Please call patient back, leave VM if no answer.

## 2025-03-20 NOTE — TELEPHONE ENCOUNTER
PA for buPROPion (WELLBUTRIN XL) 300 mg 24 hr tablet SUBMITTED to     via    [x]CMM-KEY: [id number]  []Surescripts-Case ID #   []Availity-Auth ID # NDC #   []Faxed to plan   []Other website   []Phone call Case ID #     [x]PA sent as URGENT    All office notes, labs and other pertaining documents and studies sent. Clinical questions answered. Awaiting determination from insurance company.     Turnaround time for your insurance to make a decision on your Prior Authorization can take 7-21 business days.

## 2025-03-24 DIAGNOSIS — K74.3 PRIMARY BILIARY CHOLANGITIS (HCC): ICD-10-CM

## 2025-03-24 NOTE — TELEPHONE ENCOUNTER
Patient called to follow up on medication prior authorization. I informed patient that prior authorization has been submitted as urgent.     Patient expressed understanding.

## 2025-03-24 NOTE — TELEPHONE ENCOUNTER
PA for buPROPion (WELLBUTRIN XL) 300 mg 24 hr tablet  NOT REQUIRED     Reason (screenshot if applicable)               Pharmacy advised by    [x]Fax  []Phone call

## 2025-03-24 NOTE — TELEPHONE ENCOUNTER
Patient called in stating that she has a discount card for the WellButrin xl. The [facility] told the patient that they can't put the wellbutrin xl through the system, but it will allow them to put the generic. Patient wants the name brand due to having the discount card. Patient is asking if we could reach out to pharmacy and see what is going on. Please advise and after speaking with pharmacy if we could call the patient back at [phone]

## 2025-03-24 NOTE — TELEPHONE ENCOUNTER
"Roman with [employer] ([phone]) request prior authorization be placed for Wellbutrin  mg \"BRAND NECESSARY\".      Please submitted as an \"urgent\" request as patient is almost out of medication.    Generic buPROPion is covered, but patient needs brand necessary.  "

## 2025-03-25 DIAGNOSIS — K59.09 CHRONIC CONSTIPATION: ICD-10-CM

## 2025-03-25 RX ORDER — URSODIOL 300 MG/1
300 CAPSULE ORAL 2 TIMES DAILY
Qty: 180 CAPSULE | Refills: 1 | Status: SHIPPED | OUTPATIENT
Start: 2025-03-25

## 2025-03-25 RX ORDER — LUBIPROSTONE 24 UG/1
CAPSULE ORAL
Qty: 180 CAPSULE | Refills: 3 | Status: SHIPPED | OUTPATIENT
Start: 2025-03-25

## 2025-03-25 NOTE — TELEPHONE ENCOUNTER
Patient is requesting a new script be sent in for Wellbutrin not Bupropion she needs the brand so she can use her discount card. Insurance needs an updated prior Auth request with Wellbutrin and not generic.

## 2025-03-26 DIAGNOSIS — F32.A DEPRESSION, UNSPECIFIED DEPRESSION TYPE: ICD-10-CM

## 2025-03-26 RX ORDER — BUPROPION HYDROCHLORIDE 300 MG/1
300 TABLET ORAL EVERY MORNING
Qty: 90 TABLET | Refills: 1 | Status: SHIPPED | OUTPATIENT
Start: 2025-03-26 | End: 2025-03-27 | Stop reason: SDUPTHER

## 2025-03-26 RX ORDER — DESVENLAFAXINE 100 MG/1
100 TABLET, EXTENDED RELEASE ORAL DAILY
Qty: 30 TABLET | Refills: 2 | Status: SHIPPED | OUTPATIENT
Start: 2025-03-26

## 2025-03-27 ENCOUNTER — TELEPHONE (OUTPATIENT)
Age: 60
End: 2025-03-27

## 2025-03-27 DIAGNOSIS — F32.A DEPRESSION, UNSPECIFIED DEPRESSION TYPE: ICD-10-CM

## 2025-03-27 DIAGNOSIS — F32.1 CURRENT MODERATE EPISODE OF MAJOR DEPRESSIVE DISORDER WITHOUT PRIOR EPISODE (HCC): Primary | ICD-10-CM

## 2025-03-27 RX ORDER — DESVENLAFAXINE 100 MG/1
100 TABLET, EXTENDED RELEASE ORAL DAILY
Qty: 90 TABLET | Refills: 1 | Status: SHIPPED | OUTPATIENT
Start: 2025-03-27

## 2025-03-27 RX ORDER — BUPROPION HYDROCHLORIDE 300 MG/1
300 TABLET ORAL EVERY MORNING
Qty: 7 TABLET | Refills: 0 | Status: SHIPPED | OUTPATIENT
Start: 2025-03-27 | End: 2025-04-03

## 2025-03-27 NOTE — TELEPHONE ENCOUNTER
"Patient request Pristiq 100 mg script recently sent to [facility] be changed to BRAND NECESSARY.    Patient has a coupon for discounted cost, but needs to be \"brand necessary\" script.    Please contact pharmacy to change, and advise patient when completed.  "

## 2025-04-02 ENCOUNTER — APPOINTMENT (OUTPATIENT)
Dept: LAB | Facility: CLINIC | Age: 60
End: 2025-04-02
Payer: COMMERCIAL

## 2025-04-02 DIAGNOSIS — C64.1 RENAL CELL CARCINOMA OF RIGHT KIDNEY (HCC): ICD-10-CM

## 2025-04-02 LAB
ANION GAP SERPL CALCULATED.3IONS-SCNC: 8 MMOL/L (ref 4–13)
BUN SERPL-MCNC: 15 MG/DL (ref 5–25)
CALCIUM SERPL-MCNC: 9.4 MG/DL (ref 8.4–10.2)
CHLORIDE SERPL-SCNC: 100 MMOL/L (ref 96–108)
CO2 SERPL-SCNC: 30 MMOL/L (ref 21–32)
CREAT SERPL-MCNC: 0.79 MG/DL (ref 0.6–1.3)
GFR SERPL CREATININE-BSD FRML MDRD: 82 ML/MIN/1.73SQ M
GLUCOSE P FAST SERPL-MCNC: 92 MG/DL (ref 65–99)
POTASSIUM SERPL-SCNC: 4.1 MMOL/L (ref 3.5–5.3)
SODIUM SERPL-SCNC: 138 MMOL/L (ref 135–147)

## 2025-04-02 PROCEDURE — 80048 BASIC METABOLIC PNL TOTAL CA: CPT

## 2025-04-02 PROCEDURE — 36415 COLL VENOUS BLD VENIPUNCTURE: CPT

## 2025-04-05 ENCOUNTER — HOSPITAL ENCOUNTER (OUTPATIENT)
Dept: RADIOLOGY | Facility: HOSPITAL | Age: 60
Discharge: HOME | End: 2025-04-05
Payer: COMMERCIAL

## 2025-04-05 DIAGNOSIS — C64.1 RENAL CELL CARCINOMA OF RIGHT KIDNEY (HCC): ICD-10-CM

## 2025-04-05 PROCEDURE — 71046 X-RAY EXAM CHEST 2 VIEWS: CPT

## 2025-04-12 ENCOUNTER — HOSPITAL ENCOUNTER (OUTPATIENT)
Dept: MRI IMAGING | Facility: HOSPITAL | Age: 60
Discharge: HOME | End: 2025-04-12
Payer: COMMERCIAL

## 2025-04-12 DIAGNOSIS — C64.1 RENAL CELL CARCINOMA OF RIGHT KIDNEY (HCC): ICD-10-CM

## 2025-04-12 PROCEDURE — A9585 GADOBUTROL INJECTION: HCPCS | Performed by: PHYSICIAN ASSISTANT

## 2025-04-12 PROCEDURE — 74183 MRI ABD W/O CNTR FLWD CNTR: CPT

## 2025-04-12 RX ORDER — GADOBUTROL 604.72 MG/ML
6 INJECTION INTRAVENOUS
Status: COMPLETED | OUTPATIENT
Start: 2025-04-12 | End: 2025-04-12

## 2025-04-12 RX ADMIN — GADOBUTROL 6 ML: 604.72 INJECTION INTRAVENOUS at 10:00

## 2025-04-14 ENCOUNTER — TELEPHONE (OUTPATIENT)
Age: 60
End: 2025-04-14

## 2025-04-14 NOTE — TELEPHONE ENCOUNTER
Patient states that since she started taking Pristiq 100mg has been giving her headaches. Patient is asking if the provider can call her back on what to do, patient states she has not taken the medication today.

## 2025-04-15 ENCOUNTER — RESULTS FOLLOW-UP (OUTPATIENT)
Dept: UROLOGY | Facility: CLINIC | Age: 60
End: 2025-04-15

## 2025-04-22 DIAGNOSIS — F32.A DEPRESSION, UNSPECIFIED DEPRESSION TYPE: ICD-10-CM

## 2025-04-22 RX ORDER — BUPROPION HYDROCHLORIDE 450 MG/1
450 TABLET, FILM COATED, EXTENDED RELEASE ORAL EVERY MORNING
Qty: 90 TABLET | Refills: 0 | Status: SHIPPED | OUTPATIENT
Start: 2025-04-22 | End: 2025-04-25 | Stop reason: SDUPTHER

## 2025-04-25 ENCOUNTER — NURSE TRIAGE (OUTPATIENT)
Dept: OTHER | Facility: OTHER | Age: 60
End: 2025-04-25

## 2025-04-25 DIAGNOSIS — F32.A DEPRESSION, UNSPECIFIED DEPRESSION TYPE: ICD-10-CM

## 2025-04-25 RX ORDER — BUPROPION HYDROCHLORIDE 450 MG/1
450 TABLET, FILM COATED, EXTENDED RELEASE ORAL EVERY MORNING
Qty: 90 TABLET | Refills: 1 | Status: SHIPPED | OUTPATIENT
Start: 2025-04-25

## 2025-04-25 NOTE — TELEPHONE ENCOUNTER
"FOLLOW UP: Please follow up with patient when office is open about contacting [facility] to verify dosage increase    REASON FOR CONVERSATION: Medication Refill    SYMPTOMS: Requesting [facility] be called to verify dosage increase for bupropion 450 mg.    OTHER: N/A    DISPOSITION: Call PCP When Office is Open      Reason for Disposition   [1] Caller has NON-URGENT medicine question about med that PCP prescribed AND [2] triager unable to answer question    Additional Information   Medicine question not related to refill or renewal    Answer Assessment - Initial Assessment Questions  1. NAME of MEDICINE: \"What medicine(s) are you calling about?\"        Bupropion 450 mg once a day    2. QUESTION: \"What is your question?\" (e.g., double dose of medicine, side effect)        Patients dosage was recently increased and states that [facility] needs [facility] before filling prescription    3. PRESCRIBER: \"Who prescribed the medicine?\" Reason: if prescribed by specialist, call should be referred to that group.        Debbie Flood PA-C    Protocols used: Medication Refill and Renewal Call-Adult-, Medication Question Call-Adult-    "

## 2025-04-25 NOTE — TELEPHONE ENCOUNTER
Pt called back to f/u.  Informed pt that PCP has resent medication as requested and advised pt to call [facility] to confirm they rec'd prescription.  Pt verbalized understanding.  No further questions or concerns at this time.

## 2025-04-29 DIAGNOSIS — E53.8 FOLIC ACID DEFICIENCY: ICD-10-CM

## 2025-04-29 RX ORDER — FOLIC ACID 1 MG/1
1000 TABLET ORAL DAILY
Qty: 90 TABLET | Refills: 1 | Status: SHIPPED | OUTPATIENT
Start: 2025-04-29

## 2025-05-06 DIAGNOSIS — M81.0 AGE-RELATED OSTEOPOROSIS WITHOUT CURRENT PATHOLOGICAL FRACTURE: ICD-10-CM

## 2025-05-06 RX ORDER — DENOSUMAB 60 MG/ML
INJECTION SUBCUTANEOUS
Qty: 1 ML | Refills: 0 | Status: SHIPPED | OUTPATIENT
Start: 2025-05-06

## 2025-05-07 DIAGNOSIS — G43.719 CHRONIC MIGRAINE WITHOUT AURA, INTRACTABLE, WITHOUT STATUS MIGRAINOSUS: ICD-10-CM

## 2025-05-07 RX ORDER — GALCANEZUMAB 120 MG/ML
1 INJECTION, SOLUTION SUBCUTANEOUS
Qty: 3 ML | Refills: 0 | Status: SHIPPED | OUTPATIENT
Start: 2025-05-07

## 2025-05-14 ENCOUNTER — OFFICE VISIT (OUTPATIENT)
Dept: INTERNAL MEDICINE CLINIC | Facility: CLINIC | Age: 60
End: 2025-05-14
Payer: COMMERCIAL

## 2025-05-14 VITALS
WEIGHT: 135 LBS | BODY MASS INDEX: 19.99 KG/M2 | HEIGHT: 69 IN | DIASTOLIC BLOOD PRESSURE: 90 MMHG | SYSTOLIC BLOOD PRESSURE: 128 MMHG | HEART RATE: 95 BPM | TEMPERATURE: 97.2 F | OXYGEN SATURATION: 98 %

## 2025-05-14 DIAGNOSIS — Z13.29 SCREENING FOR THYROID DISORDER: ICD-10-CM

## 2025-05-14 DIAGNOSIS — Z13.1 SCREENING FOR DIABETES MELLITUS: ICD-10-CM

## 2025-05-14 DIAGNOSIS — Z13.0 SCREENING FOR DEFICIENCY ANEMIA: ICD-10-CM

## 2025-05-14 DIAGNOSIS — Z23 ENCOUNTER FOR IMMUNIZATION: ICD-10-CM

## 2025-05-14 DIAGNOSIS — Z13.220 SCREENING, LIPID: ICD-10-CM

## 2025-05-14 DIAGNOSIS — K74.3 PRIMARY BILIARY CHOLANGITIS (HCC): ICD-10-CM

## 2025-05-14 DIAGNOSIS — E55.9 VITAMIN D DEFICIENCY: ICD-10-CM

## 2025-05-14 DIAGNOSIS — Z12.31 ENCOUNTER FOR SCREENING MAMMOGRAM FOR BREAST CANCER: ICD-10-CM

## 2025-05-14 DIAGNOSIS — Z00.00 ANNUAL PHYSICAL EXAM: Primary | ICD-10-CM

## 2025-05-14 DIAGNOSIS — E78.5 HYPERLIPIDEMIA, UNSPECIFIED HYPERLIPIDEMIA TYPE: ICD-10-CM

## 2025-05-14 PROCEDURE — 99396 PREV VISIT EST AGE 40-64: CPT | Performed by: PHYSICIAN ASSISTANT

## 2025-05-14 PROCEDURE — 90471 IMMUNIZATION ADMIN: CPT

## 2025-05-14 PROCEDURE — 90677 PCV20 VACCINE IM: CPT

## 2025-05-14 NOTE — PATIENT INSTRUCTIONS
"Patient Education     Routine physical for adults   The Basics   Written by the doctors and editors at [facility]   What is a physical? -- A physical is a routine visit, or \"check-up,\" with your doctor. You might also hear it called a \"wellness visit\" or \"preventive visit.\"  During each visit, the doctor will:   Ask about your physical and mental health   Ask about your habits, behaviors, and lifestyle   Do an exam   Give you vaccines if needed   Talk to you about any medicines you take   Give advice about your health   Answer your questions  Getting regular check-ups is an important part of taking care of your health. It can help your doctor find and treat any problems you have. But it's also important for preventing health problems.  A routine physical is different from a \"sick visit.\" A sick visit is when you see a doctor because of a health concern or problem. Since physicals are scheduled ahead of time, you can think about what you want to ask the doctor.  How often should I get a physical? -- It depends on your age and health. In general, for people age 21 years and older:   If you are younger than 50 years, you might be able to get a physical every 3 years.   If you are 50 years or older, your doctor might recommend a physical every year.  If you have an ongoing health condition, like diabetes or high blood pressure, your doctor will probably want to see you more often.  What happens during a physical? -- In general, each visit will include:   Physical exam - The doctor or nurse will check your height, weight, heart rate, and blood pressure. They will also look at your eyes and ears. They will ask about how you are feeling and whether you have any symptoms that bother you.   Medicines - It's a good idea to bring a list of all the medicines you take to each doctor visit. Your doctor will talk to you about your medicines and answer any questions. Tell them if you are having any side effects that bother you. You " "should also tell them if you are having trouble paying for any of your medicines.   Habits and behaviors - This includes:   Your diet   Your exercise habits   Whether you smoke, drink alcohol, or use drugs   Whether you are sexually active   Whether you feel safe at home  Your doctor will talk to you about things you can do to improve your health and lower your risk of health problems. They will also offer help and support. For example, if you want to quit smoking, they can give you advice and might prescribe medicines. If you want to improve your diet or get more physical activity, they can help you with this, too.   Lab tests, if needed - The tests you get will depend on your age and situation. For example, your doctor might want to check your:   Cholesterol   Blood sugar   Iron level   Vaccines - The recommended vaccines will depend on your age, health, and what vaccines you already had. Vaccines are very important because they can prevent certain serious or deadly infections.   Discussion of screening - \"Screening\" means checking for diseases or other health problems before they cause symptoms. Your doctor can recommend screening based on your age, risk, and preferences. This might include tests to check for:   Cancer, such as breast, prostate, cervical, ovarian, colorectal, prostate, lung, or skin cancer   Sexually transmitted infections, such as chlamydia and gonorrhea   Mental health conditions like depression and anxiety  Your doctor will talk to you about the different types of screening tests. They can help you decide which screenings to have. They can also explain what the results might mean.   Answering questions - The physical is a good time to ask the doctor or nurse questions about your health. If needed, they can refer you to other doctors or specialists, too.  Adults older than 65 years often need other care, too. As you get older, your doctor will talk to you about:   How to prevent falling at " home   Hearing or vision tests   Memory testing   How to take your medicines safely   Making sure that you have the help and support you need at home  All topics are updated as new evidence becomes available and our peer review process is complete.  This topic retrieved from [employer] on: May 02, 2024.  Topic 377088 Version 1.0  Release: 32.4.3 - C32.122  © 2024 UpToDate, Inc. and/or its affiliates. All rights reserved.  Consumer Information Use and Disclaimer   Disclaimer: This generalized information is a limited summary of diagnosis, treatment, and/or medication information. It is not meant to be comprehensive and should be used as a tool to help the user understand and/or assess potential diagnostic and treatment options. It does NOT include all information about conditions, treatments, medications, side effects, or risks that may apply to a specific patient. It is not intended to be medical advice or a substitute for the medical advice, diagnosis, or treatment of a health care provider based on the health care provider's examination and assessment of a patient's specific and unique circumstances. Patients must speak with a health care provider for complete information about their health, medical questions, and treatment options, including any risks or benefits regarding use of medications. This information does not endorse any treatments or medications as safe, effective, or approved for treating a specific patient. UpToDate, Inc. and its affiliates disclaim any warranty or liability relating to this information or the use thereof.The use of this information is governed by the Terms of Use, available at https://www.w[employer] Flipuwer.com/en/know/clinical-effectiveness-terms. 2024© UpToDate, Inc. and its affiliates and/or licensors. All rights reserved.  Copyright   © 2024 UpToDate, Inc. and/or its affiliates. All rights reserved.

## 2025-05-14 NOTE — PROGRESS NOTES
Adult Annual Physical  Name: Tiffani Lancaster      : 1965      MRN: [id number]  Encounter Provider: Debbie Flood PA-C  Encounter Date: 2025   Encounter department: [facility]    :  Assessment & Plan  Encounter for immunization         Encounter for screening mammogram for breast cancer    Orders:  •  Mammo screening bilateral w 3d and cad; Future    Vitamin D deficiency    Orders:  •  Vitamin D 25 hydroxy; Future    Hyperlipidemia, unspecified hyperlipidemia type         Screening, lipid    Orders:  •  Lipid panel; Future    Screening for thyroid disorder    Orders:  •  TSH, 3rd generation; Future    Screening for diabetes mellitus    Orders:  •  Comprehensive metabolic panel; Future    Screening for deficiency anemia    Orders:  •  CBC and differential; Future    Primary biliary cholangitis (HCC)         Annual physical exam             Preventive Screenings:  - Diabetes Screening: screening up-to-date  - Cholesterol Screening: screening not indicated and has hyperlipidemia   - Hepatitis C screening: screening up-to-date   - HIV screening: screening up-to-date   - Cervical cancer screening: has history of cervical cancer and screening not indicated   - Breast cancer screening: screening up-to-date   - Colon cancer screening: screening up-to-date   - Lung cancer screening: screening not indicated   - Osteoporosis screening: has osteoporosis and screening not indicated     Immunizations:  - Immunizations due: Prevnar 20, Zoster (Shingrix) and Hepatitis A      Depression Screening and Follow-up Plan: Patient was screened for depression during today's encounter. They screened negative with a PHQ-9 score of 0.          History of Present Illness     Adult Annual Physical:  Patient presents for annual physical.     Diet and Physical Activity:  - Diet/Nutrition: no special diet.  - Exercise: no formal exercise.    Depression Screening:    - PHQ-9 Score: 0    General Health:  - Sleep:  "sleeps well and 7-8 hours of sleep on average.  - Hearing: normal hearing bilateral ears.  - Vision: no vision problems.  - Dental: regular dental visits.    /GYN Health:  - Follows with GYN: yes.   - History of STDs: no    Advanced Care Planning:  - Has an advanced directive?: no    - Has a durable medical POA?: no    - ACP document given to patient?: no      Review of Systems   Constitutional:  Negative for chills and fever.   HENT:  Negative for congestion, ear pain, hearing loss, postnasal drip, rhinorrhea, sinus pressure, sinus pain, sore throat and trouble swallowing.    Eyes:  Negative for pain and visual disturbance.   Respiratory:  Negative for cough, chest tightness, shortness of breath and wheezing.    Cardiovascular: Negative.  Negative for chest pain, palpitations and leg swelling.   Gastrointestinal:  Negative for abdominal pain, blood in stool, constipation, diarrhea, nausea and vomiting.   Endocrine: Negative for cold intolerance, heat intolerance, polydipsia, polyphagia and polyuria.   Genitourinary:  Negative for difficulty urinating, dysuria, flank pain and urgency.   Musculoskeletal:  Negative for arthralgias, back pain, gait problem and myalgias.   Skin:  Negative for rash.   Allergic/Immunologic: Negative.    Neurological:  Negative for dizziness, weakness, light-headedness and headaches.   Hematological: Negative.    Psychiatric/Behavioral:  Negative for behavioral problems, dysphoric mood and sleep disturbance. The patient is not nervous/anxious.          Objective   /90 (Patient Position: Sitting, Cuff Size: Large)   Pulse 95   Temp (!) 97.2 °F (36.2 °C) (Tympanic)   Ht 5' 9\" (1.753 m)   Wt 61.2 kg (135 lb)   SpO2 98%   BMI 19.94 kg/m²     Physical Exam  Constitutional:       Appearance: She is well-developed.   HENT:      Head: Normocephalic and atraumatic.      Right Ear: External ear normal.      Left Ear: External ear normal.      Nose: Nose normal.     Eyes:      " Conjunctiva/sclera: Conjunctivae normal.       Cardiovascular:      Rate and Rhythm: Normal rate and regular rhythm.      Heart sounds: Normal heart sounds.   Pulmonary:      Effort: Pulmonary effort is normal.      Breath sounds: Normal breath sounds.   Abdominal:      General: Bowel sounds are normal.      Palpations: Abdomen is soft.     Musculoskeletal:         General: Normal range of motion.      Cervical back: Normal range of motion and neck supple.     Skin:     General: Skin is warm and dry.     Neurological:      Mental Status: She is alert and oriented to person, place, and time.     Psychiatric:         Behavior: Behavior normal.         Thought Content: Thought content normal.         Judgment: Judgment normal.

## 2025-05-15 ENCOUNTER — TELEMEDICINE (OUTPATIENT)
Dept: UROLOGY | Facility: CLINIC | Age: 60
End: 2025-05-15
Payer: COMMERCIAL

## 2025-05-15 DIAGNOSIS — M81.0 AGE-RELATED OSTEOPOROSIS WITHOUT CURRENT PATHOLOGICAL FRACTURE: ICD-10-CM

## 2025-05-15 DIAGNOSIS — C64.1 RENAL CELL CARCINOMA OF RIGHT KIDNEY (HCC): Primary | ICD-10-CM

## 2025-05-15 PROCEDURE — 99213 OFFICE O/P EST LOW 20 MIN: CPT | Performed by: PHYSICIAN ASSISTANT

## 2025-05-15 NOTE — ASSESSMENT & PLAN NOTE
She is 3 years status post biopsy and cryoablation of a 1.3 cm right renal mass.  She has done extremely well.  She has no sequelae from her procedure.  Guideline surveillance imaging with chest x-ray and MRI abdomen have been no evidence of residual, recurrent, nor metastatic disease.  Will continue through year 2027 total of 5 years of survivorship surveillance.  She is doing well well overall, no voiding bother or discomfort, no hematuria urinary tract infections.  Her creatinine is 0.7.  Orders:    MRI abdomen w wo contrast; Future    XR chest pa and lateral; Future    Basic metabolic panel; Future

## 2025-05-16 RX ORDER — DENOSUMAB 60 MG/ML
INJECTION SUBCUTANEOUS
Qty: 1 ML | Refills: 0 | OUTPATIENT
Start: 2025-05-16

## 2025-05-19 ENCOUNTER — TELEPHONE (OUTPATIENT)
Age: 60
End: 2025-05-19

## 2025-05-19 DIAGNOSIS — E78.5 HYPERLIPIDEMIA, UNSPECIFIED HYPERLIPIDEMIA TYPE: ICD-10-CM

## 2025-05-19 RX ORDER — ROSUVASTATIN CALCIUM 10 MG/1
10 TABLET, COATED ORAL DAILY
Qty: 90 TABLET | Refills: 1 | Status: SHIPPED | OUTPATIENT
Start: 2025-05-19

## 2025-05-19 NOTE — TELEPHONE ENCOUNTER
Patient called in stating her prior authorization for her Prolia 60 MG/ML is . She was notified that a new one needs to be submitted to her insurance. Please advise.

## 2025-05-21 ENCOUNTER — TELEPHONE (OUTPATIENT)
Age: 60
End: 2025-05-21

## 2025-05-21 NOTE — TELEPHONE ENCOUNTER
[facility] in with question on patient prolia shot call was transferred to the office to further assist.

## 2025-05-22 ENCOUNTER — TELEMEDICINE (OUTPATIENT)
Dept: GASTROENTEROLOGY | Facility: CLINIC | Age: 60
End: 2025-05-22

## 2025-05-22 VITALS — HEIGHT: 69 IN | BODY MASS INDEX: 19.99 KG/M2 | WEIGHT: 135 LBS

## 2025-05-22 DIAGNOSIS — G43.719 CHRONIC MIGRAINE WITHOUT AURA, INTRACTABLE, WITHOUT STATUS MIGRAINOSUS: ICD-10-CM

## 2025-05-22 DIAGNOSIS — K74.3 PRIMARY BILIARY CHOLANGITIS (HCC): Primary | ICD-10-CM

## 2025-05-22 DIAGNOSIS — K59.04 CHRONIC IDIOPATHIC CONSTIPATION: ICD-10-CM

## 2025-05-22 RX ORDER — URSODIOL 300 MG/1
300 CAPSULE ORAL 2 TIMES DAILY
Qty: 180 CAPSULE | Refills: 3 | Status: SHIPPED | OUTPATIENT
Start: 2025-05-22

## 2025-05-22 NOTE — PROGRESS NOTES
Name: Tiffani Lancaster      : 1965      MRN: [id number]  Encounter Provider: Naomi Ball MD  Encounter Date: 2025   Encounter department: [facility] [place]  :  Assessment & Plan  Primary biliary cholangitis (HCC)  No cirrhosis. Diagnosed  in setting of chronic alkaline phosphatase elevations and positive AMA.  24 normal alkaline phosphatase on ursodiol.  Will check liver enzymes q6m and continue current therapy  Orders:    Hepatic function panel; Standing    ursodiol (ACTIGALL) 300 mg capsule; Take 1 capsule (300 mg total) by mouth 2 (two) times a day    Chronic idiopathic constipation  Well controlled on amitiza 24 mcg twice daily, continue         Assessment & Plan        History of Present Illness   History of Present Illness    Tiffani Lancaster is a 59 y.o. female was seen via telehealth for PBC.    Doing well.  No nausea or reflux.  Taking amitiza for constipation which is working well.  Taking ursodiol, no issues with that.      Administrative Statements   Encounter provider Naomi Ball MD    The Patient is located at Other and in the following state in which I hold an active license [place].    The patient was identified by name and date of birth. Tiffani Lancaster was informed that this is a telemedicine visit and that the visit is being conducted through the Epic Embedded platform. She agrees to proceed..  My office door was closed. No one else was in the room.  She acknowledged consent and understanding of privacy and security of the video platform. The patient has agreed to participate and understands they can discontinue the visit at any time.    I have spent a total time of 15 minutes in caring for this patient on the day of the visit/encounter including Diagnostic results, Instructions for management, Impressions, Documenting in the medical record, Reviewing/placing orders in the medical record (including tests, medications, and/or procedures), and  "Obtaining or reviewing history  , not including the time spent for establishing the audio/video connection.        History obtained from: patient    Review of Systems   Constitutional:  Negative for chills and fever.   HENT:  Negative for ear pain and sore throat.    Eyes:  Negative for pain and visual disturbance.   Respiratory:  Negative for cough and shortness of breath.    Cardiovascular:  Negative for chest pain and palpitations.   Gastrointestinal:  Negative for abdominal pain and vomiting.   Genitourinary:  Negative for dysuria and hematuria.   Musculoskeletal:  Negative for arthralgias and back pain.   Skin:  Negative for color change and rash.   Neurological:  Negative for seizures and syncope.   All other systems reviewed and are negative.         Objective   Ht 5' 9\" (1.753 m) Comment: patient reported  Wt 61.2 kg (135 lb) Comment: patient reported  BMI 19.94 kg/m²      Physical Exam    Sitting in her car, no acute distress.  Pleasant, cooperative, appropriate.  Excellent historian.  No increased work of breathing.      "

## 2025-05-22 NOTE — ASSESSMENT & PLAN NOTE
No cirrhosis. Diagnosed 2022 in setting of chronic alkaline phosphatase elevations and positive AMA.  9/4/24 normal alkaline phosphatase on ursodiol.  Will check liver enzymes q6m and continue current therapy  Orders:    Hepatic function panel; Standing    ursodiol (ACTIGALL) 300 mg capsule; Take 1 capsule (300 mg total) by mouth 2 (two) times a day

## 2025-05-27 RX ORDER — GALCANEZUMAB 120 MG/ML
1 INJECTION, SOLUTION SUBCUTANEOUS
Qty: 3 ML | Refills: 1 | Status: SHIPPED | OUTPATIENT
Start: 2025-05-27

## 2025-05-31 ENCOUNTER — APPOINTMENT (OUTPATIENT)
Dept: LAB | Facility: HOSPITAL | Age: 60
End: 2025-05-31
Payer: COMMERCIAL

## 2025-05-31 DIAGNOSIS — E55.9 VITAMIN D DEFICIENCY: ICD-10-CM

## 2025-05-31 DIAGNOSIS — C64.1 RENAL CELL CARCINOMA OF RIGHT KIDNEY (HCC): ICD-10-CM

## 2025-05-31 DIAGNOSIS — Z13.29 SCREENING FOR THYROID DISORDER: ICD-10-CM

## 2025-05-31 DIAGNOSIS — K74.3 PRIMARY BILIARY CHOLANGITIS (HCC): ICD-10-CM

## 2025-05-31 DIAGNOSIS — Z13.1 SCREENING FOR DIABETES MELLITUS: ICD-10-CM

## 2025-05-31 DIAGNOSIS — Z13.0 SCREENING FOR DEFICIENCY ANEMIA: ICD-10-CM

## 2025-05-31 DIAGNOSIS — Z13.220 SCREENING, LIPID: ICD-10-CM

## 2025-05-31 LAB
25(OH)D3 SERPL-MCNC: 73.9 NG/ML (ref 30–100)
ALBUMIN SERPL BCG-MCNC: 4.5 G/DL (ref 3.5–5)
ALP SERPL-CCNC: 48 U/L (ref 34–104)
ALT SERPL W P-5'-P-CCNC: 11 U/L (ref 7–52)
ANION GAP SERPL CALCULATED.3IONS-SCNC: 6 MMOL/L (ref 4–13)
AST SERPL W P-5'-P-CCNC: 14 U/L (ref 13–39)
BASOPHILS # BLD AUTO: 0.03 THOUSANDS/ÂΜL (ref 0–0.1)
BASOPHILS NFR BLD AUTO: 1 % (ref 0–1)
BILIRUB DIRECT SERPL-MCNC: 0.13 MG/DL (ref 0–0.2)
BILIRUB SERPL-MCNC: 0.46 MG/DL (ref 0.2–1)
BUN SERPL-MCNC: 11 MG/DL (ref 5–25)
CALCIUM SERPL-MCNC: 9.6 MG/DL (ref 8.4–10.2)
CHLORIDE SERPL-SCNC: 107 MMOL/L (ref 96–108)
CHOLEST SERPL-MCNC: 187 MG/DL (ref ?–200)
CO2 SERPL-SCNC: 28 MMOL/L (ref 21–32)
CREAT SERPL-MCNC: 0.89 MG/DL (ref 0.6–1.3)
EOSINOPHIL # BLD AUTO: 0.08 THOUSAND/ÂΜL (ref 0–0.61)
EOSINOPHIL NFR BLD AUTO: 2 % (ref 0–6)
ERYTHROCYTE [DISTWIDTH] IN BLOOD BY AUTOMATED COUNT: 12.3 % (ref 11.6–15.1)
GFR SERPL CREATININE-BSD FRML MDRD: 71 ML/MIN/1.73SQ M
GLUCOSE P FAST SERPL-MCNC: 94 MG/DL (ref 65–99)
HCT VFR BLD AUTO: 39.9 % (ref 34.8–46.1)
HDLC SERPL-MCNC: 85 MG/DL
HGB BLD-MCNC: 13.1 G/DL (ref 11.5–15.4)
IMM GRANULOCYTES # BLD AUTO: 0.01 THOUSAND/UL (ref 0–0.2)
IMM GRANULOCYTES NFR BLD AUTO: 0 % (ref 0–2)
LDLC SERPL CALC-MCNC: 95 MG/DL (ref 0–100)
LYMPHOCYTES # BLD AUTO: 1.99 THOUSANDS/ÂΜL (ref 0.6–4.47)
LYMPHOCYTES NFR BLD AUTO: 45 % (ref 14–44)
MCH RBC QN AUTO: 31.3 PG (ref 26.8–34.3)
MCHC RBC AUTO-ENTMCNC: 32.8 G/DL (ref 31.4–37.4)
MCV RBC AUTO: 95 FL (ref 82–98)
MONOCYTES # BLD AUTO: 0.3 THOUSAND/ÂΜL (ref 0.17–1.22)
MONOCYTES NFR BLD AUTO: 7 % (ref 4–12)
NEUTROPHILS # BLD AUTO: 1.97 THOUSANDS/ÂΜL (ref 1.85–7.62)
NEUTS SEG NFR BLD AUTO: 45 % (ref 43–75)
NONHDLC SERPL-MCNC: 102 MG/DL
NRBC BLD AUTO-RTO: 0 /100 WBCS
PLATELET # BLD AUTO: 238 THOUSANDS/UL (ref 149–390)
PMV BLD AUTO: 9.6 FL (ref 8.9–12.7)
POTASSIUM SERPL-SCNC: 4.1 MMOL/L (ref 3.5–5.3)
PROT SERPL-MCNC: 7 G/DL (ref 6.4–8.4)
RBC # BLD AUTO: 4.19 MILLION/UL (ref 3.81–5.12)
SODIUM SERPL-SCNC: 141 MMOL/L (ref 135–147)
TRIGL SERPL-MCNC: 36 MG/DL (ref ?–150)
TSH SERPL DL<=0.05 MIU/L-ACNC: 2.09 UIU/ML (ref 0.45–4.5)
WBC # BLD AUTO: 4.38 THOUSAND/UL (ref 4.31–10.16)

## 2025-05-31 PROCEDURE — 84443 ASSAY THYROID STIM HORMONE: CPT

## 2025-05-31 PROCEDURE — 36415 COLL VENOUS BLD VENIPUNCTURE: CPT

## 2025-05-31 PROCEDURE — 80053 COMPREHEN METABOLIC PANEL: CPT

## 2025-05-31 PROCEDURE — 82248 BILIRUBIN DIRECT: CPT

## 2025-05-31 PROCEDURE — 80061 LIPID PANEL: CPT

## 2025-05-31 PROCEDURE — 82306 VITAMIN D 25 HYDROXY: CPT

## 2025-05-31 PROCEDURE — 85025 COMPLETE CBC W/AUTO DIFF WBC: CPT

## 2025-06-02 ENCOUNTER — RESULTS FOLLOW-UP (OUTPATIENT)
Dept: INTERNAL MEDICINE CLINIC | Facility: CLINIC | Age: 60
End: 2025-06-02

## 2025-06-02 ENCOUNTER — RESULTS FOLLOW-UP (OUTPATIENT)
Dept: GASTROENTEROLOGY | Facility: MEDICAL CENTER | Age: 60
End: 2025-06-02

## 2025-06-06 ENCOUNTER — CLINICAL SUPPORT (OUTPATIENT)
Dept: INTERNAL MEDICINE CLINIC | Facility: CLINIC | Age: 60
End: 2025-06-06
Payer: COMMERCIAL

## 2025-06-06 DIAGNOSIS — M81.0 AGE-RELATED OSTEOPOROSIS WITHOUT CURRENT PATHOLOGICAL FRACTURE: Primary | ICD-10-CM

## 2025-06-06 PROCEDURE — 96372 THER/PROPH/DIAG INJ SC/IM: CPT

## 2025-06-19 DIAGNOSIS — F41.0 PANIC ATTACKS: ICD-10-CM

## 2025-06-19 RX ORDER — LORAZEPAM 0.5 MG/1
0.5 TABLET ORAL 2 TIMES DAILY
Qty: 60 TABLET | Refills: 0 | Status: SHIPPED | OUTPATIENT
Start: 2025-06-19

## 2025-06-24 DIAGNOSIS — F32.A DEPRESSION, UNSPECIFIED DEPRESSION TYPE: Primary | ICD-10-CM

## 2025-06-24 RX ORDER — DESVENLAFAXINE 50 MG/1
50 TABLET, FILM COATED, EXTENDED RELEASE ORAL DAILY
Qty: 30 TABLET | Refills: 5 | Status: SHIPPED | OUTPATIENT
Start: 2025-06-24 | End: 2025-12-21

## 2025-06-30 DIAGNOSIS — G43.719 CHRONIC MIGRAINE WITHOUT AURA, INTRACTABLE, WITHOUT STATUS MIGRAINOSUS: ICD-10-CM

## 2025-07-02 RX ORDER — GALCANEZUMAB 120 MG/ML
1 INJECTION, SOLUTION SUBCUTANEOUS
Qty: 3 ML | Refills: 0 | Status: SHIPPED | OUTPATIENT
Start: 2025-07-02

## 2025-07-14 ENCOUNTER — TELEPHONE (OUTPATIENT)
Age: 60
End: 2025-07-14

## 2025-07-14 NOTE — TELEPHONE ENCOUNTER
Patient only wants to see Enrique this week  for a med check   feeling off  anxiety & depression something is not right .   No opening

## 2025-07-15 ENCOUNTER — OFFICE VISIT (OUTPATIENT)
Dept: BEHAVIORAL/MENTAL HEALTH CLINIC | Facility: CLINIC | Age: 60
End: 2025-07-15
Payer: COMMERCIAL

## 2025-07-15 DIAGNOSIS — F41.0 PANIC ATTACKS: Primary | ICD-10-CM

## 2025-07-15 PROCEDURE — H2021 COM WRAP-AROUND SV, 15 MIN: HCPCS

## 2025-07-16 ENCOUNTER — TELEPHONE (OUTPATIENT)
Dept: BEHAVIORAL/MENTAL HEALTH CLINIC | Facility: CLINIC | Age: 60
End: 2025-07-16

## 2025-07-16 ENCOUNTER — OFFICE VISIT (OUTPATIENT)
Dept: INTERNAL MEDICINE CLINIC | Facility: CLINIC | Age: 60
End: 2025-07-16
Payer: COMMERCIAL

## 2025-07-16 VITALS
DIASTOLIC BLOOD PRESSURE: 72 MMHG | OXYGEN SATURATION: 98 % | TEMPERATURE: 98.4 F | WEIGHT: 136 LBS | HEART RATE: 84 BPM | HEIGHT: 69 IN | BODY MASS INDEX: 20.14 KG/M2 | SYSTOLIC BLOOD PRESSURE: 106 MMHG

## 2025-07-16 DIAGNOSIS — F41.0 PANIC ATTACKS: ICD-10-CM

## 2025-07-16 DIAGNOSIS — F32.A DEPRESSION, UNSPECIFIED DEPRESSION TYPE: ICD-10-CM

## 2025-07-16 DIAGNOSIS — F32.A DEPRESSION, UNSPECIFIED DEPRESSION TYPE: Primary | ICD-10-CM

## 2025-07-16 PROCEDURE — 99214 OFFICE O/P EST MOD 30 MIN: CPT | Performed by: PHYSICIAN ASSISTANT

## 2025-07-16 RX ORDER — DESVENLAFAXINE 25 MG/1
25 TABLET, EXTENDED RELEASE ORAL DAILY
Qty: 30 TABLET | Refills: 2 | Status: SHIPPED | OUTPATIENT
Start: 2025-07-16 | End: 2025-07-17 | Stop reason: SDUPTHER

## 2025-07-16 RX ORDER — BUPROPION HYDROCHLORIDE 150 MG/1
150 TABLET ORAL EVERY MORNING
Qty: 30 TABLET | Refills: 5 | Status: SHIPPED | OUTPATIENT
Start: 2025-07-16 | End: 2025-07-17 | Stop reason: SDUPTHER

## 2025-07-16 RX ORDER — LORAZEPAM 0.5 MG/1
0.5 TABLET ORAL EVERY 8 HOURS PRN
Qty: 90 TABLET | Refills: 0 | Status: SHIPPED | OUTPATIENT
Start: 2025-07-16

## 2025-07-16 NOTE — ASSESSMENT & PLAN NOTE
Orders:  •  Desvenlafaxine Succinate ER (Pristiq) 25 MG TB24; Take 1 tablet (25 mg total) by mouth in the morning Along with one 50mg tablet  •  buPROPion (WELLBUTRIN XL) 150 mg 24 hr tablet; Take 1 tablet (150 mg total) by mouth every morning

## 2025-07-16 NOTE — TELEPHONE ENCOUNTER
"Telephone call to patient as follow up from Walk-In Center visit. Spoke with patient who states she is doing \"good.\" Patient is scheduled to begin the [facility] on 7/18/2025.    Discussion held with patient to call [facility] crisis, return to walk-in center, call 911 or go to the nearest emergency room immediately if her situation changes/worsens.   "

## 2025-07-16 NOTE — TELEPHONE ENCOUNTER
Phone call from patient stating she went to  the Pristiq and Wellbutrin rx, but the were not written for brand necessary as patient has a savings card for both of these medications. Patent asking if these can be corrected and resent to the pharmacy. Patient asking for call to know they were sent please. Thank you.

## 2025-07-16 NOTE — ASSESSMENT & PLAN NOTE
Will decrease wellbutrin from 450mg to 150mg as this is likely inducing a lot of her panic. Continue pristiq but increase to 75mg daily. Continue ativan but recommend taking as straight order right now until symptoms are better controlled.   Orders:  •  Desvenlafaxine Succinate ER (Pristiq) 25 MG TB24; Take 1 tablet (25 mg total) by mouth in the morning Along with one 50mg tablet  •  LORazepam (Ativan) 0.5 mg tablet; Take 1 tablet (0.5 mg total) by mouth every 8 (eight) hours as needed for anxiety  •  buPROPion (WELLBUTRIN XL) 150 mg 24 hr tablet; Take 1 tablet (150 mg total) by mouth every morning

## 2025-07-16 NOTE — PROGRESS NOTES
Name: Tiffani Lancaster      : 1965      MRN: [id number]  Encounter Provider: Debbie Flood PA-C  Encounter Date: 2025   Encounter department: [facility]  :  Assessment & Plan  Panic attacks  Will decrease wellbutrin from 450mg to 150mg as this is likely inducing a lot of her panic. Continue pristiq but increase to 75mg daily. Continue ativan but recommend taking as straight order right now until symptoms are better controlled.   Orders:  •  Desvenlafaxine Succinate ER (Pristiq) 25 MG TB24; Take 1 tablet (25 mg total) by mouth in the morning Along with one 50mg tablet  •  LORazepam (Ativan) 0.5 mg tablet; Take 1 tablet (0.5 mg total) by mouth every 8 (eight) hours as needed for anxiety  •  buPROPion (WELLBUTRIN XL) 150 mg 24 hr tablet; Take 1 tablet (150 mg total) by mouth every morning    Depression, unspecified depression type      Orders:  •  Desvenlafaxine Succinate ER (Pristiq) 25 MG TB24; Take 1 tablet (25 mg total) by mouth in the morning Along with one 50mg tablet  •  buPROPion (WELLBUTRIN XL) 150 mg 24 hr tablet; Take 1 tablet (150 mg total) by mouth every morning          Depression Screening and Follow-up Plan: Patient was screened for depression during today's encounter. They screened negative with a PHQ-9 score of 4.        History of Present Illness   Pt presents for evaluation of increased anxiety and panic. She feels overwhelmed easily as well as tearful easily. She feels a sense of doom with a lot of irrational fears. She does not like being left alone. This all began after she stopped pristiq cold turkey and her wellbutrin was increased to 450mg. She stopped pristiq at 100mg due to headaches. She did end up restarting it at 50mg per the advice of her daughter a few weeks ago. She has been using ativan sparingly and it does not seem to be as helpful right now as it was before.     Anxiety  Symptoms include nervous/anxious behavior. Patient reports no chest pain,  "dizziness, nausea, palpitations or shortness of breath.         Review of Systems   Constitutional:  Negative for chills and fever.   HENT:  Negative for congestion, ear pain, hearing loss, postnasal drip, rhinorrhea, sinus pressure, sinus pain, sore throat and trouble swallowing.    Eyes:  Negative for pain and visual disturbance.   Respiratory:  Negative for cough, chest tightness, shortness of breath and wheezing.    Cardiovascular: Negative.  Negative for chest pain, palpitations and leg swelling.   Gastrointestinal:  Negative for abdominal pain, blood in stool, constipation, diarrhea, nausea and vomiting.   Endocrine: Negative for cold intolerance, heat intolerance, polydipsia, polyphagia and polyuria.   Genitourinary:  Negative for difficulty urinating, dysuria, flank pain and urgency.   Musculoskeletal:  Negative for arthralgias, back pain, gait problem and myalgias.   Skin:  Negative for rash.   Allergic/Immunologic: Negative.    Neurological:  Negative for dizziness, weakness, light-headedness and headaches.   Hematological: Negative.    Psychiatric/Behavioral:  Positive for dysphoric mood. Negative for behavioral problems and sleep disturbance. The patient is nervous/anxious.        Objective   /72 (Patient Position: Sitting, Cuff Size: Large)   Pulse 84   Temp 98.4 °F (36.9 °C) (Tympanic)   Ht 5' 9\" (1.753 m)   Wt 61.7 kg (136 lb)   SpO2 98%   BMI 20.08 kg/m²      Physical Exam  Constitutional:       Appearance: She is well-developed.   HENT:      Head: Normocephalic and atraumatic.      Nose: Nose normal.     Eyes:      Conjunctiva/sclera: Conjunctivae normal.     Pulmonary:      Effort: Pulmonary effort is normal.     Musculoskeletal:         General: Normal range of motion.      Cervical back: Normal range of motion.     Neurological:      Mental Status: She is alert and oriented to person, place, and time.     Psychiatric:         Behavior: Behavior normal.         Thought Content: Thought " content normal.         Judgment: Judgment normal.

## 2025-07-17 ENCOUNTER — NURSE TRIAGE (OUTPATIENT)
Age: 60
End: 2025-07-17

## 2025-07-17 ENCOUNTER — TELEPHONE (OUTPATIENT)
Dept: PSYCHOLOGY | Facility: CLINIC | Age: 60
End: 2025-07-17

## 2025-07-17 DIAGNOSIS — F41.0 PANIC ATTACKS: ICD-10-CM

## 2025-07-17 DIAGNOSIS — F32.A DEPRESSION, UNSPECIFIED DEPRESSION TYPE: ICD-10-CM

## 2025-07-17 RX ORDER — DESVENLAFAXINE SUCCINATE 25 MG/1
25 TABLET, EXTENDED RELEASE ORAL DAILY
Qty: 30 TABLET | Refills: 2 | Status: SHIPPED | OUTPATIENT
Start: 2025-07-17 | End: 2025-07-17 | Stop reason: SDUPTHER

## 2025-07-17 RX ORDER — BUPROPION HYDROCHLORIDE 150 MG/1
150 TABLET ORAL EVERY MORNING
Qty: 30 TABLET | Refills: 5 | Status: SHIPPED | OUTPATIENT
Start: 2025-07-17 | End: 2025-07-17 | Stop reason: SDUPTHER

## 2025-07-17 NOTE — TELEPHONE ENCOUNTER
Spoke with patient 7/17/25 @3:20pm during incoming call. Patient has changed her mind and would like to be added back on the schedule for PHP. Patient will now be starting tomorrow Friday 7/18/25. I provided patient with instructions for 8:00am arrival, to bring insurance card, photo ID, and lunch for tomorrow. Patient was provided with our address and phone number and had no further questions at the time of my call.

## 2025-07-17 NOTE — TELEPHONE ENCOUNTER
Regarding: RX error needing correction  ----- Message from Oanh OLIVAS sent at 7/17/2025  4:21 PM EDT -----  Patient has been trying to get rx for brand name only sent to [facility]. She has a discount card she uses there for them. They have both been sent errantly to [facility]. Can you please change to the correct phamacy and verify it is for brand name. Notes in chart where provider said that was ok as discount card only works for brand name not generic. Also, this is a new dosage for her on Wellbutrin, she has no pills at this dosage.

## 2025-07-17 NOTE — TELEPHONE ENCOUNTER
Patient called, note in chart indicates provider sent new script for name brand wellbutrin but [facility] to [facility]. She needs it now as it was a dosage change and she doesn't have any of the new dose. Please send brand name only script to [facility] and call patient once done so she knows she can call pharmacy to verify and fill and .    Patient also needs new script for Prestiq sent to [facility].

## 2025-07-17 NOTE — TELEPHONE ENCOUNTER
"Spoke with patient 7/17/25 in regards to the patient being scheduled to start our program tomorrow, Friday 7/18/25. Patient stated when she was in walk in center she was referred to us, but was not sure if she wanted to attend due to her not enjoying group settings. Patient stated to me that she wished to be established with a Psychiatrist in the future-to which I informed patient if she does not attend program we could not place a referral for her to see a Psychiatrist due to not being our patient. Patient confirmed, and stated she does not wish to attend PHP. I informed patient she would be able to contact her PCP for a referral should she wish, and offered to provide the patient with that office's phone number, patient declined and says she has it. I provided the patient our phone number for [facility] ext, as well as our intake coordinators extension if she ever wishes to schedule with us or know more about the program. Patient stated to me that she feels she is doing well \"much better than before\" since starting a new medication. I am routing this call via Epic to referring provider as well as PCP for continuation of care purposes. Appointments with PHP will be cancelled.   "

## 2025-07-17 NOTE — TELEPHONE ENCOUNTER
"REASON FOR CONVERSATION: Medication Problem      PROTOCOL DISPOSITION: Home Care        PRACTICE FOLLOW-UP: Patient is requesting pharmacy change and this writer is unable to send medication electronically to new pharmacy.      Reason for Disposition   Prescription prescribed recently is not at pharmacy and triager has access to patient's EMR and prescription is recorded in the EMR    Answer Assessment - Initial Assessment Questions  1. NAME of MEDICINE: \"What medicine(s) are you calling about?\"      Pritiq and Wellbutrin    2. QUESTION: \"What is your question?\" (e.g., double dose of medicine, side effect)      Sent to local pharmacy [facility]    3. PRESCRIBER: \"Who prescribed the medicine?\" Reason: if prescribed by specialist, call should be referred to that group.      Debbie Flood PA-C    Protocols used: Medication Question Call-Adult-[place]    "

## 2025-07-17 NOTE — TELEPHONE ENCOUNTER
Patient called to see if Wellbutrin and Pristiq was sent over as brand. I made patient aware that Debbie gave the okay for the medication to be brand.  Patient said she has been without the medication for two days how long will it take for then medication to be sent to the pharmacy.  I spoke to Yuliya and let Tiffani know that they are working on the order to get it to the pharmacy.

## 2025-07-17 NOTE — TELEPHONE ENCOUNTER
Patient would like a call back to advise if fax was sent to pharmacy to change script to brand. She said she wants to start script today if possible   Thank you     Magnolia Larkin Signed Yesterday         Phone call from patient stating she went to  the Pristiq and Wellbutrin rx, but the were not written for brand necessary as patient has a savings card for both of these medications. Patent asking if these can be corrected and resent to the pharmacy. Patient asking for call to know they were sent please. Thank you.         Refill sent to pharmacy of choice.  Could  on 24th - he is due to  on 26th.    Suzanne Alanis MD, Internal Medicine  12/22/2021 12:46 PM

## 2025-07-18 RX ORDER — DESVENLAFAXINE SUCCINATE 25 MG/1
25 TABLET, EXTENDED RELEASE ORAL DAILY
Qty: 30 TABLET | Refills: 2 | Status: SHIPPED | OUTPATIENT
Start: 2025-07-18 | End: 2025-07-24 | Stop reason: SDUPTHER

## 2025-07-18 RX ORDER — BUPROPION HYDROCHLORIDE 150 MG/1
150 TABLET ORAL EVERY MORNING
Qty: 30 TABLET | Refills: 5 | Status: SHIPPED | OUTPATIENT
Start: 2025-07-18 | End: 2026-01-14

## 2025-07-18 NOTE — TELEPHONE ENCOUNTER
"Patient LVMs yesterday at 5:30pm & 5:45pm (after office hours) 7/17/25, these messages were left after putting her back on the schedule in a previous call same day, 7/17/25 at 3:40pm. In these Vms patient stated that she is cancelling her visits with PHP-is no longer interested. I just received a separate phone call with the patient 7/18/25 at 11:20am stating she would like to reschedule again and be put on the schedule for next week. Patient is looking for more information regarding group and if she can attend virtually. Patient has concerns that \"if people are worse off than her (mentally) that it would trigger her...\". I made patient aware this is a group setting where people can talk about their life/feelings, but it is instructor lead and she can discuss this concern with the  to see if PHP would be a good fit. I went over how [employer] Teams would work if she decided to schedule with the patient during my phone call- will be contacting patient to schedule and discuss.   "

## 2025-07-18 NOTE — TELEPHONE ENCOUNTER
Patient is calling about the medications which were supposed to now go to [facility].  The pharmacy is saying that they do not currently have the Brand Pristiq in stock.  She is asking if a short supply can be sent to [facility], as they do have it in stock.  Please contact patient and advise.  Thank you.

## 2025-07-24 ENCOUNTER — TELEPHONE (OUTPATIENT)
Dept: INTERNAL MEDICINE CLINIC | Facility: CLINIC | Age: 60
End: 2025-07-24

## 2025-07-24 DIAGNOSIS — F32.A DEPRESSION, UNSPECIFIED DEPRESSION TYPE: ICD-10-CM

## 2025-07-24 DIAGNOSIS — F41.0 PANIC ATTACKS: ICD-10-CM

## 2025-07-24 RX ORDER — DESVENLAFAXINE SUCCINATE 25 MG/1
25 TABLET, EXTENDED RELEASE ORAL DAILY
Qty: 30 TABLET | Refills: 2 | Status: SHIPPED | OUTPATIENT
Start: 2025-07-24

## 2025-07-24 NOTE — TELEPHONE ENCOUNTER
Patient states [facility] only filled the Pristiq 50mg. They are telling her they do not have the script for the 25mg. Patient states she has been cutting pills in half, but as it if very difficult they are never really half. Patient requests clinical staff reach out to pharmacy to determine issue and a call back to herself to advise.

## 2025-07-24 NOTE — TELEPHONE ENCOUNTER
Phone call back from patient calling because she spoke to pharmacy and they have only ever received the script for the 50mg Pristiq and state they do not have a script that was sent to them on 7/18/2025 for the Pristiq 25mg and are asking if this can be resent.

## 2025-07-24 NOTE — TELEPHONE ENCOUNTER
Spoke to pt and let her know the 25 mg of the pristique was sent into the [facility] pharmacy on 7/18/25

## 2025-07-31 DIAGNOSIS — G43.719 CHRONIC MIGRAINE WITHOUT AURA, INTRACTABLE, WITHOUT STATUS MIGRAINOSUS: ICD-10-CM

## 2025-07-31 RX ORDER — GALCANEZUMAB 120 MG/ML
1 INJECTION, SOLUTION SUBCUTANEOUS
Qty: 3 ML | Refills: 5 | Status: SHIPPED | OUTPATIENT
Start: 2025-07-31

## 2025-08-12 ENCOUNTER — TELEPHONE (OUTPATIENT)
Dept: OTHER | Facility: HOSPITAL | Age: 60
End: 2025-08-12

## 2025-08-13 ENCOUNTER — OFFICE VISIT (OUTPATIENT)
Dept: INTERNAL MEDICINE CLINIC | Facility: CLINIC | Age: 60
End: 2025-08-13
Payer: COMMERCIAL

## 2025-08-13 ENCOUNTER — TELEPHONE (OUTPATIENT)
Dept: INTERNAL MEDICINE CLINIC | Facility: CLINIC | Age: 60
End: 2025-08-13

## 2025-08-13 PROBLEM — F32.2 SEVERE MAJOR DEPRESSIVE DISORDER (HCC): Status: ACTIVE | Noted: 2025-08-13
